# Patient Record
Sex: MALE | Race: ASIAN | NOT HISPANIC OR LATINO
[De-identification: names, ages, dates, MRNs, and addresses within clinical notes are randomized per-mention and may not be internally consistent; named-entity substitution may affect disease eponyms.]

---

## 2017-09-19 ENCOUNTER — APPOINTMENT (OUTPATIENT)
Dept: VASCULAR SURGERY | Facility: CLINIC | Age: 57
End: 2017-09-19
Payer: MEDICAID

## 2017-09-19 VITALS
DIASTOLIC BLOOD PRESSURE: 79 MMHG | SYSTOLIC BLOOD PRESSURE: 129 MMHG | TEMPERATURE: 98.6 F | BODY MASS INDEX: 29.03 KG/M2 | HEART RATE: 70 BPM | HEIGHT: 67 IN | WEIGHT: 185 LBS | RESPIRATION RATE: 16 BRPM

## 2017-09-19 DIAGNOSIS — I65.29 OCCLUSION AND STENOSIS OF UNSPECIFIED CAROTID ARTERY: ICD-10-CM

## 2017-09-19 PROCEDURE — 93880 EXTRACRANIAL BILAT STUDY: CPT

## 2017-09-19 PROCEDURE — 99204 OFFICE O/P NEW MOD 45 MIN: CPT | Mod: 25

## 2018-03-13 ENCOUNTER — APPOINTMENT (OUTPATIENT)
Dept: VASCULAR SURGERY | Facility: CLINIC | Age: 58
End: 2018-03-13

## 2018-09-02 ENCOUNTER — EMERGENCY (EMERGENCY)
Facility: HOSPITAL | Age: 58
LOS: 1 days | Discharge: HOME | End: 2018-09-02
Attending: EMERGENCY MEDICINE | Admitting: EMERGENCY MEDICINE
Payer: MEDICAID

## 2018-09-02 VITALS
OXYGEN SATURATION: 98 % | RESPIRATION RATE: 16 BRPM | SYSTOLIC BLOOD PRESSURE: 143 MMHG | HEART RATE: 77 BPM | TEMPERATURE: 98 F | DIASTOLIC BLOOD PRESSURE: 73 MMHG

## 2018-09-02 VITALS
OXYGEN SATURATION: 98 % | TEMPERATURE: 98 F | DIASTOLIC BLOOD PRESSURE: 62 MMHG | SYSTOLIC BLOOD PRESSURE: 135 MMHG | HEART RATE: 80 BPM | RESPIRATION RATE: 16 BRPM

## 2018-09-02 PROCEDURE — 99282 EMERGENCY DEPT VISIT SF MDM: CPT | Mod: 25

## 2018-09-02 RX ORDER — ACETAMINOPHEN 500 MG
975 TABLET ORAL ONCE
Qty: 0 | Refills: 0 | Status: COMPLETED | OUTPATIENT
Start: 2018-09-02 | End: 2018-09-02

## 2018-09-02 RX ADMIN — Medication 975 MILLIGRAM(S): at 07:21

## 2018-09-02 NOTE — ED PROVIDER NOTE - GASTROINTESTINAL, MLM
Abdomen soft, non-tender, no guarding.
TAVR, transfemoral approach  03/29/2017  Transfemoral TAVR via Right Femoral Cutdown (23mm Martha S3) (NCT# [id number]) (STS/ACC TVT Registry ID# [id number])  Active  [id number]

## 2018-09-02 NOTE — ED PROVIDER NOTE - ATTENDING CONTRIBUTION TO CARE
MD Thomas:  I performed a face to face bedside interview with patient regarding history of present illness, review of symptoms and past medical history. I completed an independent physical exam(documented below).  I have discussed patient's plan of care with resident.   I agree with note as stated above, having amended the EMR as needed to reflect my findings. I have discussed the assessment and plan of care.  This includes during the time I functioned as the attending physician for this patient.  PE:  Gen: Alert, NAD  Head: NC, AT,  EOMI, normal lids/conjunctiva  ENT:  normal hearing, patent oropharynx without erythema/exudate  Neck: +supple, no tenderness/meningismus/JVD, +Trachea midline  Chest: no chest wall tenderness, equal chest rise  Pulm: Bilateral BS, normal resp effort, no wheeze/stridor/retractions  CV: RRR, no M/R/G, +dist pulses  Abd: +BS, soft, NT/ND  Rectal: deferred  Mskel: no edema/erythema/cyanosis; skin tear/avulsion on sole of R foot approx 3cm in diameter; +ttp, not actively bleeding  Skin: no rash  Neuro: AAOx3, no sensory/motor deficits  MDM:  59yo M w/ pmh inclusive of htn, hld, IDDM, etoh abuse c/o R foot injury. States he struck bottom of his R foot against piece of furniture while falling out of bed; denies head trauma or pain, not on AC. Wound to be cleaned and bandaged by resident, outpt wound clinic f/u as necessary.

## 2018-09-02 NOTE — ED ADULT TRIAGE NOTE - CHIEF COMPLAINT QUOTE
Pt walk in c/o pain, skin tears on Right sole s/p Fall from bed. denies Hitting his head on the floor/ LOC

## 2018-09-02 NOTE — ED ADULT NURSE NOTE - OBJECTIVE STATEMENT
Patient is awake, A&O x 3, appears uncomfortable but in no apparent distress.  Patient rolled off bed and hit right foot on something, wound with mild bleeding noted to plantar surface.  Denies dizziness, LOC or trauma to head.  History of CAD, DM and HTN.  Vitals taken, MD at bedside and will continue to monitor patient.

## 2018-09-02 NOTE — ED PROVIDER NOTE - OBJECTIVE STATEMENT
58M h/o alcohol use, IDDM, HLD, HTN presents with skin tear on bottom of right foot after falling out of his bed while sleeping and hitting the edge of a table. He had no LOC, did not hit head. Had minor bleeding and some pain in the area. No other concerns. Last drink before bed.

## 2018-09-02 NOTE — ED PROVIDER NOTE - PLAN OF CARE
1. TAKE ALL MEDICATIONS AS DIRECTED.    2. FOR PAIN OR FEVER YOU CAN TAKE IBUPROFEN (MOTRIN, ADVIL) OR ACETAMINOPHEN (TYLENOL) AS NEEDED, AS DIRECTED ON PACKAGING.  3. FOLLOW UP WITH YOUR PRIMARY DOCTOR WITHIN 5 DAYS AS DIRECTED.  4. IF YOU HAD LABS OR IMAGING DONE, YOU WERE GIVEN COPIES OF ALL LABS AND/OR IMAGING RESULTS FROM YOUR ER VISIT--PLEASE TAKE THEM WITH YOU TO YOUR FOLLOW UP APPOINTMENTS.  5. IF NEEDED, CALL PATIENT ACCESS SERVICES AT [phone] TO FIND A PRIMARY CARE PHYSICIAN.  OR CALL [phone] TO MAKE AN APPOINTMENT WITH THE CLINIC.  6. RETURN TO THE ER FOR ANY WORSENING SYMPTOMS OR CONCERNS.  7. Follow wound care instructions given at discharge.  8. Follow up with your Podiatrist and PMD.

## 2018-09-02 NOTE — ED ADULT NURSE NOTE - PMH
CAD (coronary artery disease)    DM (diabetes mellitus)    HLD (hyperlipidemia)    HTN - Hypertension

## 2018-09-02 NOTE — ED ADULT NURSE NOTE - NSIMPLEMENTINTERV_GEN_ALL_ED
Implemented All Universal Safety Interventions:  [place] to call system. Call bell, personal items and telephone within reach. Instruct patient to call for assistance. Room bathroom lighting operational. Non-slip footwear when patient is off stretcher. Physically safe environment: no spills, clutter or unnecessary equipment. Stretcher in lowest position, wheels locked, appropriate side rails in place.

## 2018-09-02 NOTE — ED PROVIDER NOTE - SKIN WOUND TYPE
skin tear approx. 2x3 cm with thin skin and clotted blood beneath it. No active bleeding, No futher lacerations under the skin. Skin removed and cleaned./avulsion(s)/ABRASION(S)

## 2018-09-29 ENCOUNTER — INPATIENT (INPATIENT)
Facility: HOSPITAL | Age: 58
LOS: 2 days | Discharge: HOME | End: 2018-10-02
Attending: INTERNAL MEDICINE | Admitting: INTERNAL MEDICINE
Payer: MEDICAID

## 2018-09-29 VITALS
OXYGEN SATURATION: 98 % | SYSTOLIC BLOOD PRESSURE: 238 MMHG | DIASTOLIC BLOOD PRESSURE: 116 MMHG | TEMPERATURE: 99 F | HEART RATE: 108 BPM | RESPIRATION RATE: 35 BRPM

## 2018-09-29 DIAGNOSIS — J81.0 ACUTE PULMONARY EDEMA: ICD-10-CM

## 2018-09-29 DIAGNOSIS — B34.1 ENTEROVIRUS INFECTION, UNSPECIFIED: ICD-10-CM

## 2018-09-29 DIAGNOSIS — E11.65 TYPE 2 DIABETES MELLITUS WITH HYPERGLYCEMIA: ICD-10-CM

## 2018-09-29 DIAGNOSIS — I50.23 ACUTE ON CHRONIC SYSTOLIC (CONGESTIVE) HEART FAILURE: ICD-10-CM

## 2018-09-29 DIAGNOSIS — I16.1 HYPERTENSIVE EMERGENCY: ICD-10-CM

## 2018-09-29 DIAGNOSIS — I25.10 ATHEROSCLEROTIC HEART DISEASE OF NATIVE CORONARY ARTERY WITHOUT ANGINA PECTORIS: ICD-10-CM

## 2018-09-29 DIAGNOSIS — E78.5 HYPERLIPIDEMIA, UNSPECIFIED: ICD-10-CM

## 2018-09-29 DIAGNOSIS — Z29.9 ENCOUNTER FOR PROPHYLACTIC MEASURES, UNSPECIFIED: ICD-10-CM

## 2018-09-29 LAB
ALBUMIN SERPL ELPH-MCNC: 4.2 G/DL — SIGNIFICANT CHANGE UP (ref 3.3–5)
ALP SERPL-CCNC: 90 U/L — SIGNIFICANT CHANGE UP (ref 40–120)
ALT FLD-CCNC: 48 U/L — HIGH (ref 4–41)
APTT BLD: 30.4 SEC — SIGNIFICANT CHANGE UP (ref 27.5–37.4)
AST SERPL-CCNC: 32 U/L — SIGNIFICANT CHANGE UP (ref 4–40)
B PERT DNA SPEC QL NAA+PROBE: SIGNIFICANT CHANGE UP
BASE EXCESS BLDV CALC-SCNC: -1 MMOL/L — SIGNIFICANT CHANGE UP
BASOPHILS # BLD AUTO: 0.04 K/UL — SIGNIFICANT CHANGE UP (ref 0–0.2)
BASOPHILS NFR BLD AUTO: 0.4 % — SIGNIFICANT CHANGE UP (ref 0–2)
BILIRUB SERPL-MCNC: 0.4 MG/DL — SIGNIFICANT CHANGE UP (ref 0.2–1.2)
BLOOD GAS VENOUS - CREATININE: 0.89 MG/DL — SIGNIFICANT CHANGE UP (ref 0.5–1.3)
BUN SERPL-MCNC: 19 MG/DL — SIGNIFICANT CHANGE UP (ref 7–23)
C PNEUM DNA SPEC QL NAA+PROBE: NOT DETECTED — SIGNIFICANT CHANGE UP
CALCIUM SERPL-MCNC: 9.3 MG/DL — SIGNIFICANT CHANGE UP (ref 8.4–10.5)
CHLORIDE BLDV-SCNC: 104 MMOL/L — SIGNIFICANT CHANGE UP (ref 96–108)
CHLORIDE SERPL-SCNC: 100 MMOL/L — SIGNIFICANT CHANGE UP (ref 98–107)
CO2 SERPL-SCNC: 22 MMOL/L — SIGNIFICANT CHANGE UP (ref 22–31)
CREAT SERPL-MCNC: 0.94 MG/DL — SIGNIFICANT CHANGE UP (ref 0.5–1.3)
EOSINOPHIL # BLD AUTO: 0.14 K/UL — SIGNIFICANT CHANGE UP (ref 0–0.5)
EOSINOPHIL NFR BLD AUTO: 1.4 % — SIGNIFICANT CHANGE UP (ref 0–6)
FLUAV H1 2009 PAND RNA SPEC QL NAA+PROBE: NOT DETECTED — SIGNIFICANT CHANGE UP
FLUAV H1 RNA SPEC QL NAA+PROBE: NOT DETECTED — SIGNIFICANT CHANGE UP
FLUAV H3 RNA SPEC QL NAA+PROBE: NOT DETECTED — SIGNIFICANT CHANGE UP
FLUAV SUBTYP SPEC NAA+PROBE: SIGNIFICANT CHANGE UP
FLUBV RNA SPEC QL NAA+PROBE: NOT DETECTED — SIGNIFICANT CHANGE UP
GAS PNL BLDV: 137 MMOL/L — SIGNIFICANT CHANGE UP (ref 136–146)
GLUCOSE BLDV-MCNC: 289 — HIGH (ref 70–99)
GLUCOSE SERPL-MCNC: 281 MG/DL — HIGH (ref 70–99)
HADV DNA SPEC QL NAA+PROBE: NOT DETECTED — SIGNIFICANT CHANGE UP
HCO3 BLDV-SCNC: 22 MMOL/L — SIGNIFICANT CHANGE UP (ref 20–27)
HCOV 229E RNA SPEC QL NAA+PROBE: NOT DETECTED — SIGNIFICANT CHANGE UP
HCOV HKU1 RNA SPEC QL NAA+PROBE: NOT DETECTED — SIGNIFICANT CHANGE UP
HCOV NL63 RNA SPEC QL NAA+PROBE: NOT DETECTED — SIGNIFICANT CHANGE UP
HCOV OC43 RNA SPEC QL NAA+PROBE: NOT DETECTED — SIGNIFICANT CHANGE UP
HCT VFR BLD CALC: 42.2 % — SIGNIFICANT CHANGE UP (ref 39–50)
HCT VFR BLDV CALC: 42 % — SIGNIFICANT CHANGE UP (ref 39–51)
HGB BLD-MCNC: 13.3 G/DL — SIGNIFICANT CHANGE UP (ref 13–17)
HGB BLDV-MCNC: 13.7 G/DL — SIGNIFICANT CHANGE UP (ref 13–17)
HMPV RNA SPEC QL NAA+PROBE: NOT DETECTED — SIGNIFICANT CHANGE UP
HPIV1 RNA SPEC QL NAA+PROBE: NOT DETECTED — SIGNIFICANT CHANGE UP
HPIV2 RNA SPEC QL NAA+PROBE: NOT DETECTED — SIGNIFICANT CHANGE UP
HPIV3 RNA SPEC QL NAA+PROBE: NOT DETECTED — SIGNIFICANT CHANGE UP
HPIV4 RNA SPEC QL NAA+PROBE: NOT DETECTED — SIGNIFICANT CHANGE UP
IMM GRANULOCYTES # BLD AUTO: 0.04 # — SIGNIFICANT CHANGE UP
IMM GRANULOCYTES NFR BLD AUTO: 0.4 % — SIGNIFICANT CHANGE UP (ref 0–1.5)
INR BLD: 0.97 — SIGNIFICANT CHANGE UP (ref 0.88–1.17)
LACTATE BLDV-MCNC: 1.9 MMOL/L — SIGNIFICANT CHANGE UP (ref 0.5–2)
LYMPHOCYTES # BLD AUTO: 2.86 K/UL — SIGNIFICANT CHANGE UP (ref 1–3.3)
LYMPHOCYTES # BLD AUTO: 28.8 % — SIGNIFICANT CHANGE UP (ref 13–44)
M PNEUMO DNA SPEC QL NAA+PROBE: NOT DETECTED — SIGNIFICANT CHANGE UP
MCHC RBC-ENTMCNC: 25.7 PG — LOW (ref 27–34)
MCHC RBC-ENTMCNC: 31.5 % — LOW (ref 32–36)
MCV RBC AUTO: 81.6 FL — SIGNIFICANT CHANGE UP (ref 80–100)
MONOCYTES # BLD AUTO: 1.17 K/UL — HIGH (ref 0–0.9)
MONOCYTES NFR BLD AUTO: 11.8 % — SIGNIFICANT CHANGE UP (ref 2–14)
NEUTROPHILS # BLD AUTO: 5.69 K/UL — SIGNIFICANT CHANGE UP (ref 1.8–7.4)
NEUTROPHILS NFR BLD AUTO: 57.2 % — SIGNIFICANT CHANGE UP (ref 43–77)
NRBC # FLD: 0 — SIGNIFICANT CHANGE UP
NT-PROBNP SERPL-SCNC: 897.3 PG/ML — SIGNIFICANT CHANGE UP
PCO2 BLDV: 50 MMHG — SIGNIFICANT CHANGE UP (ref 41–51)
PH BLDV: 7.31 PH — LOW (ref 7.32–7.43)
PLATELET # BLD AUTO: 229 K/UL — SIGNIFICANT CHANGE UP (ref 150–400)
PMV BLD: 12.5 FL — SIGNIFICANT CHANGE UP (ref 7–13)
PO2 BLDV: 39 MMHG — SIGNIFICANT CHANGE UP (ref 35–40)
POTASSIUM BLDV-SCNC: 4.2 MMOL/L — SIGNIFICANT CHANGE UP (ref 3.4–4.5)
POTASSIUM SERPL-MCNC: 4.2 MMOL/L — SIGNIFICANT CHANGE UP (ref 3.5–5.3)
POTASSIUM SERPL-SCNC: 4.2 MMOL/L — SIGNIFICANT CHANGE UP (ref 3.5–5.3)
PROT SERPL-MCNC: 7.3 G/DL — SIGNIFICANT CHANGE UP (ref 6–8.3)
PROTHROM AB SERPL-ACNC: 11.1 SEC — SIGNIFICANT CHANGE UP (ref 9.8–13.1)
RBC # BLD: 5.17 M/UL — SIGNIFICANT CHANGE UP (ref 4.2–5.8)
RBC # FLD: 14.7 % — HIGH (ref 10.3–14.5)
RSV RNA SPEC QL NAA+PROBE: NOT DETECTED — SIGNIFICANT CHANGE UP
RV+EV RNA SPEC QL NAA+PROBE: NOT DETECTED — SIGNIFICANT CHANGE UP
SAO2 % BLDV: 62.3 % — SIGNIFICANT CHANGE UP (ref 60–85)
SODIUM SERPL-SCNC: 136 MMOL/L — SIGNIFICANT CHANGE UP (ref 135–145)
TROPONIN T, HIGH SENSITIVITY: 24 NG/L — SIGNIFICANT CHANGE UP (ref ?–14)
TROPONIN T, HIGH SENSITIVITY: 26 NG/L — SIGNIFICANT CHANGE UP (ref ?–14)
WBC # BLD: 9.94 K/UL — SIGNIFICANT CHANGE UP (ref 3.8–10.5)
WBC # FLD AUTO: 9.94 K/UL — SIGNIFICANT CHANGE UP (ref 3.8–10.5)

## 2018-09-29 PROCEDURE — 71045 X-RAY EXAM CHEST 1 VIEW: CPT | Mod: 26

## 2018-09-29 PROCEDURE — 99223 1ST HOSP IP/OBS HIGH 75: CPT

## 2018-09-29 RX ORDER — DEXTROSE 50 % IN WATER 50 %
25 SYRINGE (ML) INTRAVENOUS ONCE
Qty: 0 | Refills: 0 | Status: DISCONTINUED | OUTPATIENT
Start: 2018-09-29 | End: 2018-10-02

## 2018-09-29 RX ORDER — TIOTROPIUM BROMIDE 18 UG/1
1 CAPSULE ORAL; RESPIRATORY (INHALATION) DAILY
Qty: 0 | Refills: 0 | Status: DISCONTINUED | OUTPATIENT
Start: 2018-09-29 | End: 2018-10-01

## 2018-09-29 RX ORDER — SODIUM CHLORIDE 9 MG/ML
1000 INJECTION, SOLUTION INTRAVENOUS
Qty: 0 | Refills: 0 | Status: DISCONTINUED | OUTPATIENT
Start: 2018-09-29 | End: 2018-10-02

## 2018-09-29 RX ORDER — INSULIN LISPRO 100/ML
VIAL (ML) SUBCUTANEOUS
Qty: 0 | Refills: 0 | Status: DISCONTINUED | OUTPATIENT
Start: 2018-09-29 | End: 2018-09-30

## 2018-09-29 RX ORDER — GLUCAGON INJECTION, SOLUTION 0.5 MG/.1ML
1 INJECTION, SOLUTION SUBCUTANEOUS ONCE
Qty: 0 | Refills: 0 | Status: DISCONTINUED | OUTPATIENT
Start: 2018-09-29 | End: 2018-10-02

## 2018-09-29 RX ORDER — CLOPIDOGREL BISULFATE 75 MG/1
75 TABLET, FILM COATED ORAL DAILY
Qty: 0 | Refills: 0 | Status: DISCONTINUED | OUTPATIENT
Start: 2018-09-29 | End: 2018-10-02

## 2018-09-29 RX ORDER — INSULIN LISPRO 100/ML
10 VIAL (ML) SUBCUTANEOUS
Qty: 0 | Refills: 0 | Status: DISCONTINUED | OUTPATIENT
Start: 2018-09-29 | End: 2018-09-30

## 2018-09-29 RX ORDER — ENOXAPARIN SODIUM 100 MG/ML
40 INJECTION SUBCUTANEOUS EVERY 24 HOURS
Qty: 0 | Refills: 0 | Status: DISCONTINUED | OUTPATIENT
Start: 2018-09-29 | End: 2018-10-02

## 2018-09-29 RX ORDER — FUROSEMIDE 40 MG
40 TABLET ORAL DAILY
Qty: 0 | Refills: 0 | Status: DISCONTINUED | OUTPATIENT
Start: 2018-09-29 | End: 2018-09-30

## 2018-09-29 RX ORDER — DEXTROSE 50 % IN WATER 50 %
15 SYRINGE (ML) INTRAVENOUS ONCE
Qty: 0 | Refills: 0 | Status: DISCONTINUED | OUTPATIENT
Start: 2018-09-29 | End: 2018-10-02

## 2018-09-29 RX ORDER — ATORVASTATIN CALCIUM 80 MG/1
80 TABLET, FILM COATED ORAL AT BEDTIME
Qty: 0 | Refills: 0 | Status: DISCONTINUED | OUTPATIENT
Start: 2018-09-29 | End: 2018-10-02

## 2018-09-29 RX ORDER — INSULIN LISPRO 100/ML
VIAL (ML) SUBCUTANEOUS AT BEDTIME
Qty: 0 | Refills: 0 | Status: DISCONTINUED | OUTPATIENT
Start: 2018-09-29 | End: 2018-09-30

## 2018-09-29 RX ORDER — METOPROLOL TARTRATE 50 MG
25 TABLET ORAL EVERY 12 HOURS
Qty: 0 | Refills: 0 | Status: DISCONTINUED | OUTPATIENT
Start: 2018-09-29 | End: 2018-10-02

## 2018-09-29 RX ORDER — AZITHROMYCIN 500 MG/1
500 TABLET, FILM COATED ORAL ONCE
Qty: 0 | Refills: 0 | Status: COMPLETED | OUTPATIENT
Start: 2018-09-29 | End: 2018-09-29

## 2018-09-29 RX ORDER — INSULIN GLARGINE 100 [IU]/ML
20 INJECTION, SOLUTION SUBCUTANEOUS EVERY MORNING
Qty: 0 | Refills: 0 | Status: DISCONTINUED | OUTPATIENT
Start: 2018-09-29 | End: 2018-09-30

## 2018-09-29 RX ORDER — DEXTROSE 50 % IN WATER 50 %
12.5 SYRINGE (ML) INTRAVENOUS ONCE
Qty: 0 | Refills: 0 | Status: DISCONTINUED | OUTPATIENT
Start: 2018-09-29 | End: 2018-10-02

## 2018-09-29 RX ORDER — NITROGLYCERIN 6.5 MG
0.4 CAPSULE, EXTENDED RELEASE ORAL ONCE
Qty: 0 | Refills: 0 | Status: COMPLETED | OUTPATIENT
Start: 2018-09-29 | End: 2018-09-29

## 2018-09-29 RX ORDER — ALBUTEROL 90 UG/1
1 AEROSOL, METERED ORAL EVERY 4 HOURS
Qty: 0 | Refills: 0 | Status: DISCONTINUED | OUTPATIENT
Start: 2018-09-29 | End: 2018-10-01

## 2018-09-29 RX ORDER — PANTOPRAZOLE SODIUM 20 MG/1
40 TABLET, DELAYED RELEASE ORAL
Qty: 0 | Refills: 0 | Status: DISCONTINUED | OUTPATIENT
Start: 2018-09-29 | End: 2018-10-02

## 2018-09-29 RX ORDER — AZITHROMYCIN 500 MG/1
500 TABLET, FILM COATED ORAL ONCE
Qty: 0 | Refills: 0 | Status: DISCONTINUED | OUTPATIENT
Start: 2018-09-29 | End: 2018-09-29

## 2018-09-29 RX ORDER — ASPIRIN/CALCIUM CARB/MAGNESIUM 324 MG
81 TABLET ORAL DAILY
Qty: 0 | Refills: 0 | Status: DISCONTINUED | OUTPATIENT
Start: 2018-09-29 | End: 2018-10-02

## 2018-09-29 RX ORDER — IPRATROPIUM/ALBUTEROL SULFATE 18-103MCG
3 AEROSOL WITH ADAPTER (GRAM) INHALATION EVERY 6 HOURS
Qty: 0 | Refills: 0 | Status: DISCONTINUED | OUTPATIENT
Start: 2018-09-29 | End: 2018-10-01

## 2018-09-29 RX ORDER — CEFTRIAXONE 500 MG/1
1 INJECTION, POWDER, FOR SOLUTION INTRAMUSCULAR; INTRAVENOUS ONCE
Qty: 0 | Refills: 0 | Status: COMPLETED | OUTPATIENT
Start: 2018-09-29 | End: 2018-09-29

## 2018-09-29 RX ORDER — VANCOMYCIN HCL 1 G
1000 VIAL (EA) INTRAVENOUS ONCE
Qty: 0 | Refills: 0 | Status: COMPLETED | OUTPATIENT
Start: 2018-09-29 | End: 2018-09-29

## 2018-09-29 RX ORDER — ACETAMINOPHEN 500 MG
650 TABLET ORAL EVERY 6 HOURS
Qty: 0 | Refills: 0 | Status: DISCONTINUED | OUTPATIENT
Start: 2018-09-29 | End: 2018-10-02

## 2018-09-29 RX ORDER — FUROSEMIDE 40 MG
20 TABLET ORAL ONCE
Qty: 0 | Refills: 0 | Status: COMPLETED | OUTPATIENT
Start: 2018-09-29 | End: 2018-09-29

## 2018-09-29 RX ADMIN — Medication 0.4 MILLIGRAM(S): at 00:46

## 2018-09-29 RX ADMIN — Medication 2: at 17:50

## 2018-09-29 RX ADMIN — ATORVASTATIN CALCIUM 80 MILLIGRAM(S): 80 TABLET, FILM COATED ORAL at 22:02

## 2018-09-29 RX ADMIN — PANTOPRAZOLE SODIUM 40 MILLIGRAM(S): 20 TABLET, DELAYED RELEASE ORAL at 12:02

## 2018-09-29 RX ADMIN — ENOXAPARIN SODIUM 40 MILLIGRAM(S): 100 INJECTION SUBCUTANEOUS at 12:40

## 2018-09-29 RX ADMIN — AZITHROMYCIN 500 MILLIGRAM(S): 500 TABLET, FILM COATED ORAL at 03:09

## 2018-09-29 RX ADMIN — CLOPIDOGREL BISULFATE 75 MILLIGRAM(S): 75 TABLET, FILM COATED ORAL at 11:49

## 2018-09-29 RX ADMIN — CEFTRIAXONE 1 GRAM(S): 500 INJECTION, POWDER, FOR SOLUTION INTRAMUSCULAR; INTRAVENOUS at 01:32

## 2018-09-29 RX ADMIN — Medication 6: at 22:06

## 2018-09-29 RX ADMIN — CEFTRIAXONE 100 GRAM(S): 500 INJECTION, POWDER, FOR SOLUTION INTRAMUSCULAR; INTRAVENOUS at 01:04

## 2018-09-29 RX ADMIN — Medication 3 MILLILITER(S): at 22:14

## 2018-09-29 RX ADMIN — Medication 8: at 13:10

## 2018-09-29 RX ADMIN — INSULIN GLARGINE 20 UNIT(S): 100 INJECTION, SOLUTION SUBCUTANEOUS at 22:02

## 2018-09-29 RX ADMIN — Medication 40 MILLIGRAM(S): at 11:50

## 2018-09-29 RX ADMIN — Medication 20 MILLIGRAM(S): at 05:35

## 2018-09-29 RX ADMIN — Medication 25 MILLIGRAM(S): at 08:29

## 2018-09-29 RX ADMIN — Medication 81 MILLIGRAM(S): at 11:49

## 2018-09-29 RX ADMIN — Medication 1000 MILLIGRAM(S): at 04:10

## 2018-09-29 RX ADMIN — Medication 250 MILLIGRAM(S): at 03:10

## 2018-09-29 RX ADMIN — Medication 25 MILLIGRAM(S): at 17:50

## 2018-09-29 RX ADMIN — AZITHROMYCIN 250 MILLIGRAM(S): 500 TABLET, FILM COATED ORAL at 01:33

## 2018-09-29 RX ADMIN — Medication 3 MILLILITER(S): at 11:18

## 2018-09-29 RX ADMIN — Medication 10 UNIT(S): at 13:11

## 2018-09-29 NOTE — ED PROVIDER NOTE - PHYSICAL EXAMINATION
Gen: AAOx3, non-toxic  Head: NCAT  HEENT: EOMI, oral mucosa moist, normal conjunctiva  Lung: diffuse rales and rhonchi   CV: tachycardia, regular rhythm, no murmurs, rubs or gallops  Abd: soft, NTND, no guarding, no CVA tenderness  MSK: no visible deformities  Neuro: No focal sensory or motor deficits  Skin: Warm, well perfused, no rash  Psych: normal affect.     ~Lisandro Grewal PGY1

## 2018-09-29 NOTE — H&P ADULT - LYMPHATIC
posterior cervical L/posterior cervical R/supraclavicular R/anterior cervical R/anterior cervical L/supraclavicular L

## 2018-09-29 NOTE — CONSULT NOTE ADULT - ATTENDING COMMENTS
Patient seen and examined.  Agree with above.   -continue with iv lasix    -check tte    Renata Harvey MD

## 2018-09-29 NOTE — ED PROVIDER NOTE - NS ED ROS FT
GENERAL: No fever or chills  EYES: no change in vision  HEENT: no trouble swallowing or speaking  CARDIAC: no chest pain  PULMONARY: +SOB and cough  GI: no abdominal pain, no nausea, no vomiting, no diarrhea or constipation  : No changes in urination  SKIN: no rashes  NEURO: no headache or neurologic sxs  MSK: No joint pain     ~Lisandro Grewal PGY1

## 2018-09-29 NOTE — ED ADULT NURSE NOTE - NSIMPLEMENTINTERV_GEN_ALL_ED
Implemented All Universal Safety Interventions:  [place] to call system. Call bell, personal items and telephone within reach. Instruct patient to call for assistance. Room bathroom lighting operational. Non-slip footwear when patient is off stretcher. Physically safe environment: no spills, clutter or unnecessary equipment. Stretcher in lowest position, wheels locked, appropriate side rails in place.

## 2018-09-29 NOTE — PROGRESS NOTE ADULT - SUBJECTIVE AND OBJECTIVE BOX
CHIEF COMPLAINT: Patient is a 58y old  male who presents with a chief complaint of Respiratory distress (29 Sep 2018 14:11)      SUBJECTIVE / OVERNIGHT EVENTS:    Breathing improved. Coughing slightly. Denies LE edema.    MEDICATIONS  (STANDING):  ALBUTerol    90 MICROgram(s) HFA Inhaler 1 Puff(s) Inhalation every 4 hours  ALBUTerol/ipratropium for Nebulization 3 milliLiter(s) Nebulizer every 6 hours  aspirin enteric coated 81 milliGRAM(s) Oral daily  atorvastatin 80 milliGRAM(s) Oral at bedtime  clopidogrel Tablet 75 milliGRAM(s) Oral daily  dextrose 5%. 1000 milliLiter(s) (50 mL/Hr) IV Continuous <Continuous>  dextrose 50% Injectable 12.5 Gram(s) IV Push once  dextrose 50% Injectable 25 Gram(s) IV Push once  dextrose 50% Injectable 25 Gram(s) IV Push once  enoxaparin Injectable 40 milliGRAM(s) SubCutaneous every 24 hours  furosemide   Injectable 40 milliGRAM(s) IV Push daily  insulin glargine Injectable (LANTUS) 20 Unit(s) SubCutaneous every morning  insulin lispro (HumaLOG) corrective regimen sliding scale   SubCutaneous three times a day before meals  insulin lispro (HumaLOG) corrective regimen sliding scale   SubCutaneous at bedtime  insulin lispro Injectable (HumaLOG) 10 Unit(s) SubCutaneous two times a day before meals  metoprolol tartrate 25 milliGRAM(s) Oral every 12 hours  pantoprazole    Tablet 40 milliGRAM(s) Oral before breakfast  tiotropium 18 MICROgram(s) Capsule 1 Capsule(s) Inhalation daily    MEDICATIONS  (PRN):  acetaminophen   Tablet .. 650 milliGRAM(s) Oral every 6 hours PRN Temp greater or equal to 38C (100.4F), Mild Pain (1 - 3), Moderate Pain (4 - 6)  dextrose 40% Gel 15 Gram(s) Oral once PRN Blood Glucose LESS THAN 70 milliGRAM(s)/deciliter  glucagon  Injectable 1 milliGRAM(s) IntraMuscular once PRN Glucose LESS THAN 70 milligrams/deciliter      VITALS:  T(F): 99.6 (18 @ 14:14), Max: 99.6 (18 @ 14:14)  HR: 78 (18 @ 14:14) (78 - 108)  BP: 118/59 (18 @ 14:14) (118/59 - 238/116)  RR: 15 (18 @ 14:14) (15 - 35)  SpO2: 96% (18 @ 14:14)      CAPILLARY BLOOD GLUCOSE    Output     I&O's Summary  T(F): 99.6 (18 @ 14:14), Max: 99.6 (18 @ 14:14)  HR: 78 (18 @ 14:14) (78 - 108)  BP: 118/59 (18 @ 14:14) (118/59 - 238/116)  RR: 15 (18 @ 14:14) (15 - 35)  SpO2: 96% (18 @ 14:14)    PHYSICAL EXAM:  GENERAL: NAD, well-developed  HEAD:  Atraumatic, Normocephalic  EYES: EOMI  NECK: Supple, No JVD  CHEST/LUNG: bibasilar scattered crackles  HEART: nl S1/S2  ABDOMEN: nondistended, soft  EXTREMITIES:  no LE edema  PSYCH: alert, answering questions appropriately  NEUROLOGY: non-focal  SKIN: No rashes noted    LABS:              13.3                 136  | 22   | 19           9.94  >-----------< 229     ------------------------< 281                   42.2                 4.2  | 100  | 0.94                                         Ca 9.3   Mg x     Ph x           TPro  7.3  /  Alb  4.2      TBili  0.4  /  DBili  x         AST  32  /  ALT  48            AlkPhos  90      INR: 0.97 ;    PT: 11.1 SEC;    PTT: 30.4 SEC            VB-29 @ 00:15  7.31/50/39/22/62.3/-1.0        MICROBIOLOGY:        RADIOLOGY & ADDITIONAL TESTS:    Imaging Personally Reviewed:    < from: Xray Chest 1 View-PORTABLE IMMEDIATE (18 @ 00:39) >  IMPRESSION:   Mild pulmonary edema. Cardiomegaly.    < end of copied text >      [] Consultant(s) Notes Reviewed:    [] Care Discussed with Consultants/Other Providers:

## 2018-09-29 NOTE — ED PROVIDER NOTE - ATTENDING CONTRIBUTION TO CARE
I agree with the above H&P.  Briefly this is a 58 year old male with pmh htn valve replacement presents in resp distress.  per patient he had been having a cough and fever sofr 4 days when today he suddenly became sob. found to have rales throughout all lungs field and elevated bp to 230's.  concern for ape vs post viral pna, less likely copd.  patient given nitro and started on bipap with good effect. will senfd labs, rvp, start abx, and admit

## 2018-09-29 NOTE — PROGRESS NOTE ADULT - ASSESSMENT
59yo Male with hx of HTN, HLD, DM Type 2, and CAD, s/p ROBY in 2012, h/o CABG, sys CHF (4/2012: ventriculography EF 30%, TTE EF=40%) a/w hypertensive emergency c/b acute on chronic systolic HF; Found to have Enterovirus bronchitis with associated bronchoconstriction; improving clinically after BiPAP and aggressive diuresis;

## 2018-09-29 NOTE — ED PROVIDER NOTE - OBJECTIVE STATEMENT
58M hx of HTN, HLD, DM, and CAD BIBEMS with SOB. On arrival, was tachypnic in the high 30s satting low 90s on 2L NS with a BP in the 240s. Improved significantly after being placed on BIPAP and 0.4 of SL nitroglycerin. The patient states that he has experience URI symptoms for the past few days. Today he developed cough and acute SOB, which prompted him to call EMS. Denies any chest pain, headache, N/V/D, fever, chills. Denies any history of CHF or COPD. Non-smoker.

## 2018-09-29 NOTE — H&P ADULT - PROBLEM SELECTOR PLAN 5
Suspect poor compliance wit insulin and DM diet; Unclear stable insulin regimen at home;  Restart  conservative dose of Lantus 20units in am for now  IHSS moderate TID and QHS  A1c  DM diet

## 2018-09-29 NOTE — H&P ADULT - PROBLEM SELECTOR PLAN 2
c/b pulmonary edema; multifactorial etiology due to poor compliance with medications, uncontrolled BP, dietary indiscretions; mildly fluid overloaded; No evidence of acute ischemia; hsTrop elevated, delta=2; ProBNP= 897;   -daily weights  -Telemetry  -Strict I/O  -Cardiology c/s in am   -Unclear why not on ACEI or ARB  -TTE pending

## 2018-09-29 NOTE — H&P ADULT - NSHPSOCIALHISTORY_GEN_ALL_CORE
Lives with spouse and son  Former  (does not work now)  Reports no h/o tobacco, alcohol or illicit drug use

## 2018-09-29 NOTE — ED PROVIDER NOTE - MEDICAL DECISION MAKING DETAILS
58M hx HTN, HLD, DM, CAD presenting with acute hypoxic respiratory failure. Recent URI symptoms. Denies chest pain. Hypertensive in 240s on arrival. Diffuse rales/rhonchi. DDx includes flash pulmonary edema 2/2 HTN, post-viral pneumonia, CHF, COPD, ACS. Will obtain basics, trop, bnp, cultures, cxr, rvp. Tx with BIPAP, abx, nitro, and admit.

## 2018-09-29 NOTE — H&P ADULT - PROBLEM SELECTOR PLAN 3
Made 1.4L of urine after 20mg of Lasix IVP; Clinically rapidly improving; Off BiPAP;  Respiratory distress seems to be resolved;   -VS q4h  -Lasix PRN for now given large amount of urin

## 2018-09-29 NOTE — H&P ADULT - ASSESSMENT
57yo Male with hx of HTN, HLD, DM Type 2, and CAD, s/p ROBY in 2012, h/o CABG, sys CHF (4/2012: ventriculography EF 30%, TTE EF=40%) a/w hypertensive emergency c/b acute on chronic systolic HF; Found to have Enterovirus bronchitis; 57yo Male with hx of HTN, HLD, DM Type 2, and CAD, s/p ROBY in 2012, h/o CABG, sys CHF (4/2012: ventriculography EF 30%, TTE EF=40%) a/w hypertensive emergency c/b acute on chronic systolic HF; Found to have Enterovirus bronchitis with associated bronchoconstriction; improving clinically after BiPAP and aggressive diuresis;

## 2018-09-29 NOTE — ED PROVIDER NOTE - PROGRESS NOTE DETAILS
Spoke to hospitalist who requests admission to tele. Left voicemail for Dr. Krishna (tele doc of the day), awaiting call back. Paged and left a voicemail for Dr. Krishna twice, no call back. Admitting to Dr. Valdez.

## 2018-09-29 NOTE — PATIENT PROFILE ADULT. - VISION (WITH CORRECTIVE LENSES IF THE PATIENT USUALLY WEARS THEM):
Partially impaired: cannot see medication labels or newsprint, but can see obstacles in path, and the surrounding layout; can count fingers at arm's length/wears glasses to read fine prints

## 2018-09-29 NOTE — CONSULT NOTE ADULT - SUBJECTIVE AND OBJECTIVE BOX
HPI:     58 year old man with a history of hypertension, hypercholesterolemia, diabetes, transient palpitations, coronary artery disease, status post MI in November 2005 with four-vessel bypass in 2005 at [facility], s/p PCI to LCX in 2012 at [facility] (at that time LIMA-LAD patent, PREETI- RPDA patent, but SVG-D1 and SVG-OM occluded) with LVEF 40% in 2012 at time of cath, admitted with fever/chills/SOB.  Denies CHest pain.  PT reports intermittent non compliance with meds.  +Enterovirus on RVP, CXR with mild pulm edema and pt reports improvement in SOB following Lasix.    ED course: Lasix 20mg IVP - made 1400ml, Nitroglycerin 0.4mg sl x 1 dose, Azithromycin IV x 1 dose, Ceftriaxone IV x 1 dose, Vancomycin IV x 1 dose; BiPAP for respiratory distress and work pf breathing while in ED (taken off) (29 Sep 2018 06:24)      PAST MEDICAL & SURGICAL HISTORY:  CAD (coronary artery disease)  DM (diabetes mellitus)  HLD (hyperlipidemia)  HTN - Hypertension  S/P CABG (coronary artery bypass graft): 2005      MEDICATIONS  (STANDING):  ALBUTerol    90 MICROgram(s) HFA Inhaler 1 Puff(s) Inhalation every 4 hours  ALBUTerol/ipratropium for Nebulization 3 milliLiter(s) Nebulizer every 6 hours  aspirin enteric coated 81 milliGRAM(s) Oral daily  atorvastatin 80 milliGRAM(s) Oral at bedtime  clopidogrel Tablet 75 milliGRAM(s) Oral daily  dextrose 5%. 1000 milliLiter(s) (50 mL/Hr) IV Continuous <Continuous>  dextrose 50% Injectable 12.5 Gram(s) IV Push once  dextrose 50% Injectable 25 Gram(s) IV Push once  dextrose 50% Injectable 25 Gram(s) IV Push once  enoxaparin Injectable 40 milliGRAM(s) SubCutaneous every 24 hours  furosemide   Injectable 40 milliGRAM(s) IV Push daily  insulin glargine Injectable (LANTUS) 20 Unit(s) SubCutaneous every morning  insulin lispro (HumaLOG) corrective regimen sliding scale   SubCutaneous three times a day before meals  insulin lispro (HumaLOG) corrective regimen sliding scale   SubCutaneous at bedtime  insulin lispro Injectable (HumaLOG) 10 Unit(s) SubCutaneous two times a day before meals  metoprolol tartrate 25 milliGRAM(s) Oral every 12 hours  pantoprazole    Tablet 40 milliGRAM(s) Oral before breakfast  tiotropium 18 MICROgram(s) Capsule 1 Capsule(s) Inhalation daily      Allergies  penicillin (Rash)    FAMILY HISTORY:  No pertinent family history in first degree relatives  Noncontributory for premature coronary disease or sudden cardiac death    SOCIAL HISTORY:    [x ] Non-smoker  [ ] Smoker  [ ] Alcohol      REVIEW OF SYSTEMS:  [ ]chest pain  [x  ]shortness of breath  [  ]palpitations  [  ]syncope  [ ]near syncope [ ]upper extremity weakness   [ ] lower extremity weakness  [  ]diplopia  [  ]altered mental status   [x  ]fevers  [x ]chills [ ]nausea  [ ]vomitting  [  ]dysphagia    [ ]abdominal pain  [ ]melena  [ ]BRBPR    [  ]epistaxis  [  ]rash  [ ]lower extremity edema      [x ] All others negative	  [ ] Unable to obtain    PHYSICAL EXAM:  T(C): 36.9 (09-29-18 @ 08:10), Max: 37.1 (09-29-18 @ 00:36)  HR: 80 (09-29-18 @ 08:10) (78 - 108)  BP: 160/71 (09-29-18 @ 08:10) (153/75 - 238/116)  RR: 18 (09-29-18 @ 08:10) (18 - 35)  SpO2: 97% (09-29-18 @ 08:10) (96% - 99%)  	  HEENT:   Normal oral mucosa, PERRL, EOMI	  Lymphatic: No lymphadenopathy , no edema  Cardiovascular: Normal S1 S2, No JVD, No murmurs , Peripheral pulses palpable 2+ bilaterally  Respiratory: Lungs clear to auscultation, normal effort 	  Gastrointestinal:  Soft, Non-tender, + BS	  Skin: No rashes, No ecchymoses, No cyanosis, warm to touch  Musculoskeletal: Normal range of motion, normal strength  Psychiatry:  Mood & affect appropriate    DIAGNOSTIC DATA:    EKG: NSR, old septal infarct age indet    < from: Xray Chest 1 View-PORTABLE IMMEDIATE (09.29.18 @ 00:39) >    IMPRESSION:   Mild pulmonary edema. Cardiomegaly.    < end of copied text >    Rapid Respiratory Viral Panel (09.29.18 @ 01:09)    Adenovirus (RapRVP): NOT DETECTED    Influenza A (RapRVP): NOT DETECTED (any subtype)    Influenza AH1 2009 (RapRVP): NOT DETECTED    Influenza AH1 (RapRVP): NOT DETECTED    Influenza AH3 (RapRVP): NOT DETECTED    Influenza B (RapRVP): NOT DETECTED    Parainfluenza 1 (RapRVP): NOT DETECTED    Parainfluenza 2 (RapRVP): NOT DETECTED    Parainfluenza 3 (RapRVP): NOT DETECTED    Parainfluenza 4 (RapRVP): NOT DETECTED    Resp Syncytial Virus (RapRVP): NOT DETECTED    Bordetella pertussis (RapRVP): NOT DETECTED    Chlamydia pneumoniae (RapRVP): NOT DETECTED    Mycoplasma pneumoniae (RapRVP): NOT DETECTED    Entero/Rhinovirus (RapRVP): POSITIVE: 09/29/18 0325:    HKU1 Coronavirus (RapRVP): NOT DETECTED    NL63 Coronavirus (RapRVP): NOT DETECTED    229E Coronavirus (RapRVP): NOT DETECTED    OC43 Coronavirus (RapRVP): NOT DETECTED    hMPV (RapRVP): NOT DETECTED    < from: Transthoracic Echocardiogram w/Doppler (04.19.12 @ 07:42) >  CONCLUSIONS: EF 40%  Technically limited and difficult study.  1. Normal mitral valve. Minimal mitral regurgitation.  2. Mildly  calcified trileaflet aortic valve with normal  opening.  3. Endocardial visualization enhanced with intravenous  injection of echo contrast (Definity). Moderate segmental  left ventricular systolic dysfunction. The inferolateral  wall, apical lateral, distal anterior and apex are  hypokinetic. No obvious  left ventricular thrombus. EFabout   40%  4. The right ventricle is not well visualized; grossly  normal right ventricular systolic function.  5. Small pericardial effusion adjacent to right atrium.  *** Compared with echocardiogram  of 4/10/2012, the mitral  regurgitation is minimal on today's study.  ------------------------------------------------------------------------  Confirmed on  4/19/2012 - 17:01:57 by Deisy Earl MD    < end of copied text >      < from: Cardiac Cath Lab (04.11.12 @ 16:22) >    Ventricles: Global left ventricular function was severely depressed. EF  estimated by contrast ventriculography was 30 %.  Coronary vessels: The coronary circulation is right dominant.  LM:      LM: Angiography showed minor luminal irregularities with no flow  limiting lesions.  LAD:      Proximal LAD: There was a 100 % stenosis.  CX:      Proximal circumflex: There was a diffuse 80 % stenosis.  RCA:      Proximal RCA: There was a 100 % stenosis.  GRAFTS:      Graft to the distal LAD: The graft was a LIMA. Graft  angiography showed minor luminal irregularities.  Graft to the 1st diagonal: The graft was a saphenous vein graft from the  aorta. It was occluded.  Graft to the 1st obtuse marginal: The graft was a saphenous vein graft from  the aorta. It was occluded.  Graft to the RPDA: The graft was a PREETI. Graft angiography showed minor  luminal irregularities.  Complications: There were no complications.  Recommendations:  The patient should continue with the present medications.  Impressions: Succesfull ROBY implantation. (LCX)  Prepared and signed by  Saul Acharya M.D.  Signed 04/12/2012 14:16:18    < end of copied text >    ECHO IN MY OFFICE 9/2017:   Conclusions:   1. Normal left ventricular size with mild global systolic function. EF 47%  2. Mild diastolic dysfunction.   3. Normal right ventricular size and function.   4. Moderate left atrial enlargement.     NST IN MY OFFICE 11/2016:  CONCLUSIONS: Abnormal Study.   Abnormal maximal exercise treadmill stress test.   Good exercise performance.   Abnormal myocardial perfusion SPECT images. There is an anterior, lateral and inferoapical MI with   mild norma-infarct ischemia of the lateral wall.   Normal left ventricular size and function. Calculated EF is 44%     	  LABS:	                         13.3   9.94  )-----------( 229      ( 29 Sep 2018 00:35 )             42.2     136  |  100  |  19  ----------------------------<  281<H>  4.2   |  22  |  0.94    Ca    9.3      29 Sep 2018 00:35    TPro  7.3  /  Alb  4.2  /  TBili  0.4  /  DBili  x   /  AST  32  /  ALT  48<H>  /  AlkPhos  90  09-29    proBNP: Serum Pro-Brain Natriuretic Peptide: 897.3 pg/mL (09-29 @ 00:35)  Troponin T, High Sensitivity (09.29.18 @ 03:30)    Troponin T, High Sensitivity: 26  Troponin T, High Sensitivity (09.29.18 @ 00:35)    Troponin T, High Sensitivity: 24      ASSESSMENT/PLAN:  58 year old man with a history of hypertension, hypercholesterolemia, diabetes, transient palpitations, coronary artery disease, status post MI in November 2005 with four-vessel bypass in 2005 at [facility], s/p PCI to LCX in 2012 at [facility] (at that time LIMA-LAD patent, PREETI- RPDA patent, but SVG-D1 and SVG-OM occluded) admitted with fever/chills/SOB.  +Enterovirus on RVP, CXR with mild pulm edema.    --Last NST in my office in 11/2016 with EF 44% and anterior, lateral and inferoapical MI with mild norma-infarct ischemia of the lateral wall.   --Last TTE in my office 9/2017 with LVEF 47%  --Agree with IV Lasix for mild volume overload  --Can repeat TTE once euvolemic  --Trop T indeterminate, recommend check CPK and trend, though Patient without any chest pain or unstable anginal sx  --treatment of symptoms related to viral illness per primary team

## 2018-09-29 NOTE — H&P ADULT - NSHPLABSRESULTS_GEN_ALL_CORE
CXR -  mild pulmonary edema, cardiomegaly - my reading CXR -  mild pulmonary edema, cardiomegaly - my reading    EKG, 9/29, NSR, 97bpm, qtc 434, LAFB, Tw inv in aVL and flattening in I, no acute ST changes - my reading CXR -  mild pulmonary edema, cardiomegaly - my reading    EKG, 9/29, NSR, 97bpm, qtc 434, LAFB, Tw inv in aVL and flattening in I, no acute ST changes - my reading    Cardiac cath 4/11/2012    Ventricles: Global left ventricular function was severely depressed. EF  estimated by contrast ventriculography was 30 %.  Coronary vessels: The coronary circulation is right dominant.  LM:      LM: Angiography showed minor luminal irregularities with no flow  limiting lesions.  LAD:      Proximal LAD: There was a 100 % stenosis.  CX:      Proximal circumflex: There was a diffuse 80 % stenosis.  RCA:      Proximal RCA: There was a 100 % stenosis.  GRAFTS:      Graft to the distal LAD: The graft was a LIMA. Graft  angiography showed minor luminal irregularities.  Graft to the 1st diagonal: The graft was a saphenous vein graft from the  aorta. It was occluded.  Graft to the 1st obtuse marginal: The graft was a saphenous vein graft from  the aorta. It was occluded.  Graft to the RPDA: The graft was a PREETI. Graft angiography showed minor  luminal irregularities.

## 2018-09-29 NOTE — PROGRESS NOTE ADULT - PROBLEM SELECTOR PLAN 2
c/b pulmonary edema; multifactorial etiology due to poor compliance with medications, uncontrolled BP, dietary indiscretions; mildly fluid overloaded; No evidence of acute ischemia; hsTrop elevated, delta=2; ProBNP= 897;   -daily weights  -Telemetry  -Strict I/O  -Cardiology consult appreciated  -continue IV Lasix  -Unclear why not on ACEI or ARB  -TTE pending

## 2018-09-29 NOTE — H&P ADULT - PROBLEM SELECTOR PLAN 1
c/b acute on chronic sys HF and pulm edema;  -Restart Metoprolol succinate ER --> tartrate BID while hospitalized  -Hold off on starting Lisinopril to avoid too rapid BP normalization   -Telemetry, serial CE  -VS q4h

## 2018-09-29 NOTE — H&P ADULT - HISTORY OF PRESENT ILLNESS
59yo Male with hx of HTN, HLD, DM Type 2, and CAD; The pt was BIBEMS c/o SOB. On arrival, was tachypneic in the high 30s was satting in low 90s on 2L NS with a BP in the 240s. Improved significantly after being placed on BIPAP and 0.4 of SL nitroglycerin. The patient states that he has experience URI symptoms for the past few days. Today he developed cough and acute SOB, which prompted him to call EMS. Reports no chest pain, headache, N/V/D, fever, chills. Also reports no history of CHF or COPD. Non-smoker. 59yo Male with hx of HTN, HLD, DM Type 2, and CAD; The pt was BIBEMS c/o SOB. On arrival, was tachypneic in the high 30s was satting in low 90s on 2L NS with a BP in the 240s. Improved significantly after being placed on BIPAP and 0.4 of SL nitroglycerin. The patient states that he has experience URI symptoms for the past few days. Today he developed cough and acute SOB, which prompted him to call EMS. Reports no chest pain, headache, N/V/D, fever, chills. Also reports no history of CHF or COPD. Non-smoker.    ED course: Lasix 20mg IVP - made 1400ml, Nitroglycerin 0.4mg sl x 1 dose, Azithromycin IV x 1 dose, Ceftriaxone IV x 1 dose, Vancomycin IV x 1 dose; BiPAP while in ED (taken off) 57yo Male with hx of HTN, HLD, DM Type 2, and CAD, s/p ROBY in 2012, h/o CABG, sys CHF (4/2012: ventriculography EF 30%, TTE EF=40%); The pt was BIBEMS c/o SOB. The patient states that he has experience URI symptoms for the past few days including subjective fever and chills. Today he developed cough and acute SOB, which prompted him to call EMS. States could not take air in. Reports no chest pain, headache, N/V/D, fever, chills. Also reports no history of CHF or COPD. States that takes Lantus 10 units BID or 25 units BID, cannot recall as the dose was "recently changed", states that also takes Novolog before meals BID but cannot recall the dose. In addition the pt is taken of Januvia due to insurance issues.   Pt reports no medical FHx in siblings and parents, specifically reports no h/o HTN, DM or heart disease;     ED course: Lasix 20mg IVP - made 1400ml, Nitroglycerin 0.4mg sl x 1 dose, Azithromycin IV x 1 dose, Ceftriaxone IV x 1 dose, Vancomycin IV x 1 dose; BiPAP for respiratory distress and work pf breathing while in ED (taken off) 57yo Male with hx of HTN, HLD, DM Type 2, and CAD, s/p ROBY in 2012, h/o CABG, sys CHF (4/2012: ventriculography EF 30%, TTE EF=40%); The pt was BIBEMS c/o SOB. The patient states that he has experience URI symptoms for the past few days including subjective fever and chills. Today he developed cough and acute SOB, which prompted him to call EMS. States could not take air in. Reports no chest pain, headache, N/V/D, fever, chills. Also reports no history of CHF or COPD. States that takes Lantus 10 units BID or 25 units BID, cannot recall as the dose was "recently changed", states that also takes Novolog before meals 10units BID but is not sure if this is the correct dose. In addition the pt is taken of Januvia due to insurance issues. States that occasionally forgets to take his medications.   Pt reports no medical FHx in siblings and parents, specifically reports no h/o HTN, DM or heart disease;     ED course: Lasix 20mg IVP - made 1400ml, Nitroglycerin 0.4mg sl x 1 dose, Azithromycin IV x 1 dose, Ceftriaxone IV x 1 dose, Vancomycin IV x 1 dose; BiPAP for respiratory distress and work pf breathing while in ED (taken off)

## 2018-09-29 NOTE — ED ADULT NURSE NOTE - OBJECTIVE STATEMENT
pt recd as notification from via [facility], c/o sudden episode of Shortness of breath, on NRB saturation at 95% reports having Flu symptoms for 4 days. noted labored breathing, tachypneic, used of accessory muscle to breath, rales even w/o ascultation noted, PMHx of DM, Cardiac Stent. HLD HTN, on cm sinus tachy  rhythm MD at bedside and  aware BP is elevated. will continue to monitor

## 2018-09-29 NOTE — H&P ADULT - PROBLEM SELECTOR PLAN 6
s/p ROBY in 2012, h/o CABG; No CP or palpitations;   EKG: no acute ischemic changes  c/w Plavix and ASA 81

## 2018-09-29 NOTE — PATIENT PROFILE ADULT. - NS TRANSFER PATIENT BELONGINGS
Clothing/2 silver bracelets, 1 pair of black flip flops no brand; 1 pair of black [employer]idas sweat pants with white side stripes; 1 black/purple striped shirt

## 2018-09-30 DIAGNOSIS — E78.4 OTHER HYPERLIPIDEMIA: ICD-10-CM

## 2018-09-30 DIAGNOSIS — E11.65 TYPE 2 DIABETES MELLITUS WITH HYPERGLYCEMIA: ICD-10-CM

## 2018-09-30 DIAGNOSIS — I10 ESSENTIAL (PRIMARY) HYPERTENSION: ICD-10-CM

## 2018-09-30 LAB
BUN SERPL-MCNC: 16 MG/DL — SIGNIFICANT CHANGE UP (ref 7–23)
CALCIUM SERPL-MCNC: 8.9 MG/DL — SIGNIFICANT CHANGE UP (ref 8.4–10.5)
CHLORIDE SERPL-SCNC: 100 MMOL/L — SIGNIFICANT CHANGE UP (ref 98–107)
CHOLEST SERPL-MCNC: 156 MG/DL — SIGNIFICANT CHANGE UP (ref 120–199)
CO2 SERPL-SCNC: 23 MMOL/L — SIGNIFICANT CHANGE UP (ref 22–31)
CREAT SERPL-MCNC: 0.94 MG/DL — SIGNIFICANT CHANGE UP (ref 0.5–1.3)
GLUCOSE SERPL-MCNC: 242 MG/DL — HIGH (ref 70–99)
HBA1C BLD-MCNC: 12.9 % — HIGH (ref 4–5.6)
HCT VFR BLD CALC: 37.6 % — LOW (ref 39–50)
HDLC SERPL-MCNC: 34 MG/DL — LOW (ref 35–55)
HGB BLD-MCNC: 12 G/DL — LOW (ref 13–17)
LIPID PNL WITH DIRECT LDL SERPL: 107 MG/DL — SIGNIFICANT CHANGE UP
MAGNESIUM SERPL-MCNC: 1.7 MG/DL — SIGNIFICANT CHANGE UP (ref 1.6–2.6)
MCHC RBC-ENTMCNC: 25.3 PG — LOW (ref 27–34)
MCHC RBC-ENTMCNC: 31.9 % — LOW (ref 32–36)
MCV RBC AUTO: 79.2 FL — LOW (ref 80–100)
NRBC # FLD: 0 — SIGNIFICANT CHANGE UP
PLATELET # BLD AUTO: 214 K/UL — SIGNIFICANT CHANGE UP (ref 150–400)
PMV BLD: 12 FL — SIGNIFICANT CHANGE UP (ref 7–13)
POTASSIUM SERPL-MCNC: 3.5 MMOL/L — SIGNIFICANT CHANGE UP (ref 3.5–5.3)
POTASSIUM SERPL-SCNC: 3.5 MMOL/L — SIGNIFICANT CHANGE UP (ref 3.5–5.3)
RBC # BLD: 4.75 M/UL — SIGNIFICANT CHANGE UP (ref 4.2–5.8)
RBC # FLD: 14.9 % — HIGH (ref 10.3–14.5)
SODIUM SERPL-SCNC: 137 MMOL/L — SIGNIFICANT CHANGE UP (ref 135–145)
SPECIMEN SOURCE: SIGNIFICANT CHANGE UP
SPECIMEN SOURCE: SIGNIFICANT CHANGE UP
TRIGL SERPL-MCNC: 164 MG/DL — HIGH (ref 10–149)
WBC # BLD: 6.33 K/UL — SIGNIFICANT CHANGE UP (ref 3.8–10.5)
WBC # FLD AUTO: 6.33 K/UL — SIGNIFICANT CHANGE UP (ref 3.8–10.5)

## 2018-09-30 PROCEDURE — 99222 1ST HOSP IP/OBS MODERATE 55: CPT

## 2018-09-30 PROCEDURE — 99233 SBSQ HOSP IP/OBS HIGH 50: CPT

## 2018-09-30 RX ORDER — INSULIN LISPRO 100/ML
15 VIAL (ML) SUBCUTANEOUS
Qty: 0 | Refills: 0 | Status: DISCONTINUED | OUTPATIENT
Start: 2018-09-30 | End: 2018-10-01

## 2018-09-30 RX ORDER — INSULIN LISPRO 100/ML
VIAL (ML) SUBCUTANEOUS AT BEDTIME
Qty: 0 | Refills: 0 | Status: DISCONTINUED | OUTPATIENT
Start: 2018-09-30 | End: 2018-10-02

## 2018-09-30 RX ORDER — INSULIN GLARGINE 100 [IU]/ML
24 INJECTION, SOLUTION SUBCUTANEOUS
Qty: 0 | Refills: 0 | Status: DISCONTINUED | OUTPATIENT
Start: 2018-09-30 | End: 2018-10-01

## 2018-09-30 RX ORDER — INSULIN GLARGINE 100 [IU]/ML
24 INJECTION, SOLUTION SUBCUTANEOUS AT BEDTIME
Qty: 0 | Refills: 0 | Status: DISCONTINUED | OUTPATIENT
Start: 2018-09-30 | End: 2018-09-30

## 2018-09-30 RX ORDER — INSULIN LISPRO 100/ML
VIAL (ML) SUBCUTANEOUS
Qty: 0 | Refills: 0 | Status: DISCONTINUED | OUTPATIENT
Start: 2018-09-30 | End: 2018-10-02

## 2018-09-30 RX ORDER — INSULIN LISPRO 100/ML
13 VIAL (ML) SUBCUTANEOUS
Qty: 0 | Refills: 0 | Status: DISCONTINUED | OUTPATIENT
Start: 2018-09-30 | End: 2018-09-30

## 2018-09-30 RX ORDER — INSULIN LISPRO 100/ML
15 VIAL (ML) SUBCUTANEOUS
Qty: 0 | Refills: 0 | Status: DISCONTINUED | OUTPATIENT
Start: 2018-09-30 | End: 2018-09-30

## 2018-09-30 RX ORDER — INSULIN GLARGINE 100 [IU]/ML
25 INJECTION, SOLUTION SUBCUTANEOUS AT BEDTIME
Qty: 0 | Refills: 0 | Status: DISCONTINUED | OUTPATIENT
Start: 2018-09-30 | End: 2018-09-30

## 2018-09-30 RX ADMIN — ATORVASTATIN CALCIUM 80 MILLIGRAM(S): 80 TABLET, FILM COATED ORAL at 21:46

## 2018-09-30 RX ADMIN — Medication 8: at 09:17

## 2018-09-30 RX ADMIN — Medication 3 MILLILITER(S): at 03:48

## 2018-09-30 RX ADMIN — Medication 25 MILLIGRAM(S): at 06:10

## 2018-09-30 RX ADMIN — Medication 15 UNIT(S): at 17:42

## 2018-09-30 RX ADMIN — ENOXAPARIN SODIUM 40 MILLIGRAM(S): 100 INJECTION SUBCUTANEOUS at 11:13

## 2018-09-30 RX ADMIN — Medication 10 UNIT(S): at 09:16

## 2018-09-30 RX ADMIN — Medication 81 MILLIGRAM(S): at 11:12

## 2018-09-30 RX ADMIN — Medication 6: at 21:45

## 2018-09-30 RX ADMIN — Medication 40 MILLIGRAM(S): at 06:10

## 2018-09-30 RX ADMIN — INSULIN GLARGINE 20 UNIT(S): 100 INJECTION, SOLUTION SUBCUTANEOUS at 11:12

## 2018-09-30 RX ADMIN — INSULIN GLARGINE 24 UNIT(S): 100 INJECTION, SOLUTION SUBCUTANEOUS at 21:46

## 2018-09-30 RX ADMIN — Medication 25 MILLIGRAM(S): at 17:42

## 2018-09-30 RX ADMIN — PANTOPRAZOLE SODIUM 40 MILLIGRAM(S): 20 TABLET, DELAYED RELEASE ORAL at 06:10

## 2018-09-30 RX ADMIN — CLOPIDOGREL BISULFATE 75 MILLIGRAM(S): 75 TABLET, FILM COATED ORAL at 11:12

## 2018-09-30 RX ADMIN — Medication 8: at 17:42

## 2018-09-30 RX ADMIN — Medication 10: at 13:47

## 2018-09-30 NOTE — PROGRESS NOTE ADULT - SUBJECTIVE AND OBJECTIVE BOX
CHIEF COMPLAINT: Patient is a 58y old  male who presents with a chief complaint of Respiratory distress; (30 Sep 2018 14:20)      SUBJECTIVE / OVERNIGHT EVENTS:    Breathing improved. Denies cough. No other complaints.    MEDICATIONS  (STANDING):  ALBUTerol    90 MICROgram(s) HFA Inhaler 1 Puff(s) Inhalation every 4 hours  ALBUTerol/ipratropium for Nebulization 3 milliLiter(s) Nebulizer every 6 hours  aspirin enteric coated 81 milliGRAM(s) Oral daily  atorvastatin 80 milliGRAM(s) Oral at bedtime  clopidogrel Tablet 75 milliGRAM(s) Oral daily  dextrose 5%. 1000 milliLiter(s) (50 mL/Hr) IV Continuous <Continuous>  dextrose 50% Injectable 12.5 Gram(s) IV Push once  dextrose 50% Injectable 25 Gram(s) IV Push once  dextrose 50% Injectable 25 Gram(s) IV Push once  enoxaparin Injectable 40 milliGRAM(s) SubCutaneous every 24 hours  furosemide   Injectable 40 milliGRAM(s) IV Push daily  insulin glargine Injectable (LANTUS) 24 Unit(s) SubCutaneous two times a day  insulin lispro (HumaLOG) corrective regimen sliding scale   SubCutaneous three times a day before meals  insulin lispro (HumaLOG) corrective regimen sliding scale   SubCutaneous at bedtime  insulin lispro Injectable (HumaLOG) 15 Unit(s) SubCutaneous three times a day before meals  metoprolol tartrate 25 milliGRAM(s) Oral every 12 hours  pantoprazole    Tablet 40 milliGRAM(s) Oral before breakfast  tiotropium 18 MICROgram(s) Capsule 1 Capsule(s) Inhalation daily    MEDICATIONS  (PRN):  acetaminophen   Tablet .. 650 milliGRAM(s) Oral every 6 hours PRN Temp greater or equal to 38C (100.4F), Mild Pain (1 - 3), Moderate Pain (4 - 6)  dextrose 40% Gel 15 Gram(s) Oral once PRN Blood Glucose LESS THAN 70 milliGRAM(s)/deciliter  glucagon  Injectable 1 milliGRAM(s) IntraMuscular once PRN Glucose LESS THAN 70 milligrams/deciliter      VITALS:  T(F): 97.2 (18 @ 09:30), Max: 99 (18 @ 22:21)  HR: 73 (18 @ 09:30) (68 - 82)  BP: 106/61 (18 @ 09:30) (106/61 - 143/65)  RR: 17 (18 @ 09:30) (16 - 18)  SpO2: 97% (18 @ 09:30)  Height (cm): 167.64 (22:21)  Weight (kg): 90.6 (22:21)  BMI (kg/m2): 32.2 (22:21)    CAPILLARY BLOOD GLUCOSE    Output   Height (cm): 167.64 (22:21)  Weight (kg): 90.6 (22:21)  BMI (kg/m2): 32.2 (22:21)  I&O's Summary  T(F): 97.2 (18 @ 09:30), Max: 99 (18 @ 22:21)  HR: 73 (18 @ 09:30) (68 - 82)  BP: 106/61 (18 @ 09:30) (106/61 - 143/65)  RR: 17 (18 @ 09:30) (16 - 18)  SpO2: 97% (18 @ 09:30)    PHYSICAL EXAM:  GENERAL: NAD, well-developed  HEAD:  Atraumatic, Normocephalic  EYES: EOMI  NECK: Supple, No JVD  CHEST/LUNG: nonlabored breathing, CTAB  HEART: nl S1/S2  ABDOMEN: nondistended, soft  EXTREMITIES:  no LE edema  PSYCH: alert, answering questions appropriately  NEUROLOGY: non-focal  SKIN: No rashes noted    LABS:              12.0                 137  | 23   | 16           6.33  >-----------< 214     ------------------------< 242                   37.6                 3.5  | 100  | 0.94                                         Ca 8.9   Mg 1.7   Ph x           TPro  7.3  /  Alb  4.2      TBili  0.4  /  DBili  x         AST  32  /  ALT  48            AlkPhos  90      INR: 0.97 ;    PT: 11.1 SEC;    PTT: 30.4 SEC            VB-29 @ 00:15  7.31/50/39/22/62.3/-1.0        MICROBIOLOGY:    Culture - Blood (collected 29 Sep 2018 02:46)  Source: BLOOD VENOUS  Preliminary Report (30 Sep 2018 02:46):    NO ORGANISMS ISOLATED    NO ORGANISMS ISOLATED AT 24 HOURS    Culture - Blood (collected 29 Sep 2018 02:46)  Source: BLOOD PERIPHERAL  Preliminary Report (30 Sep 2018 02:46):    NO ORGANISMS ISOLATED    NO ORGANISMS ISOLATED AT 24 HOURS          RADIOLOGY & ADDITIONAL TESTS:    Imaging Personally Reviewed:    < from: Xray Chest 1 View-PORTABLE IMMEDIATE (18 @ 00:39) >  IMPRESSION:   Mild pulmonary edema. Cardiomegaly.      < end of copied text >      [x] Consultant(s) Notes Reviewed: endocrine    [] Care Discussed with Consultants/Other Providers:

## 2018-09-30 NOTE — CONSULT NOTE ADULT - PROBLEM SELECTOR RECOMMENDATION 3
- Recommend continue Atorvastatin 80 mg QHS    Our service will follow    Sabi Cooper DO  Pager 9AM-5PM: [phone]  Pager Nights and Weekends: [phone]

## 2018-09-30 NOTE — PROGRESS NOTE ADULT - SUBJECTIVE AND OBJECTIVE BOX
Subjective: pt seen and examined, no complaints, ROS - .     acetaminophen   Tablet .. 650 milliGRAM(s) Oral every 6 hours PRN  ALBUTerol    90 MICROgram(s) HFA Inhaler 1 Puff(s) Inhalation every 4 hours  ALBUTerol/ipratropium for Nebulization 3 milliLiter(s) Nebulizer every 6 hours  aspirin enteric coated 81 milliGRAM(s) Oral daily  atorvastatin 80 milliGRAM(s) Oral at bedtime  clopidogrel Tablet 75 milliGRAM(s) Oral daily  dextrose 40% Gel 15 Gram(s) Oral once PRN  dextrose 5%. 1000 milliLiter(s) IV Continuous <Continuous>  dextrose 50% Injectable 12.5 Gram(s) IV Push once  dextrose 50% Injectable 25 Gram(s) IV Push once  dextrose 50% Injectable 25 Gram(s) IV Push once  enoxaparin Injectable 40 milliGRAM(s) SubCutaneous every 24 hours  furosemide   Injectable 40 milliGRAM(s) IV Push daily  glucagon  Injectable 1 milliGRAM(s) IntraMuscular once PRN  insulin glargine Injectable (LANTUS) 20 Unit(s) SubCutaneous every morning  insulin lispro (HumaLOG) corrective regimen sliding scale   SubCutaneous three times a day before meals  insulin lispro (HumaLOG) corrective regimen sliding scale   SubCutaneous at bedtime  insulin lispro Injectable (HumaLOG) 10 Unit(s) SubCutaneous two times a day before meals  metoprolol tartrate 25 milliGRAM(s) Oral every 12 hours  pantoprazole    Tablet 40 milliGRAM(s) Oral before breakfast  tiotropium 18 MICROgram(s) Capsule 1 Capsule(s) Inhalation daily                            12.0   6.33  )-----------( 214      ( 30 Sep 2018 05:30 )             37.6       Hemoglobin: 12.0 g/dL (09-30 @ 05:30)  Hemoglobin: 13.3 g/dL (09-29 @ 00:35)      09-30    137  |  100  |  16  ----------------------------<  242<H>  3.5   |  23  |  0.94    Ca    8.9      30 Sep 2018 05:30  Mg     1.7     09-30    TPro  7.3  /  Alb  4.2  /  TBili  0.4  /  DBili  x   /  AST  32  /  ALT  48<H>  /  AlkPhos  90  09-29    Creatinine Trend: 0.94<--, 0.94<--    COAGS:           T(C): 36.9 (09-30-18 @ 06:08), Max: 37.6 (09-29-18 @ 14:14)  HR: 72 (09-30-18 @ 06:08) (68 - 82)  BP: 126/82 (09-30-18 @ 06:08) (118/59 - 160/71)  RR: 17 (09-30-18 @ 06:08) (15 - 18)  SpO2: 97% (09-30-18 @ 06:08) (96% - 99%)  Wt(kg): --    I&O's Summary    29 Sep 2018 07:01  -  30 Sep 2018 07:00  --------------------------------------------------------  IN: 140 mL / OUT: 850 mL / NET: -710 mL        Appearance: Normal	  HEENT:   Normal oral mucosa, PERRL, EOMI	  Lymphatic: No lymphadenopathy , no edema  Cardiovascular: Normal S1 S2, No JVD, No murmurs , Peripheral pulses palpable 2+ bilaterally  Respiratory: Lungs clear to auscultation, normal effort 	  Gastrointestinal:  Soft, Non-tender, + BS	  Skin: No rashes, No ecchymoses, No cyanosis, warm to touch  Musculoskeletal: Normal range of motion, normal strength  Psychiatry:  Mood & affect appropriate        EKG: NSR, old septal infarct age indet    < from: Xray Chest 1 View-PORTABLE IMMEDIATE (09.29.18 @ 00:39) >    IMPRESSION:   Mild pulmonary edema. Cardiomegaly.    < end of copied text >    < from: Transthoracic Echocardiogram w/Doppler (04.19.12 @ 07:42) >  CONCLUSIONS: EF 40%  Technically limited and difficult study.  1. Normal mitral valve. Minimal mitral regurgitation.  2. Mildly  calcified trileaflet aortic valve with normal  opening.  3. Endocardial visualization enhanced with intravenous  injection of echo contrast (Definity). Moderate segmental  left ventricular systolic dysfunction. The inferolateral  wall, apical lateral, distal anterior and apex are  hypokinetic. No obvious  left ventricular thrombus. EFabout   40%  4. The right ventricle is not well visualized; grossly  normal right ventricular systolic function.  5. Small pericardial effusion adjacent to right atrium.  *** Compared with echocardiogram  of 4/10/2012, the mitral  regurgitation is minimal on today's study.  ------------------------------------------------------------------------  Confirmed on  4/19/2012 - 17:01:57 by Deisy Earl MD    < end of copied text >      < from: Cardiac Cath Lab (04.11.12 @ 16:22) >    Ventricles: Global left ventricular function was severely depressed. EF  estimated by contrast ventriculography was 30 %.  Coronary vessels: The coronary circulation is right dominant.  LM:      LM: Angiography showed minor luminal irregularities with no flow  limiting lesions.  LAD:      Proximal LAD: There was a 100 % stenosis.  CX:      Proximal circumflex: There was a diffuse 80 % stenosis.  RCA:      Proximal RCA: There was a 100 % stenosis.  GRAFTS:      Graft to the distal LAD: The graft was a LIMA. Graft  angiography showed minor luminal irregularities.  Graft to the 1st diagonal: The graft was a saphenous vein graft from the  aorta. It was occluded.  Graft to the 1st obtuse marginal: The graft was a saphenous vein graft from  the aorta. It was occluded.  Graft to the RPDA: The graft was a PREETI. Graft angiography showed minor  luminal irregularities.  Complications: There were no complications.  Recommendations:  The patient should continue with the present medications.  Impressions: Succesfull ROBY implantation. (LCX)  Prepared and signed by  Saul Acharya M.D.  Signed 04/12/2012 14:16:18    < end of copied text >    ECHO IN MY OFFICE 9/2017:   Conclusions:   1. Normal left ventricular size with mild global systolic function. EF 47%  2. Mild diastolic dysfunction.   3. Normal right ventricular size and function.   4. Moderate left atrial enlargement.     NST IN MY OFFICE 11/2016:  CONCLUSIONS: Abnormal Study.   Abnormal maximal exercise treadmill stress test.   Good exercise performance.   Abnormal myocardial perfusion SPECT images. There is an anterior, lateral and inferoapical MI with   mild norma-infarct ischemia of the lateral wall.   Normal left ventricular size and function. Calculated EF is 44%     	  ASSESSMENT/PLAN: 	58y Male with a history of hypertension, hypercholesterolemia, diabetes, transient palpitations, coronary artery disease, status post MI in November 2005 with four-vessel bypass in 2005 at [facility], s/p PCI to LCX in 2012 at [facility] (at that time LIMA-LAD patent, PREETI- RPDA patent, but SVG-D1 and SVG-OM occluded) admitted with fever/chills/SOB.  +Enterovirus on RVP, CXR with mild pulm edema.    COnt IV lasix at present   cardiac markers neg  at present   DAPT , statin   cont BB. HR stable   tele stable   ECHO pending   D/W Dr Harvey

## 2018-09-30 NOTE — CONSULT NOTE ADULT - SUBJECTIVE AND OBJECTIVE BOX
Reason For Consult: T2DM Management    HPI: 57yo Male with hx of HTN, HLD, DM Type 2, and CAD, s/p ROBY in 2012, h/o CABG, sys CHF (4/2012: ventriculography EF 30%, TTE EF=40%); The pt was BIBEMS c/o SOB. The patient states that he has experience URI symptoms for the past few days including subjective fever and chills. Today he developed cough and acute SOB, which prompted him to call EMS. States could not take air in. Reports no chest pain, headache, N/V/D, fever, chills. Also reports no history of CHF or COPD. States that takes Lantus 10 units BID or 25 units BID, cannot recall as the dose was "recently changed", states that also takes Novolog before meals 10units BID but is not sure if this is the correct dose. In addition the pt is taken of Januvia due to insurance issues. States that occasionally forgets to take his medications.   Pt reports no medical FHx in siblings and parents, specifically reports no h/o HTN, DM or heart disease;     ED course: Lasix 20mg IVP - made 1400ml, Nitroglycerin 0.4mg sl x 1 dose, Azithromycin IV x 1 dose, Ceftriaxone IV x 1 dose, Vancomycin IV x 1 dose; BiPAP for respiratory distress and work pf breathing while in ED (taken off) (29 Sep 2018 06:24)    Endocrine History:        PAST MEDICAL & SURGICAL HISTORY:  CAD (coronary artery disease)  DM (diabetes mellitus)  HLD (hyperlipidemia)  HTN - Hypertension  S/P CABG (coronary artery bypass graft): 2005      FAMILY HISTORY:  No pertinent family history in first degree relatives      Social History:    Outpatient Medications:    MEDICATIONS  (STANDING):  ALBUTerol    90 MICROgram(s) HFA Inhaler 1 Puff(s) Inhalation every 4 hours  ALBUTerol/ipratropium for Nebulization 3 milliLiter(s) Nebulizer every 6 hours  aspirin enteric coated 81 milliGRAM(s) Oral daily  atorvastatin 80 milliGRAM(s) Oral at bedtime  clopidogrel Tablet 75 milliGRAM(s) Oral daily  dextrose 5%. 1000 milliLiter(s) (50 mL/Hr) IV Continuous <Continuous>  dextrose 50% Injectable 12.5 Gram(s) IV Push once  dextrose 50% Injectable 25 Gram(s) IV Push once  dextrose 50% Injectable 25 Gram(s) IV Push once  enoxaparin Injectable 40 milliGRAM(s) SubCutaneous every 24 hours  furosemide   Injectable 40 milliGRAM(s) IV Push daily  insulin glargine Injectable (LANTUS) 25 Unit(s) SubCutaneous at bedtime  insulin lispro (HumaLOG) corrective regimen sliding scale   SubCutaneous three times a day before meals  insulin lispro (HumaLOG) corrective regimen sliding scale   SubCutaneous at bedtime  insulin lispro Injectable (HumaLOG) 13 Unit(s) SubCutaneous three times a day before meals  metoprolol tartrate 25 milliGRAM(s) Oral every 12 hours  pantoprazole    Tablet 40 milliGRAM(s) Oral before breakfast  tiotropium 18 MICROgram(s) Capsule 1 Capsule(s) Inhalation daily    MEDICATIONS  (PRN):  acetaminophen   Tablet .. 650 milliGRAM(s) Oral every 6 hours PRN Temp greater or equal to 38C (100.4F), Mild Pain (1 - 3), Moderate Pain (4 - 6)  dextrose 40% Gel 15 Gram(s) Oral once PRN Blood Glucose LESS THAN 70 milliGRAM(s)/deciliter  glucagon  Injectable 1 milliGRAM(s) IntraMuscular once PRN Glucose LESS THAN 70 milligrams/deciliter      Allergies    penicillin (Rash)    Intolerances      Review of Systems:  Constitutional: No fever  Eyes: No blurry vision  Neuro: No tremors  HEENT: No pain  Cardiovascular: No chest pain, palpitations  Respiratory: No SOB, no cough  GI: No nausea, vomiting, abdominal pain  : No dysuria  Skin: no rash  Psych: no depression  Endocrine: no polyuria, polydipsia  Hem/lymph: no swelling  Osteoporosis: no fractures    ALL OTHER SYSTEMS REVIEWED AND NEGATIVE    PHYSICAL EXAM:  VITALS: T(C): 36.2 (09-30-18 @ 09:30)  T(F): 97.2 (09-30-18 @ 09:30), Max: 99 (09-29-18 @ 22:21)  HR: 73 (09-30-18 @ 09:30) (68 - 82)  BP: 106/61 (09-30-18 @ 09:30) (106/61 - 143/65)  RR:  (16 - 18)  SpO2:  (96% - 99%)    GENERAL: NAD, well-groomed, well-developed  EYES: No proptosis, no lid lag, anicteric  HEENT:  Atraumatic, Normocephalic, moist mucous membranes  THYROID: Normal size, no palpable nodules  RESPIRATORY: Clear to auscultation bilaterally; No rales, rhonchi, wheezing, or rubs  CARDIOVASCULAR: Regular rate and rhythm; No murmurs; no peripheral edema  GI: Soft, nontender, non distended, normal bowel sounds  SKIN: Dry, intact, No rashes or lesions  MUSCULOSKELETAL: Full range of motion, normal strength  NEURO: sensation intact, extraocular movements intact, no tremor, normal reflexes  PSYCH: Alert and oriented x 3, normal affect, normal mood  CUSHING'S SIGNS: no striae    POCT Blood Glucose.: 353 mg/dL (09-30-18 @ 13:07) Hum 13  POCT Blood Glucose.: 312 mg/dL (09-30-18 @ 09:08) Lantus 20, Hum10+8    POCT Blood Glucose.: 354 mg/dL (09-29-18 @ 21:58) Lantus 20, Hum 6  POCT Blood Glucose.: 161 mg/dL (09-29-18 @ 17:37) Hum 2  POCT Blood Glucose.: 324 mg/dL (09-29-18 @ 12:30) Hum10+8                            12.0   6.33  )-----------( 214      ( 30 Sep 2018 05:30 )             37.6       09-30    137  |  100  |  16  ----------------------------<  242<H>  3.5   |  23  |  0.94    EGFR if : 104  EGFR if non : 90    Ca    8.9      09-30  Mg     1.7     09-30    TPro  7.3  /  Alb  4.2  /  TBili  0.4  /  DBili  x   /  AST  32  /  ALT  48<H>  /  AlkPhos  90  09-29      Hemoglobin A1C, Whole Blood: 12.9 % <H> [4.0 - 5.6] (09-30-18 @ 05:30)      09-30 Chol 156  HDL 34<L> Trig 164<H> Reason For Consult: T2DM Management    HPI: 59yo Male with hx of HTN, HLD, DM Type 2, and CAD, s/p ROBY in 2012, h/o CABG, sys CHF (4/2012: ventriculography EF 30%, TTE EF=40%); The pt was BIBEMS c/o SOB. The patient states that he has experience URI symptoms for the past few days including subjective fever and chills. Today he developed cough and acute SOB, which prompted him to call EMS. States could not take air in. Reports no chest pain, headache, N/V/D, fever, chills. Also reports no history of CHF or COPD. States that takes Lantus 10 units BID or 25 units BID, cannot recall as the dose was "recently changed", states that also takes Novolog before meals 10units BID but is not sure if this is the correct dose. In addition the pt is taken of Januvia due to insurance issues. States that occasionally forgets to take his medications.   Pt reports no medical FHx in siblings and parents, specifically reports no h/o HTN, DM or heart disease;     ED course: Lasix 20mg IVP - made 1400ml, Nitroglycerin 0.4mg sl x 1 dose, Azithromycin IV x 1 dose, Ceftriaxone IV x 1 dose, Vancomycin IV x 1 dose; BiPAP for respiratory distress and work pf breathing while in ED (taken off) (29 Sep 2018 06:24)    Endocrine History:  Patient managed by PMD, does not have endocrinologist. T2DM diagnosed approx 15 years ago complicated with b/l retinopathy s/p laser surgery, CAD s/p CABG in 2005. No neuropathy, no nephropathy, no h/o CVA. For T2DM, in the last 1 year admits to taking Lantus 25 units BID and Novolog 10 units prebreakfast and predinner only. Does not take anything before lunch. Does not use 70/30 Novolog. Admits to using pens and rotating sites. Also admits to taking Januvia and Metformin 850 mg BID. Checks FS only once in AM and most days it is 100-120 but some days 3x/week FS in 70s. States that he will normally take Lantus BID but few days/week he may take only 1 injection in the day. He admits to eating 3 meals and eats roti, vegetables, bread, tea. No rice and no meat. Occ will drink 1/2 cup juice in the morning with normal FS but otherwise no sweets. No alcohol or tobacco. Admits to HTN and HLD and states he takes Metoprolol and a statin, cannot recall name.    EF was 47% in 2017 and currently awaiting another echocardiogram. He admits to being on IV lasix here but states normally does not take Lasix at home.    PAST MEDICAL & SURGICAL HISTORY:  CAD (coronary artery disease)  DM (diabetes mellitus)  HLD (hyperlipidemia)  HTN - Hypertension  S/P CABG (coronary artery bypass graft): 2005      FAMILY HISTORY:  No pertinent family history in first degree relatives      Social History: No tobacco, alcohol    Outpatient Medications: Januvia, metformin 850 mg BID, Lantus 25 units BID and Novolog 10 units BID only.    MEDICATIONS  (STANDING):  ALBUTerol    90 MICROgram(s) HFA Inhaler 1 Puff(s) Inhalation every 4 hours  ALBUTerol/ipratropium for Nebulization 3 milliLiter(s) Nebulizer every 6 hours  aspirin enteric coated 81 milliGRAM(s) Oral daily  atorvastatin 80 milliGRAM(s) Oral at bedtime  clopidogrel Tablet 75 milliGRAM(s) Oral daily  dextrose 5%. 1000 milliLiter(s) (50 mL/Hr) IV Continuous <Continuous>  dextrose 50% Injectable 12.5 Gram(s) IV Push once  dextrose 50% Injectable 25 Gram(s) IV Push once  dextrose 50% Injectable 25 Gram(s) IV Push once  enoxaparin Injectable 40 milliGRAM(s) SubCutaneous every 24 hours  furosemide   Injectable 40 milliGRAM(s) IV Push daily  insulin glargine Injectable (LANTUS) 25 Unit(s) SubCutaneous at bedtime  insulin lispro (HumaLOG) corrective regimen sliding scale   SubCutaneous three times a day before meals  insulin lispro (HumaLOG) corrective regimen sliding scale   SubCutaneous at bedtime  insulin lispro Injectable (HumaLOG) 13 Unit(s) SubCutaneous three times a day before meals  metoprolol tartrate 25 milliGRAM(s) Oral every 12 hours  pantoprazole    Tablet 40 milliGRAM(s) Oral before breakfast  tiotropium 18 MICROgram(s) Capsule 1 Capsule(s) Inhalation daily    MEDICATIONS  (PRN):  acetaminophen   Tablet .. 650 milliGRAM(s) Oral every 6 hours PRN Temp greater or equal to 38C (100.4F), Mild Pain (1 - 3), Moderate Pain (4 - 6)  dextrose 40% Gel 15 Gram(s) Oral once PRN Blood Glucose LESS THAN 70 milliGRAM(s)/deciliter  glucagon  Injectable 1 milliGRAM(s) IntraMuscular once PRN Glucose LESS THAN 70 milligrams/deciliter      Allergies  penicillin (Rash)      Review of Systems:  Constitutional: No fever  Eyes: No blurry vision  Neuro: No tremors  HEENT: No pain  Cardiovascular: + chest pain, palpitations  Respiratory: + SOB, no cough. Symptoms have improved  GI: No nausea, vomiting, abdominal pain  : No dysuria  Skin: no rash  Psych: no depression  Endocrine: no polyuria, polydipsia  Hem/lymph: no swelling  Osteoporosis: no fractures    ALL OTHER SYSTEMS REVIEWED AND NEGATIVE    PHYSICAL EXAM:  VITALS: T(C): 36.2 (09-30-18 @ 09:30)  T(F): 97.2 (09-30-18 @ 09:30), Max: 99 (09-29-18 @ 22:21)  HR: 73 (09-30-18 @ 09:30) (68 - 82)  BP: 106/61 (09-30-18 @ 09:30) (106/61 - 143/65)  RR:  (16 - 18)  SpO2:  (96% - 99%)    GENERAL: NAD, well-groomed, well-developed, obese male  EYES: No proptosis, no lid lag, anicteric  HEENT:  Atraumatic, Normocephalic, moist mucous membranes  THYROID: Normal size, no palpable nodules  RESPIRATORY: Clear to auscultation bilaterally; No rales, rhonchi, wheezing, or rubs  CARDIOVASCULAR: Regular rate and rhythm; No murmurs; no peripheral edema  GI: Soft, nontender, non distended, normal bowel sounds  SKIN: Dry, intact, No rashes or lesions, + mild AN  MUSCULOSKELETAL: Full range of motion, normal strength  NEURO: sensation intact, extraocular movements intact, no tremor  PSYCH: Alert and oriented x 3, normal affect, normal mood  CUSHING'S SIGNS: no striae    POCT Blood Glucose.: 353 mg/dL (09-30-18 @ 13:07) Hum 13  POCT Blood Glucose.: 312 mg/dL (09-30-18 @ 09:08) Lantus 20, Hum10+8    POCT Blood Glucose.: 354 mg/dL (09-29-18 @ 21:58) Lantus 20, Hum 6  POCT Blood Glucose.: 161 mg/dL (09-29-18 @ 17:37) Hum 2  POCT Blood Glucose.: 324 mg/dL (09-29-18 @ 12:30) Hum10+8                            12.0   6.33  )-----------( 214      ( 30 Sep 2018 05:30 )             37.6       09-30    137  |  100  |  16  ----------------------------<  242<H>  3.5   |  23  |  0.94    EGFR if : 104  EGFR if non : 90    Ca    8.9      09-30  Mg     1.7     09-30    TPro  7.3  /  Alb  4.2  /  TBili  0.4  /  DBili  x   /  AST  32  /  ALT  48<H>  /  AlkPhos  90  09-29      Hemoglobin A1C, Whole Blood: 12.9 % <H> [4.0 - 5.6] (09-30-18 @ 05:30)      09-30 Chol 156  HDL 34<L> Trig 164<H>

## 2018-09-30 NOTE — CONSULT NOTE ADULT - PROBLEM SELECTOR RECOMMENDATION 2
- BP is well controlled <130/80  - Recommend continue Metoprolol 25 mg Q12 and IV Lasix as indicated

## 2018-09-30 NOTE — PROGRESS NOTE ADULT - PROBLEM SELECTOR PLAN 2
c/b pulmonary edema; multifactorial etiology due to poor compliance with medications, uncontrolled BP, dietary indiscretions; mildly fluid overloaded; No evidence of acute ischemia; hsTrop elevated, delta=2; ProBNP= 897;   -daily weights  -Telemetry  -Strict I/O  -Cardiology consult appreciated  -volume status improved, lungs CTAB today  -change Lasix to 40 mg PO daily  -Unclear why not on ACEI or ARB

## 2018-09-30 NOTE — PROGRESS NOTE ADULT - ASSESSMENT
59yo Male with hx of HTN, HLD, DM Type 2, and CAD, s/p ROBY in 2012, h/o CABG, sys CHF (4/2012: ventriculography EF 30%, TTE EF=40%) a/w hypertensive emergency c/b acute on chronic systolic HF; Found to have Enterovirus bronchitis with associated bronchoconstriction; improving clinically after BiPAP and aggressive diuresis

## 2018-09-30 NOTE — PROGRESS NOTE ADULT - PROBLEM SELECTOR PLAN 3
Made 1.4L of urine after 20mg of Lasix IVP; Clinically rapidly improving; Off BiPAP;  Respiratory distress seems to be resolved;   -VS q4h  -volume status improved, lungs CTAB today  -change Lasix to 40 mg PO daily

## 2018-09-30 NOTE — CONSULT NOTE ADULT - ASSESSMENT
59 y/o male with uncontrolled T2DM complicated by retinopathy s/p laser surgery in both eyes, CAD s/p CABG, CHF (EF 47%) here with CHF exacerbation. Secondary diagnosis also includes HTN and HLD.

## 2018-09-30 NOTE — CONSULT NOTE ADULT - PROBLEM SELECTOR RECOMMENDATION 9
- Goal FS in 100-180 while inpatient and HbA1c is 7%  - FS continues to be in 300s after receiving Lantus 20 units BID (checked with RN)  - Recommend increase Lantus to 24 units BID  - Recommend increase Humalog to 15 units TID AC with mod SSI AC and HS  - Recommend RD consultation  - Importance of consistent carb diet and routine exercise d/w patient    Outpatient Plan  - Basal/bolus, final doses to be determined  - Metformin continuation depends on EF after repeat Echo  - Recommend discontinue Januvia on discharge  - F/u [address] [facility], call [phone] for appointment with diabetes educator in 2-3 weeks and with endocrinologist in 2-3 months

## 2018-09-30 NOTE — PROGRESS NOTE ADULT - PROBLEM SELECTOR PLAN 5
Hyperglycemic, fingersticks > 300  A1c 12.9  Endocrine consulted, appreciate recs  Monitor fingersticks for improvement  Will need close outpatient endocrine f/u for poorly controlled diabetes

## 2018-10-01 ENCOUNTER — TRANSCRIPTION ENCOUNTER (OUTPATIENT)
Age: 58
End: 2018-10-01

## 2018-10-01 ENCOUNTER — OUTPATIENT (OUTPATIENT)
Dept: OUTPATIENT SERVICES | Facility: HOSPITAL | Age: 58
LOS: 1 days | End: 2018-10-01
Payer: MEDICAID

## 2018-10-01 LAB
BUN SERPL-MCNC: 16 MG/DL — SIGNIFICANT CHANGE UP (ref 7–23)
CALCIUM SERPL-MCNC: 9 MG/DL — SIGNIFICANT CHANGE UP (ref 8.4–10.5)
CHLORIDE SERPL-SCNC: 99 MMOL/L — SIGNIFICANT CHANGE UP (ref 98–107)
CO2 SERPL-SCNC: 23 MMOL/L — SIGNIFICANT CHANGE UP (ref 22–31)
CREAT SERPL-MCNC: 0.88 MG/DL — SIGNIFICANT CHANGE UP (ref 0.5–1.3)
GLUCOSE SERPL-MCNC: 177 MG/DL — HIGH (ref 70–99)
HCT VFR BLD CALC: 40.2 % — SIGNIFICANT CHANGE UP (ref 39–50)
HGB BLD-MCNC: 13 G/DL — SIGNIFICANT CHANGE UP (ref 13–17)
MAGNESIUM SERPL-MCNC: 1.8 MG/DL — SIGNIFICANT CHANGE UP (ref 1.6–2.6)
MCHC RBC-ENTMCNC: 25.9 PG — LOW (ref 27–34)
MCHC RBC-ENTMCNC: 32.3 % — SIGNIFICANT CHANGE UP (ref 32–36)
MCV RBC AUTO: 80.1 FL — SIGNIFICANT CHANGE UP (ref 80–100)
NRBC # FLD: 0 — SIGNIFICANT CHANGE UP
PHOSPHATE SERPL-MCNC: 3.6 MG/DL — SIGNIFICANT CHANGE UP (ref 2.5–4.5)
PLATELET # BLD AUTO: 230 K/UL — SIGNIFICANT CHANGE UP (ref 150–400)
PMV BLD: 11.9 FL — SIGNIFICANT CHANGE UP (ref 7–13)
POTASSIUM SERPL-MCNC: 3.9 MMOL/L — SIGNIFICANT CHANGE UP (ref 3.5–5.3)
POTASSIUM SERPL-SCNC: 3.9 MMOL/L — SIGNIFICANT CHANGE UP (ref 3.5–5.3)
RBC # BLD: 5.02 M/UL — SIGNIFICANT CHANGE UP (ref 4.2–5.8)
RBC # FLD: 14.9 % — HIGH (ref 10.3–14.5)
SODIUM SERPL-SCNC: 137 MMOL/L — SIGNIFICANT CHANGE UP (ref 135–145)
WBC # BLD: 6.57 K/UL — SIGNIFICANT CHANGE UP (ref 3.8–10.5)
WBC # FLD AUTO: 6.57 K/UL — SIGNIFICANT CHANGE UP (ref 3.8–10.5)

## 2018-10-01 PROCEDURE — 99232 SBSQ HOSP IP/OBS MODERATE 35: CPT | Mod: GC

## 2018-10-01 PROCEDURE — G9001: CPT

## 2018-10-01 PROCEDURE — 99233 SBSQ HOSP IP/OBS HIGH 50: CPT

## 2018-10-01 PROCEDURE — 93306 TTE W/DOPPLER COMPLETE: CPT | Mod: 26

## 2018-10-01 RX ORDER — METOPROLOL TARTRATE 50 MG
1 TABLET ORAL
Qty: 60 | Refills: 0 | OUTPATIENT
Start: 2018-10-01 | End: 2018-10-30

## 2018-10-01 RX ORDER — FUROSEMIDE 40 MG
40 TABLET ORAL DAILY
Qty: 0 | Refills: 0 | Status: DISCONTINUED | OUTPATIENT
Start: 2018-10-01 | End: 2018-10-02

## 2018-10-01 RX ORDER — METOPROLOL TARTRATE 50 MG
1 TABLET ORAL
Qty: 0 | Refills: 0 | COMMUNITY

## 2018-10-01 RX ORDER — FUROSEMIDE 40 MG
1 TABLET ORAL
Qty: 30 | Refills: 0 | OUTPATIENT
Start: 2018-10-01 | End: 2018-10-30

## 2018-10-01 RX ORDER — INSULIN GLARGINE 100 [IU]/ML
25 INJECTION, SOLUTION SUBCUTANEOUS
Qty: 0 | Refills: 0 | Status: DISCONTINUED | OUTPATIENT
Start: 2018-10-01 | End: 2018-10-02

## 2018-10-01 RX ORDER — INSULIN LISPRO 100/ML
17 VIAL (ML) SUBCUTANEOUS
Qty: 0 | Refills: 0 | Status: DISCONTINUED | OUTPATIENT
Start: 2018-10-01 | End: 2018-10-02

## 2018-10-01 RX ADMIN — ENOXAPARIN SODIUM 40 MILLIGRAM(S): 100 INJECTION SUBCUTANEOUS at 13:02

## 2018-10-01 RX ADMIN — Medication 2: at 09:18

## 2018-10-01 RX ADMIN — Medication 15 UNIT(S): at 09:18

## 2018-10-01 RX ADMIN — INSULIN GLARGINE 25 UNIT(S): 100 INJECTION, SOLUTION SUBCUTANEOUS at 21:44

## 2018-10-01 RX ADMIN — Medication 25 MILLIGRAM(S): at 05:06

## 2018-10-01 RX ADMIN — Medication 17 UNIT(S): at 17:29

## 2018-10-01 RX ADMIN — CLOPIDOGREL BISULFATE 75 MILLIGRAM(S): 75 TABLET, FILM COATED ORAL at 13:02

## 2018-10-01 RX ADMIN — ATORVASTATIN CALCIUM 80 MILLIGRAM(S): 80 TABLET, FILM COATED ORAL at 21:43

## 2018-10-01 RX ADMIN — PANTOPRAZOLE SODIUM 40 MILLIGRAM(S): 20 TABLET, DELAYED RELEASE ORAL at 05:06

## 2018-10-01 RX ADMIN — Medication 25 MILLIGRAM(S): at 17:27

## 2018-10-01 RX ADMIN — INSULIN GLARGINE 24 UNIT(S): 100 INJECTION, SOLUTION SUBCUTANEOUS at 09:18

## 2018-10-01 RX ADMIN — Medication 4: at 13:01

## 2018-10-01 RX ADMIN — Medication 2: at 17:29

## 2018-10-01 RX ADMIN — Medication 6: at 21:43

## 2018-10-01 RX ADMIN — Medication 40 MILLIGRAM(S): at 13:01

## 2018-10-01 RX ADMIN — Medication 15 UNIT(S): at 13:01

## 2018-10-01 RX ADMIN — Medication 81 MILLIGRAM(S): at 13:02

## 2018-10-01 NOTE — PROGRESS NOTE ADULT - SUBJECTIVE AND OBJECTIVE BOX
Patient is a 58y old  Male who presents with a chief complaint of Respiratory distress; (01 Oct 2018 13:32)      SUBJECTIVE / OVERNIGHT EVENTS: pt in NAD no fever/CP/SOB. NSR with PACs    MEDICATIONS  (STANDING):  aspirin enteric coated 81 milliGRAM(s) Oral daily  atorvastatin 80 milliGRAM(s) Oral at bedtime  clopidogrel Tablet 75 milliGRAM(s) Oral daily  dextrose 5%. 1000 milliLiter(s) (50 mL/Hr) IV Continuous <Continuous>  dextrose 50% Injectable 12.5 Gram(s) IV Push once  dextrose 50% Injectable 25 Gram(s) IV Push once  dextrose 50% Injectable 25 Gram(s) IV Push once  enoxaparin Injectable 40 milliGRAM(s) SubCutaneous every 24 hours  furosemide    Tablet 40 milliGRAM(s) Oral daily  insulin glargine Injectable (LANTUS) 24 Unit(s) SubCutaneous two times a day  insulin lispro (HumaLOG) corrective regimen sliding scale   SubCutaneous three times a day before meals  insulin lispro (HumaLOG) corrective regimen sliding scale   SubCutaneous at bedtime  insulin lispro Injectable (HumaLOG) 15 Unit(s) SubCutaneous three times a day before meals  metoprolol tartrate 25 milliGRAM(s) Oral every 12 hours  pantoprazole    Tablet 40 milliGRAM(s) Oral before breakfast    MEDICATIONS  (PRN):  acetaminophen   Tablet .. 650 milliGRAM(s) Oral every 6 hours PRN Temp greater or equal to 38C (100.4F), Mild Pain (1 - 3), Moderate Pain (4 - 6)  dextrose 40% Gel 15 Gram(s) Oral once PRN Blood Glucose LESS THAN 70 milliGRAM(s)/deciliter  glucagon  Injectable 1 milliGRAM(s) IntraMuscular once PRN Glucose LESS THAN 70 milligrams/deciliter        CAPILLARY BLOOD GLUCOSE      POCT Blood Glucose.: 246 mg/dL (01 Oct 2018 12:52)  POCT Blood Glucose.: 185 mg/dL (01 Oct 2018 08:39)  POCT Blood Glucose.: 353 mg/dL (30 Sep 2018 21:32)  POCT Blood Glucose.: 312 mg/dL (30 Sep 2018 17:04)    I&O's Summary    30 Sep 2018 07:01  -  01 Oct 2018 07:00  --------------------------------------------------------  IN: 400 mL / OUT: 2275 mL / NET: -1875 mL        T(C): 37.3 (10-01-18 @ 12:59), Max: 37.3 (10-01-18 @ 12:59)  HR: 80 (10-01-18 @ 12:59) (67 - 80)  BP: 140/82 (10-01-18 @ 12:59) (133/61 - 154/87)  RR: 18 (10-01-18 @ 12:59) (17 - 18)  SpO2: 99% (10-01-18 @ 12:59) (98% - 99%)    PHYSICAL EXAM:  GENERAL: NAD, well-developed  HEAD:  Atraumatic, Normocephalic  EYES: EOMI, PERRLA, conjunctiva and sclera clear  NECK: Supple, No JVD  CHEST/LUNG: Clear to auscultation bilaterally; No wheeze  HEART: Regular rate and rhythm; No murmurs, rubs, or gallops  ABDOMEN: Soft, Nontender, Nondistended; Bowel sounds present  EXTREMITIES:  2+ Peripheral Pulses, No clubbing, cyanosis, or edema  PSYCH: AAOx3  NEUROLOGY: non-focal  SKIN: No rashes or lesions    LABS:                        13.0   6.57  )-----------( 230      ( 01 Oct 2018 05:15 )             40.2     10-01    137  |  99  |  16  ----------------------------<  177<H>  3.9   |  23  |  0.88    Ca    9.0      01 Oct 2018 05:15  Phos  3.6     10-01  Mg     1.8     10-01                  RADIOLOGY & ADDITIONAL TESTS:    Imaging Personally Reviewed:    Consultant(s) Notes Reviewed:      Care Discussed with Consultants/Other Providers:

## 2018-10-01 NOTE — DISCHARGE NOTE ADULT - MEDICATION SUMMARY - MEDICATIONS TO TAKE
I will START or STAY ON the medications listed below when I get home from the hospital:    BD ultra fine needles for insulin pens  -- for 5 x a day dosing x 30 day supply  -- Indication: For DM (diabetes mellitus)    acetaminophen 325 mg oral tablet  -- 2 tab(s) by mouth every 6 hours, As needed, Temp greater or equal to 38C (100.4F), Mild Pain (1 - 3), Moderate Pain (4 - 6)  -- Indication: For pain    Aspirin Enteric Coated  -- 81 milligram(s) by mouth once a day  -- Indication: For CAD    lisinopril 2.5 mg oral tablet  -- 1 tab(s) by mouth once a day  -- Indication: For CHF    metFORMIN 1000 mg oral tablet  -- 1 tab(s) by mouth 2 times a day   -- Indication: For DM (diabetes mellitus)    Lantus Solostar Pen 100 units/mL subcutaneous solution  -- 30 unit(s) subcutaneous 2 times a day x 30 day supply  -- Do not drink alcoholic beverages when taking this medication.  It is very important that you take or use this exactly as directed.  Do not skip doses or discontinue unless directed by your doctor.  Keep in refrigerator.  Do not freeze.    -- Indication: For DM (diabetes mellitus)    HumaLOG KwikPen 100 units/mL injectable solution  -- 20 unit(s) injectable 3 times a day (before meals)  x 30 day supply  -- Indication: For DM (diabetes mellitus)    atorvastatin  -- 80 milligram(s) by mouth once a day (at bedtime)  -- Indication: For Hyperlipidemia    Plavix 75 mg oral tablet  -- 1 tab(s) by mouth once a day  -- Indication: For CAD    metoprolol tartrate 25 mg oral tablet  -- 1 tab(s) by mouth every 12 hours  -- Indication: For HTN    furosemide 40 mg oral tablet  -- 1 tab(s) by mouth once a day  -- Indication: For CHF    pantoprazole 40 mg oral delayed release tablet  -- 1 tab(s) by mouth once a day (before a meal)  -- Indication: For GERD    Vitamin B12 1000 mcg oral tablet  -- 1 tab(s) by mouth once a day  -- Indication: For supplement

## 2018-10-01 NOTE — DISCHARGE NOTE ADULT - HOSPITAL COURSE
59yo Male with hx of HTN, HLD, DM Type 2, and CAD, s/p ROBY in 2012, h/o CABG, sys CHF (4/2012: ventriculography EF 30%, TTE EF=40%) who was admitted with hypertensive emergency and acute on chronic systolic HF; Found to have Enterovirus bronchitis with associated bronchoconstriction; improving clinically after BiPAP and aggressive diuresis. Restarted patient on Metoprolol tartrate while inpatient with better BP control.  Lisinopril was held in order to avoid too rapid BP normalization. No active telemetry events noted. Volume status improved after IV diuretics, transitioned to oral. Weaned off of BIPAP with no respiratory distress. Fair urine output. No evidence of acute ischemia. Also noted to have uncontrolled fingersticks and A1C of 12.9. Endocrine consulted. Recommendations made, discharge medications to be determined after echocardiogram. Will need close outpatient endocrine f/u for poorly controlled diabetes. To remain on DAPT for history of CABG.     ECHO -     Patient seen and evaluated, no acute somatic complaints. Medically cleared/stable for discharge as per  -- with close outpatient follow up within 1-2 weeks of discharge. Patient understands and agrees with plan of care. 57yo Male with hx of HTN, HLD, DM Type 2, and CAD, s/p ROBY in 2012, h/o CABG, sys CHF (4/2012: ventriculography EF 30%, TTE EF=40%) who was admitted with hypertensive emergency and acute on chronic systolic HF; Found to have Enterovirus bronchitis with associated bronchoconstriction; improving clinically after BiPAP and aggressive diuresis. Restarted patient on Metoprolol tartrate while inpatient with better BP control.  Lisinopril was held in order to avoid too rapid BP normalization. No active telemetry events noted. Volume status improved after IV diuretics, transitioned to oral. Weaned off of BIPAP with no respiratory distress. Fair urine output. No evidence of acute ischemia. Also noted to have uncontrolled fingersticks and A1C of 12.9. Endocrine consulted. Recommendations made, discharge medications to be determined after echocardiogram. Will need close outpatient endocrine f/u for poorly controlled diabetes. To remain on DAPT for history of CABG.     ECHO - Technically difficult study.  1. Mitral annular calcification, otherwise normal mitral  valve. Mild mitral regurgitation.  2. Endocardium not well visualized; grossly moderate global  left ventricular systolic dysfunction.  Endocardial  visualization enhanced with intravenous injection of echo  contrast (Definity).  3. Unable to accurately evaluate right ventricular size or  systolic function.      Patient seen and evaluated, no acute somatic complaints. Medically cleared/stable for discharge as per  -- with close outpatient follow up within 1-2 weeks of discharge. Patient understands and agrees with plan of care.     Discussed with MD - pt stable for discharge home, pt with no complaints, discharge medications reviewed with MD.

## 2018-10-01 NOTE — DISCHARGE NOTE ADULT - PLAN OF CARE
To relieve and prevent worsening symptoms associated with congestive heart failure, to improve quality of life, and to treat underlying conditions such as coronary heart disease, high blood pressure, or diabetes, and to maintain euvolemia. Low salt diet, fluid restriction to 1500 ml daily, monitor your fluid intake and weight daily, exercise as tolerated 30 minutes daily, and follow up with your physician within 7 days. To be asymptomatic, to reduce risks factors such as hypertension, diabetes and hyperlipidemia to lower the risk of blood clots formation; and to prevent complications of coronary artery disease such as worsening chest pain, heart attack and death. Continue aspirin and Plavix, do not stop unless instructed by your physician.  Continue low salt, fat, cholesterol and carbohydrate diet. Follow up with cardiologist and primary care physician's routine appointment. To maintain a normal blood glucose level and HgA1C level < 5.7 and to prevent diabetic complications such as uncontrolled diabetes, diabetic coma, blindness, renal failure, and amputations. Monitor finger sticks pre-meal and bedtime, low salt, fat and carbohydrate diet, minimize glucose intake.  Exercise daily for at least 30 minutes and weight loss.  Follow up with primary care physician and endocrinologist for routine Hemoglobin A1C checks and management.  Follow up with your ophthalmologist for routine yearly vision exams. Your diabetic medication regimen was adjusted while you were an inpatient. Take as directed. To maintain a normal blood pressure to prevent heart attack, stroke and renal failure. Low sodium and fat diet, continue anti-hypertensive medications, and follow up with primary care physician. To maintain normal cholesterol levels to prevent stroke, coronary artery disease, peripheral vascular disease and heart attacks. Low fat diet, exercise daily and continue current medications. Follow up with primary care physician and cardiologist for management.

## 2018-10-01 NOTE — DISCHARGE NOTE ADULT - PATIENT PORTAL LINK FT
You can access the [facility] Patient Portal, offered by [facility], by registering with the following website: [email or web address]

## 2018-10-01 NOTE — DISCHARGE NOTE ADULT - CARE PROVIDER_API CALL
Tico Burks), Cardiac Electrophysiology; Cardiovascular Disease; Nuclear Cardiology  [address]  [address]  [place] [address]  Phone: [phone]  Fax: [phone]

## 2018-10-01 NOTE — DISCHARGE NOTE ADULT - VISION (WITH CORRECTIVE LENSES IF THE PATIENT USUALLY WEARS THEM):
wears glasses to read fine prints/Partially impaired: cannot see medication labels or newsprint, but can see obstacles in path, and the surrounding layout; can count fingers at arm's length

## 2018-10-01 NOTE — PROGRESS NOTE ADULT - PROBLEM SELECTOR PLAN 5
Hyperglycemic, fingersticks > 300  A1c 12.9  Endocrine consulted, appreciate recs  Monitor fingersticks for improvement  Will need close outpatient endocrine f/u for poorly controlled diabetes  -FS as per endo

## 2018-10-01 NOTE — DISCHARGE NOTE ADULT - SECONDARY DIAGNOSIS.
Coronary artery disease involving native coronary artery of native heart without angina pectoris DM (diabetes mellitus) Essential hypertension Hyperlipidemia, unspecified hyperlipidemia type

## 2018-10-01 NOTE — DISCHARGE NOTE ADULT - MEDICATION SUMMARY - MEDICATIONS TO CHANGE
I will SWITCH the dose or number of times a day I take the medications listed below when I get home from the hospital:    Lantus 100 units/mL subcutaneous solution  -- 25 unit(s) subcutaneous once a day    metFORMIN 850 mg oral tablet  -- 1 tab(s) by mouth 2 times a day    Metoprolol Succinate ER 50 mg oral tablet, extended release  -- 1 tab(s) by mouth once a day    pantoprazole 20 mg oral delayed release tablet  -- 1 tab(s) by mouth once a day    NovoLOG 100 units/mL injectable solution  -- 10 unit(s) injectable 2 times a day (with meals)

## 2018-10-01 NOTE — PROGRESS NOTE ADULT - SUBJECTIVE AND OBJECTIVE BOX
S: no chest pain or sob     acetaminophen   Tablet .. 650 milliGRAM(s) Oral every 6 hours PRN  aspirin enteric coated 81 milliGRAM(s) Oral daily  atorvastatin 80 milliGRAM(s) Oral at bedtime  clopidogrel Tablet 75 milliGRAM(s) Oral daily  dextrose 40% Gel 15 Gram(s) Oral once PRN  dextrose 5%. 1000 milliLiter(s) IV Continuous <Continuous>  dextrose 50% Injectable 12.5 Gram(s) IV Push once  dextrose 50% Injectable 25 Gram(s) IV Push once  dextrose 50% Injectable 25 Gram(s) IV Push once  enoxaparin Injectable 40 milliGRAM(s) SubCutaneous every 24 hours  furosemide    Tablet 40 milliGRAM(s) Oral daily  glucagon  Injectable 1 milliGRAM(s) IntraMuscular once PRN  insulin glargine Injectable (LANTUS) 24 Unit(s) SubCutaneous two times a day  insulin lispro (HumaLOG) corrective regimen sliding scale   SubCutaneous three times a day before meals  insulin lispro (HumaLOG) corrective regimen sliding scale   SubCutaneous at bedtime  insulin lispro Injectable (HumaLOG) 15 Unit(s) SubCutaneous three times a day before meals  metoprolol tartrate 25 milliGRAM(s) Oral every 12 hours  pantoprazole    Tablet 40 milliGRAM(s) Oral before breakfast                            13.0   6.57  )-----------( 230      ( 01 Oct 2018 05:15 )             40.2       10-01    137  |  99  |  16  ----------------------------<  177<H>  3.9   |  23  |  0.88    Ca    9.0      01 Oct 2018 05:15  Phos  3.6     10-01  Mg     1.8     10-01              T(C): 37.3 (10-01-18 @ 12:59), Max: 37.3 (10-01-18 @ 12:59)  HR: 80 (10-01-18 @ 12:59) (67 - 80)  BP: 140/82 (10-01-18 @ 12:59) (133/61 - 154/87)  RR: 18 (10-01-18 @ 12:59) (17 - 18)  SpO2: 99% (10-01-18 @ 12:59) (98% - 99%)  Wt(kg): --    I&O's Summary    30 Sep 2018 07:01  -  01 Oct 2018 07:00  --------------------------------------------------------  IN: 400 mL / OUT: 2275 mL / NET: -1875 mL    Heart: normal S1, S2, RRR, no m/r/g  Lungs: Cta b/l  Abd: soft nT  Ext: no edema         EKG: NSR, old septal infarct age indet    < from: Xray Chest 1 View-PORTABLE IMMEDIATE (09.29.18 @ 00:39) >    IMPRESSION:   Mild pulmonary edema. Cardiomegaly.    < end of copied text >    < from: Transthoracic Echocardiogram w/Doppler (04.19.12 @ 07:42) >  CONCLUSIONS: EF 40%  Technically limited and difficult study.  1. Normal mitral valve. Minimal mitral regurgitation.  2. Mildly  calcified trileaflet aortic valve with normal  opening.  3. Endocardial visualization enhanced with intravenous  injection of echo contrast (Definity). Moderate segmental  left ventricular systolic dysfunction. The inferolateral  wall, apical lateral, distal anterior and apex are  hypokinetic. No obvious  left ventricular thrombus. EFabout   40%  4. The right ventricle is not well visualized; grossly  normal right ventricular systolic function.  5. Small pericardial effusion adjacent to right atrium.  *** Compared with echocardiogram  of 4/10/2012, the mitral  regurgitation is minimal on today's study.  ------------------------------------------------------------------------  Confirmed on  4/19/2012 - 17:01:57 by Deisy Earl MD    < end of copied text >      < from: Cardiac Cath Lab (04.11.12 @ 16:22) >    Ventricles: Global left ventricular function was severely depressed. EF  estimated by contrast ventriculography was 30 %.  Coronary vessels: The coronary circulation is right dominant.  LM:      LM: Angiography showed minor luminal irregularities with no flow  limiting lesions.  LAD:      Proximal LAD: There was a 100 % stenosis.  CX:      Proximal circumflex: There was a diffuse 80 % stenosis.  RCA:      Proximal RCA: There was a 100 % stenosis.  GRAFTS:      Graft to the distal LAD: The graft was a LIMA. Graft  angiography showed minor luminal irregularities.  Graft to the 1st diagonal: The graft was a saphenous vein graft from the  aorta. It was occluded.  Graft to the 1st obtuse marginal: The graft was a saphenous vein graft from  the aorta. It was occluded.  Graft to the RPDA: The graft was a PREETI. Graft angiography showed minor  luminal irregularities.  Complications: There were no complications.  Recommendations:  The patient should continue with the present medications.  Impressions: Succesfull ROBY implantation. (LCX)  Prepared and signed by  Saul Acharya M.D.  Signed 04/12/2012 14:16:18    < end of copied text >    ECHO IN MY OFFICE 9/2017:   Conclusions:   1. Normal left ventricular size with mild global systolic function. EF 47%  2. Mild diastolic dysfunction.   3. Normal right ventricular size and function.   4. Moderate left atrial enlargement.     NST IN MY OFFICE 11/2016:  CONCLUSIONS: Abnormal Study.   Abnormal maximal exercise treadmill stress test.   Good exercise performance.   Abnormal myocardial perfusion SPECT images. There is an anterior, lateral and inferoapical MI with   mild norma-infarct ischemia of the lateral wall.   Normal left ventricular size and function. Calculated EF is 44%     	  ASSESSMENT/PLAN: 	58y Male with a history of hypertension, hypercholesterolemia, diabetes, transient palpitations, coronary artery disease, status post MI in November 2005 with four-vessel bypass in 2005 at [facility], s/p PCI to LCX in 2012 at [facility] (at that time LIMA-LAD patent, PREETI- RPDA patent, but SVG-D1 and SVG-OM occluded) admitted with fever/chills/SOB.  +Enterovirus on RVP, CXR with mild pulm edema.    -pt. appears more euvolemic today  -agree with PO lasix  -check TTE to assess LV function  -further workup including possible ischemic eval pending above    Renata Harvey MD

## 2018-10-01 NOTE — DISCHARGE NOTE ADULT - CARE PLAN
Principal Discharge DX:	Acute on chronic systolic heart failure  Goal:	To relieve and prevent worsening symptoms associated with congestive heart failure, to improve quality of life, and to treat underlying conditions such as coronary heart disease, high blood pressure, or diabetes, and to maintain euvolemia.  Assessment and plan of treatment:	Low salt diet, fluid restriction to 1500 ml daily, monitor your fluid intake and weight daily, exercise as tolerated 30 minutes daily, and follow up with your physician within 7 days.  Secondary Diagnosis:	Coronary artery disease involving native coronary artery of native heart without angina pectoris  Goal:	To be asymptomatic, to reduce risks factors such as hypertension, diabetes and hyperlipidemia to lower the risk of blood clots formation; and to prevent complications of coronary artery disease such as worsening chest pain, heart attack and death.  Assessment and plan of treatment:	Continue aspirin and Plavix, do not stop unless instructed by your physician.  Continue low salt, fat, cholesterol and carbohydrate diet. Follow up with cardiologist and primary care physician's routine appointment.  Secondary Diagnosis:	DM (diabetes mellitus)  Goal:	To maintain a normal blood glucose level and HgA1C level < 5.7 and to prevent diabetic complications such as uncontrolled diabetes, diabetic coma, blindness, renal failure, and amputations.  Assessment and plan of treatment:	Monitor finger sticks pre-meal and bedtime, low salt, fat and carbohydrate diet, minimize glucose intake.  Exercise daily for at least 30 minutes and weight loss.  Follow up with primary care physician and endocrinologist for routine Hemoglobin A1C checks and management.  Follow up with your ophthalmologist for routine yearly vision exams. Your diabetic medication regimen was adjusted while you were an inpatient. Take as directed.  Secondary Diagnosis:	Essential hypertension  Goal:	To maintain a normal blood pressure to prevent heart attack, stroke and renal failure.  Assessment and plan of treatment:	Low sodium and fat diet, continue anti-hypertensive medications, and follow up with primary care physician.  Secondary Diagnosis:	Hyperlipidemia, unspecified hyperlipidemia type  Goal:	To maintain normal cholesterol levels to prevent stroke, coronary artery disease, peripheral vascular disease and heart attacks.  Assessment and plan of treatment:	Low fat diet, exercise daily and continue current medications. Follow up with primary care physician and cardiologist for management.

## 2018-10-01 NOTE — PROGRESS NOTE ADULT - ASSESSMENT
57yo Male with hx of HTN, HLD, DM Type 2, and CAD, s/p ROBY in 2012, h/o CABG, sys CHF (4/2012: ventriculography EF 30%, TTE EF=40%) a/w hypertensive emergency c/b acute on chronic systolic HF; Found to have Enterovirus bronchitis with associated bronchoconstriction; improving clinically after BiPAP and aggressive diuresis

## 2018-10-01 NOTE — PROGRESS NOTE ADULT - PROBLEM SELECTOR PLAN 3
Made 1.4L of urine after 20mg of Lasix IVP; Clinically rapidly improving; Off BiPAP;  Respiratory distress seems to be resolved;   -VS q4h  -volume status improved, lungs CTAB today  -Lasix to 40 mg PO daily

## 2018-10-01 NOTE — PROGRESS NOTE ADULT - PROBLEM SELECTOR PLAN 2
c/b pulmonary edema; multifactorial etiology due to poor compliance with medications, uncontrolled BP, dietary indiscretions; mildly fluid overloaded; No evidence of acute ischemia; hsTrop elevated, delta=2; ProBNP= 897;   -daily weights  -Telemetry  -Strict I/O  -Cardiology consult appreciated-case d/w cardiology would like TTE prior to disharge  -volume status improved, lungs CTAB today  -change Lasix to 40 mg PO daily  -Unclear why not on ACEI or ARB

## 2018-10-02 VITALS — HEART RATE: 65 BPM | DIASTOLIC BLOOD PRESSURE: 79 MMHG | SYSTOLIC BLOOD PRESSURE: 141 MMHG

## 2018-10-02 DIAGNOSIS — Z71.89 OTHER SPECIFIED COUNSELING: ICD-10-CM

## 2018-10-02 LAB
BASOPHILS # BLD AUTO: 0.05 K/UL — SIGNIFICANT CHANGE UP (ref 0–0.2)
BASOPHILS NFR BLD AUTO: 0.7 % — SIGNIFICANT CHANGE UP (ref 0–2)
BUN SERPL-MCNC: 16 MG/DL — SIGNIFICANT CHANGE UP (ref 7–23)
CALCIUM SERPL-MCNC: 8.7 MG/DL — SIGNIFICANT CHANGE UP (ref 8.4–10.5)
CHLORIDE SERPL-SCNC: 98 MMOL/L — SIGNIFICANT CHANGE UP (ref 98–107)
CO2 SERPL-SCNC: 23 MMOL/L — SIGNIFICANT CHANGE UP (ref 22–31)
CREAT SERPL-MCNC: 0.94 MG/DL — SIGNIFICANT CHANGE UP (ref 0.5–1.3)
EOSINOPHIL # BLD AUTO: 0.22 K/UL — SIGNIFICANT CHANGE UP (ref 0–0.5)
EOSINOPHIL NFR BLD AUTO: 3 % — SIGNIFICANT CHANGE UP (ref 0–6)
GLUCOSE SERPL-MCNC: 279 MG/DL — HIGH (ref 70–99)
HCT VFR BLD CALC: 40.8 % — SIGNIFICANT CHANGE UP (ref 39–50)
HGB BLD-MCNC: 13.1 G/DL — SIGNIFICANT CHANGE UP (ref 13–17)
IMM GRANULOCYTES # BLD AUTO: 0.02 # — SIGNIFICANT CHANGE UP
IMM GRANULOCYTES NFR BLD AUTO: 0.3 % — SIGNIFICANT CHANGE UP (ref 0–1.5)
LYMPHOCYTES # BLD AUTO: 2.57 K/UL — SIGNIFICANT CHANGE UP (ref 1–3.3)
LYMPHOCYTES # BLD AUTO: 35 % — SIGNIFICANT CHANGE UP (ref 13–44)
MAGNESIUM SERPL-MCNC: 1.8 MG/DL — SIGNIFICANT CHANGE UP (ref 1.6–2.6)
MCHC RBC-ENTMCNC: 25.3 PG — LOW (ref 27–34)
MCHC RBC-ENTMCNC: 32.1 % — SIGNIFICANT CHANGE UP (ref 32–36)
MCV RBC AUTO: 78.8 FL — LOW (ref 80–100)
MONOCYTES # BLD AUTO: 0.85 K/UL — SIGNIFICANT CHANGE UP (ref 0–0.9)
MONOCYTES NFR BLD AUTO: 11.6 % — SIGNIFICANT CHANGE UP (ref 2–14)
NEUTROPHILS # BLD AUTO: 3.64 K/UL — SIGNIFICANT CHANGE UP (ref 1.8–7.4)
NEUTROPHILS NFR BLD AUTO: 49.4 % — SIGNIFICANT CHANGE UP (ref 43–77)
NRBC # FLD: 0 — SIGNIFICANT CHANGE UP
PLATELET # BLD AUTO: 232 K/UL — SIGNIFICANT CHANGE UP (ref 150–400)
PMV BLD: 11.7 FL — SIGNIFICANT CHANGE UP (ref 7–13)
POTASSIUM SERPL-MCNC: 3.9 MMOL/L — SIGNIFICANT CHANGE UP (ref 3.5–5.3)
POTASSIUM SERPL-SCNC: 3.9 MMOL/L — SIGNIFICANT CHANGE UP (ref 3.5–5.3)
RBC # BLD: 5.18 M/UL — SIGNIFICANT CHANGE UP (ref 4.2–5.8)
RBC # FLD: 14.7 % — HIGH (ref 10.3–14.5)
SODIUM SERPL-SCNC: 135 MMOL/L — SIGNIFICANT CHANGE UP (ref 135–145)
WBC # BLD: 7.35 K/UL — SIGNIFICANT CHANGE UP (ref 3.8–10.5)
WBC # FLD AUTO: 7.35 K/UL — SIGNIFICANT CHANGE UP (ref 3.8–10.5)

## 2018-10-02 PROCEDURE — 99239 HOSP IP/OBS DSCHRG MGMT >30: CPT

## 2018-10-02 PROCEDURE — 99232 SBSQ HOSP IP/OBS MODERATE 35: CPT | Mod: GC

## 2018-10-02 RX ORDER — FUROSEMIDE 40 MG
1 TABLET ORAL
Qty: 30 | Refills: 0
Start: 2018-10-02 | End: 2018-10-31

## 2018-10-02 RX ORDER — LISINOPRIL 2.5 MG/1
1 TABLET ORAL
Qty: 30 | Refills: 0
Start: 2018-10-02 | End: 2018-10-31

## 2018-10-02 RX ORDER — LISINOPRIL 2.5 MG/1
2.5 TABLET ORAL DAILY
Qty: 0 | Refills: 0 | Status: DISCONTINUED | OUTPATIENT
Start: 2018-10-02 | End: 2018-10-02

## 2018-10-02 RX ORDER — PANTOPRAZOLE SODIUM 20 MG/1
1 TABLET, DELAYED RELEASE ORAL
Qty: 0 | Refills: 0 | COMMUNITY

## 2018-10-02 RX ORDER — ENOXAPARIN SODIUM 100 MG/ML
30 INJECTION SUBCUTANEOUS
Qty: 5 | Refills: 0 | OUTPATIENT
Start: 2018-10-02 | End: 2018-10-31

## 2018-10-02 RX ORDER — ACETAMINOPHEN 500 MG
2 TABLET ORAL
Qty: 0 | Refills: 0 | COMMUNITY
Start: 2018-10-02

## 2018-10-02 RX ORDER — INSULIN LISPRO 100/ML
20 VIAL (ML) SUBCUTANEOUS
Qty: 5 | Refills: 0 | OUTPATIENT
Start: 2018-10-02 | End: 2018-10-31

## 2018-10-02 RX ORDER — METFORMIN HYDROCHLORIDE 850 MG/1
1 TABLET ORAL
Qty: 60 | Refills: 0 | OUTPATIENT
Start: 2018-10-02 | End: 2018-10-31

## 2018-10-02 RX ORDER — METOPROLOL TARTRATE 50 MG
1 TABLET ORAL
Qty: 60 | Refills: 0
Start: 2018-10-02 | End: 2018-10-31

## 2018-10-02 RX ORDER — METFORMIN HYDROCHLORIDE 850 MG/1
1 TABLET ORAL
Qty: 0 | Refills: 0 | COMMUNITY

## 2018-10-02 RX ORDER — INSULIN GLARGINE 100 [IU]/ML
25 INJECTION, SOLUTION SUBCUTANEOUS
Qty: 0 | Refills: 0 | COMMUNITY

## 2018-10-02 RX ORDER — INSULIN ASPART 100 [IU]/ML
10 INJECTION, SOLUTION SUBCUTANEOUS
Qty: 0 | Refills: 0 | COMMUNITY

## 2018-10-02 RX ORDER — PANTOPRAZOLE SODIUM 20 MG/1
1 TABLET, DELAYED RELEASE ORAL
Qty: 30 | Refills: 0
Start: 2018-10-02 | End: 2018-10-31

## 2018-10-02 RX ADMIN — INSULIN GLARGINE 25 UNIT(S): 100 INJECTION, SOLUTION SUBCUTANEOUS at 09:09

## 2018-10-02 RX ADMIN — CLOPIDOGREL BISULFATE 75 MILLIGRAM(S): 75 TABLET, FILM COATED ORAL at 11:53

## 2018-10-02 RX ADMIN — Medication 4: at 09:10

## 2018-10-02 RX ADMIN — Medication 17 UNIT(S): at 13:01

## 2018-10-02 RX ADMIN — Medication 25 MILLIGRAM(S): at 05:00

## 2018-10-02 RX ADMIN — Medication 17 UNIT(S): at 17:54

## 2018-10-02 RX ADMIN — Medication 40 MILLIGRAM(S): at 05:00

## 2018-10-02 RX ADMIN — Medication 25 MILLIGRAM(S): at 17:32

## 2018-10-02 RX ADMIN — Medication 17 UNIT(S): at 09:09

## 2018-10-02 RX ADMIN — ENOXAPARIN SODIUM 40 MILLIGRAM(S): 100 INJECTION SUBCUTANEOUS at 11:53

## 2018-10-02 RX ADMIN — Medication 81 MILLIGRAM(S): at 11:53

## 2018-10-02 RX ADMIN — Medication 6: at 17:54

## 2018-10-02 RX ADMIN — Medication 4: at 13:01

## 2018-10-02 RX ADMIN — PANTOPRAZOLE SODIUM 40 MILLIGRAM(S): 20 TABLET, DELAYED RELEASE ORAL at 05:00

## 2018-10-02 NOTE — PROGRESS NOTE ADULT - ATTENDING COMMENTS
CARDIOLOGY ATTENDING    Agree with above. Continue lasix. Awaiting echo.
Patient seen and examined.  Agree with above.   -TTE with moderate LV dysfunction - unchanged from prior TTE  -pt. now euvolemic  -no significant change in delta troponin - do not suspect acs  -symptoms likely in setting of viral URI   -no further inpatient cardiac workup needed at this time  -dc planning per primary team with outpatient follow up with Dr. Bria aHrvey MD
Please see follow-up appointments for patient listed above. Agree with insulin dosing as above.
discharge if no further workup from cards and fu discharge regeimen from endo 37 min

## 2018-10-02 NOTE — PROGRESS NOTE ADULT - SUBJECTIVE AND OBJECTIVE BOX
Chief Complaint/Follow-up on: T2DM mangament    HPI: 57yo Male with hx of HTN, HLD, DM Type 2, and CAD, s/p ROBY in 2012, h/o CABG, sys CHF (4/2012: ventriculography EF 30%, TTE EF=40%); The pt was BIBEMS c/o SOB. The patient states that he has experience URI symptoms for the past few days including subjective fever and chills. He developed cough and acute SOB, which prompted him to call EMS on 9/28/2018.    10/1/2018 he received Lantus 25 UI BID and lispro 17 UI with each meal but his blood sugar ranges between 170-370.      Subjective:   Today he states that he feels better, he has been eating TID. Reports no chest pain, headache, no  N/V/D, no  fever, no chills, no feet swelling  He reports shortness of breathing while taking a  few steps around his bed.  Subjective:    MEDICATIONS  (STANDING):  aspirin enteric coated 81 milliGRAM(s) Oral daily  atorvastatin 80 milliGRAM(s) Oral at bedtime  clopidogrel Tablet 75 milliGRAM(s) Oral daily  dextrose 5%. 1000 milliLiter(s) (50 mL/Hr) IV Continuous <Continuous>  dextrose 50% Injectable 12.5 Gram(s) IV Push once  dextrose 50% Injectable 25 Gram(s) IV Push once  dextrose 50% Injectable 25 Gram(s) IV Push once  enoxaparin Injectable 40 milliGRAM(s) SubCutaneous every 24 hours  furosemide    Tablet 40 milliGRAM(s) Oral daily  insulin glargine Injectable (LANTUS) 25 Unit(s) SubCutaneous two times a day  insulin lispro (HumaLOG) corrective regimen sliding scale   SubCutaneous three times a day before meals  insulin lispro (HumaLOG) corrective regimen sliding scale   SubCutaneous at bedtime  insulin lispro Injectable (HumaLOG) 17 Unit(s) SubCutaneous three times a day before meals  metoprolol tartrate 25 milliGRAM(s) Oral every 12 hours  pantoprazole    Tablet 40 milliGRAM(s) Oral before breakfast    MEDICATIONS  (PRN):  acetaminophen   Tablet .. 650 milliGRAM(s) Oral every 6 hours PRN Temp greater or equal to 38C (100.4F), Mild Pain (1 - 3), Moderate Pain (4 - 6)  dextrose 40% Gel 15 Gram(s) Oral once PRN Blood Glucose LESS THAN 70 milliGRAM(s)/deciliter  glucagon  Injectable 1 milliGRAM(s) IntraMuscular once PRN Glucose LESS THAN 70 milligrams/deciliter      PHYSICAL EXAM:  VITALS: T(C): 37 (10-02-18 @ 04:56)  T(F): 98.6 (10-02-18 @ 04:56), Max: 99.2 (10-01-18 @ 12:59)  HR: 73 (10-02-18 @ 04:56) (58 - 80)  BP: 153/83 (10-02-18 @ 04:56) (129/94 - 153/83)  RR:  (18 - 18)  SpO2:  (98% - 99%)  Wt(kg): --  GENERAL: NAD, well-groomed, well-developed  EYES: No proptosis, no injection  HEENT:  Atraumatic, Normocephalic, moist mucous membranes  THYROID: Normal size, no palpable nodules  RESPIRATORY: Clear to auscultation bilaterally; No rales, rhonchi, wheezing, or rubs  CARDIOVASCULAR: Regular rate and rhythm; No murmurs; no peripheral edema  GI: Soft, nontender, non distended, normal bowel sounds  CUSHING'S SIGNS: no striae  EXTREMITIES: no edema   SKIN: mild signs os acanthosis nigricans    POCT Blood Glucose.: 222 mg/dL (10-02-18 @ 08:56)  POCT Blood Glucose.: 367 mg/dL (10-01-18 @ 21:33)  POCT Blood Glucose.: 181 mg/dL (10-01-18 @ 17:20)  POCT Blood Glucose.: 246 mg/dL (10-01-18 @ 12:52)  POCT Blood Glucose.: 185 mg/dL (10-01-18 @ 08:39)  POCT Blood Glucose.: 353 mg/dL (09-30-18 @ 21:32)  POCT Blood Glucose.: 312 mg/dL (09-30-18 @ 17:04)  POCT Blood Glucose.: 353 mg/dL (09-30-18 @ 13:07)  POCT Blood Glucose.: 312 mg/dL (09-30-18 @ 09:08)  POCT Blood Glucose.: 354 mg/dL (09-29-18 @ 21:58)  POCT Blood Glucose.: 161 mg/dL (09-29-18 @ 17:37)  POCT Blood Glucose.: 324 mg/dL (09-29-18 @ 12:30)    10-02    135  |  98  |  16  ----------------------------<  279<H>  3.9   |  23  |  0.94    EGFR if : 104  EGFR if non : 90    Ca    8.7      10-02  Mg     1.8     10-02  Phos  3.6     10-01    Thyroid Function Tests:      Hemoglobin A1C, Whole Blood: 12.9 % <H> [4.0 - 5.6] (09-30-18 @ 05:30)

## 2018-10-02 NOTE — PROGRESS NOTE ADULT - PROBLEM SELECTOR PLAN 2
c/b pulmonary edema; multifactorial etiology due to poor compliance with medications, uncontrolled BP, dietary indiscretions; mildly fluid overloaded; No evidence of acute ischemia; hsTrop elevated, delta=2; ProBNP= 897;   -daily weights  -Telemetry  -Strict I/O  -TTE done needs to be reviewed by cardiology if further work up needed  -volume status improved, lungs CTAB today  -change Lasix to 40 mg PO daily  -start on lisinopril 2.5mg qdaily

## 2018-10-02 NOTE — PROGRESS NOTE ADULT - PROBLEM SELECTOR PLAN 1
c/b acute on chronic sys HF and pulm edema;  -Restart Metoprolol succinate ER --> tartrate BID while hospitalized  -appreciate cardiology recs  - BP normalized  -Hold off on starting Lisinopril to avoid too rapid BP normalization   -Telemetry, serial CE  -VS q4h
c/b acute on chronic sys HF and pulm edema;  -Restart Metoprolol succinate ER --> tartrate BID while hospitalized  -appreciate cardiology recs  - BP normalized  -Telemetry, serial CE  -VS q4h
- FS goal is 100-180  - Recommend Lantus 30 units BID  - Recommend Humalog 20 units TID AC and mod SSI AC and HS    Outpatient Plan  - Recommend Basal/bolus regimen as listed above  - Metformin 1000 mg BID  - Discontinue Januvia   - F/u with diabetes educator Shireen Lozoya at [address] [facility], [address] in [place] on 10/30/18 at Noon.   - F/u endocrinologist with Dr. Webb at [address] [place] [address], [address] on 01/02/19 at 1:45 pm. Call [phone] office number if needed.
- FS goal is 100-180  - Recommend Lantus 25 units BID  - Recommend Humalog 17 units TID AC and mod SSI AC and HS    Outpatient Plan  - Recommend Basal/bolus regimen (final doses to be determined)  - Consider Metformin based on EF after echocardiogram  - Discontinue Januvia   - F/u with diabetes educator Shireen Lozoya at [address] [facility], [address] in [place] on 10/30/18 at Noon.   - F/u endocrinologist with Dr. Webb at [address] [facility], [address] on 01/02/19 at 1:45 pm. Call [phone] office number if needed.

## 2018-10-02 NOTE — PROGRESS NOTE ADULT - SUBJECTIVE AND OBJECTIVE BOX
S: no chest pain or sob     MEDICATIONS  (STANDING):  aspirin enteric coated 81 milliGRAM(s) Oral daily  atorvastatin 80 milliGRAM(s) Oral at bedtime  clopidogrel Tablet 75 milliGRAM(s) Oral daily  enoxaparin Injectable 40 milliGRAM(s) SubCutaneous every 24 hours  furosemide    Tablet 40 milliGRAM(s) Oral daily  insulin glargine Injectable (LANTUS) 25 Unit(s) SubCutaneous two times a day  insulin lispro (HumaLOG) corrective regimen sliding scale   SubCutaneous three times a day before meals  insulin lispro (HumaLOG) corrective regimen sliding scale   SubCutaneous at bedtime  insulin lispro Injectable (HumaLOG) 17 Unit(s) SubCutaneous three times a day before meals  metoprolol tartrate 25 milliGRAM(s) Oral every 12 hours  pantoprazole    Tablet 40 milliGRAM(s) Oral before breakfast    MEDICATIONS  (PRN):  acetaminophen   Tablet .. 650 milliGRAM(s) Oral every 6 hours PRN Temp greater or equal to 38C (100.4F), Mild Pain (1 - 3), Moderate Pain (4 - 6)  dextrose 40% Gel 15 Gram(s) Oral once PRN Blood Glucose LESS THAN 70 milliGRAM(s)/deciliter  glucagon  Injectable 1 milliGRAM(s) IntraMuscular once PRN Glucose LESS THAN 70 milligrams/deciliter      LABS:                        13.1   7.35  )-----------( 232      ( 02 Oct 2018 06:32 )             40.8     135  |  98  |  16  ----------------------------<  279<H>  3.9   |  23  |  0.94    Ca    8.7      02 Oct 2018 06:32  Phos  3.6     10-01  Mg     1.8     10-02    Creatinine Trend: 0.94<--, 0.88<--, 0.94<--, 0.94<--     PHYSICAL EXAM  Vital Signs Last 24 Hrs  T(C): 37 (02 Oct 2018 04:56), Max: 37 (02 Oct 2018 04:56)  T(F): 98.6 (02 Oct 2018 04:56), Max: 98.6 (02 Oct 2018 04:56)  HR: 73 (02 Oct 2018 04:56) (58 - 73)  BP: 153/83 (02 Oct 2018 04:56) (129/94 - 153/83)  RR: 18 (02 Oct 2018 04:56) (18 - 18)  SpO2: 98% (02 Oct 2018 04:56) (98% - 99%)    Heart: normal S1, S2, RRR, no m/r/g  Lungs: Cta b/l  Abd: soft nT  Ext: no edema     EKG: NSR, old septal infarct age indet    < from: Xray Chest 1 View-PORTABLE IMMEDIATE (09.29.18 @ 00:39) >  IMPRESSION:   Mild pulmonary edema. Cardiomegaly.  < end of copied text >    < from: Transthoracic Echocardiogram w/Doppler (04.19.12 @ 07:42) >  CONCLUSIONS: EF 40%  Technically limited and difficult study.  1. Normal mitral valve. Minimal mitral regurgitation.  2. Mildly  calcified trileaflet aortic valve with normal  opening.  3. Endocardial visualization enhanced with intravenous  injection of echo contrast (Definity). Moderate segmental  left ventricular systolic dysfunction. The inferolateral  wall, apical lateral, distal anterior and apex are  hypokinetic. No obvious  left ventricular thrombus. EFabout   40%  4. The right ventricle is not well visualized; grossly  normal right ventricular systolic function.  5. Small pericardial effusion adjacent to right atrium.  *** Compared with echocardiogram  of 4/10/2012, the mitral  regurgitation is minimal on today's study.  ------------------------------------------------------------------------  Confirmed on  4/19/2012 - 17:01:57 by Deisy Earl MD    < end of copied text >      < from: Cardiac Cath Lab (04.11.12 @ 16:22) >    Ventricles: Global left ventricular function was severely depressed. EF  estimated by contrast ventriculography was 30 %.  Coronary vessels: The coronary circulation is right dominant.  LM:      LM: Angiography showed minor luminal irregularities with no flow  limiting lesions.  LAD:      Proximal LAD: There was a 100 % stenosis.  CX:      Proximal circumflex: There was a diffuse 80 % stenosis.  RCA:      Proximal RCA: There was a 100 % stenosis.  GRAFTS:      Graft to the distal LAD: The graft was a LIMA. Graft  angiography showed minor luminal irregularities.  Graft to the 1st diagonal: The graft was a saphenous vein graft from the  aorta. It was occluded.  Graft to the 1st obtuse marginal: The graft was a saphenous vein graft from  the aorta. It was occluded.  Graft to the RPDA: The graft was a PREETI. Graft angiography showed minor  luminal irregularities.  Complications: There were no complications.  Recommendations:  The patient should continue with the present medications.  Impressions: Succesfull ROBY implantation. (LCX)  Prepared and signed by  Saul Acharya M.D.  Signed 04/12/2012 14:16:18    < end of copied text >    ECHO IN MY OFFICE 9/2017:   Conclusions:   1. Normal left ventricular size with mild global systolic function. EF 47%  2. Mild diastolic dysfunction.   3. Normal right ventricular size and function.   4. Moderate left atrial enlargement.     NST IN MY OFFICE 11/2016:  CONCLUSIONS: Abnormal Study.   Abnormal maximal exercise treadmill stress test.   Good exercise performance.   Abnormal myocardial perfusion SPECT images. There is an anterior, lateral and inferoapical MI with   mild norma-infarct ischemia of the lateral wall.   Normal left ventricular size and function. Calculated EF is 44%     < from: TTE with Doppler (w/Cont) (10.01.18 @ 16:08) >  CONCLUSIONS:  Technically difficult study.  1. Mitral annular calcification, otherwise normal mitral  valve. Mild mitral regurgitation.  2. Endocardium not well visualized; grossly moderate global  left ventricular systolic dysfunction.  Endocardial  visualization enhanced with intravenous injection of echo  contrast (Definity).  3. Unable to accurately evaluate right ventricular size or  systolic function.  ------------------------------------------------------------------------  Confirmed on  10/1/2018 - 18:07:01 by Guanako Foley M.D.  ------------------------------------------------------------------------    < end of copied text >      	  ASSESSMENT/PLAN: 	58y Male with a history of hypertension, hypercholesterolemia, diabetes, transient palpitations, coronary artery disease, status post MI in November 2005 with four-vessel bypass in 2005 at [facility], s/p PCI to LCX in 2012 at [facility] (at that time LIMA-LAD patent, PREETI- RPDA patent, but SVG-D1 and SVG-OM occluded) admitted with fever/chills/SOB.  +Enterovirus on RVP, CXR with mild pulm edema.    --Cont Asa, plavix, statin, Lopressor.  Resume Lisinopril 5mg daily (was taking at home in my recent office notes)  --CHF compentsated, Cont PO lasix  --TTE noted above, limited study  --Will discuss with Dr Harvey

## 2018-10-02 NOTE — PROGRESS NOTE ADULT - REASON FOR ADMISSION
Respiratory distress;

## 2018-10-02 NOTE — PROGRESS NOTE ADULT - ASSESSMENT
57 y/o male with uncontrolled T2DM complicated by retinopathy s/p laser surgery in both eyes, CAD s/p CABG, CHF (EF 47%) here with CHF exacerbation. Secondary diagnosis also includes HTN and HLD.

## 2018-10-02 NOTE — PROGRESS NOTE ADULT - PROBLEM SELECTOR PLAN 5
Hyperglycemic, fingersticks > 300  A1c 12.9  currently on lantus/humalog  Endocrine consulted, appreciate recs-- F/u with diabetes educator Shireen Lozoya at [address] [facility], [address] in [place] on 10/30/18 at Noon.   - F/u endocrinologist with Dr. Webb at [address] [place] [address], [address] on 01/02/19 at 1:45 pm. Call [phone] office number if needed. await final discharge recs from endo  Monitor fingersticks for improvement  -FS as per endo

## 2018-10-02 NOTE — PROGRESS NOTE ADULT - SUBJECTIVE AND OBJECTIVE BOX
Patient is a 58y old  Male who presents with a chief complaint of Respiratory distress; (01 Oct 2018 15:45)      SUBJECTIVE / OVERNIGHT EVENTS: Pt in NAD no acute events ON. tele SR no acute events    MEDICATIONS  (STANDING):  aspirin enteric coated 81 milliGRAM(s) Oral daily  atorvastatin 80 milliGRAM(s) Oral at bedtime  clopidogrel Tablet 75 milliGRAM(s) Oral daily  dextrose 5%. 1000 milliLiter(s) (50 mL/Hr) IV Continuous <Continuous>  dextrose 50% Injectable 12.5 Gram(s) IV Push once  dextrose 50% Injectable 25 Gram(s) IV Push once  dextrose 50% Injectable 25 Gram(s) IV Push once  enoxaparin Injectable 40 milliGRAM(s) SubCutaneous every 24 hours  furosemide    Tablet 40 milliGRAM(s) Oral daily  insulin glargine Injectable (LANTUS) 25 Unit(s) SubCutaneous two times a day  insulin lispro (HumaLOG) corrective regimen sliding scale   SubCutaneous three times a day before meals  insulin lispro (HumaLOG) corrective regimen sliding scale   SubCutaneous at bedtime  insulin lispro Injectable (HumaLOG) 17 Unit(s) SubCutaneous three times a day before meals  metoprolol tartrate 25 milliGRAM(s) Oral every 12 hours  pantoprazole    Tablet 40 milliGRAM(s) Oral before breakfast    MEDICATIONS  (PRN):  acetaminophen   Tablet .. 650 milliGRAM(s) Oral every 6 hours PRN Temp greater or equal to 38C (100.4F), Mild Pain (1 - 3), Moderate Pain (4 - 6)  dextrose 40% Gel 15 Gram(s) Oral once PRN Blood Glucose LESS THAN 70 milliGRAM(s)/deciliter  glucagon  Injectable 1 milliGRAM(s) IntraMuscular once PRN Glucose LESS THAN 70 milligrams/deciliter        CAPILLARY BLOOD GLUCOSE      POCT Blood Glucose.: 367 mg/dL (01 Oct 2018 21:33)  POCT Blood Glucose.: 181 mg/dL (01 Oct 2018 17:20)  POCT Blood Glucose.: 246 mg/dL (01 Oct 2018 12:52)    I&O's Summary    01 Oct 2018 07:01  -  02 Oct 2018 07:00  --------------------------------------------------------  IN: 200 mL / OUT: 1150 mL / NET: -950 mL        T(C): 37 (10-02-18 @ 04:56), Max: 37.3 (10-01-18 @ 12:59)  HR: 73 (10-02-18 @ 04:56) (58 - 80)  BP: 153/83 (10-02-18 @ 04:56) (129/94 - 153/83)  RR: 18 (10-02-18 @ 04:56) (18 - 18)  SpO2: 98% (10-02-18 @ 04:56) (98% - 99%)    PHYSICAL EXAM:  GENERAL: NAD, well-developed  HEAD:  Atraumatic, Normocephalic  EYES: EOMI, PERRLA, conjunctiva and sclera clear  NECK: Supple, No JVD  CHEST/LUNG: Clear to auscultation bilaterally; No wheeze  HEART: Regular rate and rhythm; No murmurs, rubs, or gallops  ABDOMEN: Soft, Nontender, Nondistended; Bowel sounds present  EXTREMITIES:  2+ Peripheral Pulses, No clubbing, cyanosis, or edema  PSYCH: AAOx3  NEUROLOGY: non-focal  SKIN: No rashes or lesions    LABS:                        13.1   7.35  )-----------( 232      ( 02 Oct 2018 06:32 )             40.8     10-02    135  |  98  |  16  ----------------------------<  279<H>  3.9   |  23  |  0.94    Ca    8.7      02 Oct 2018 06:32  Phos  3.6     10-01  Mg     1.8     10-02                  RADIOLOGY & ADDITIONAL TESTS:    Imaging Personally Reviewed:< from: TTE with Doppler (w/Cont) (10.01.18 @ 16:08) >  Patient name: SARITHA DENTON  YOB: 1960   Age: 58 (M)   MR#: [id number]  Study Date: 10/1/2018  [facility]  Study quality: Technically Difficult  Referring Physician: Elvin Damon MD  Blood Pressure: 131/90mmHg  Height: 167 cm  Weight: 90 kg  BSA: 2 m2  ------------------------------------------------------------------------  PROCEDURE: Transthoracic echocardiogram with 2-D, M-Mode  and complete spectral and color flow Doppler.  Verbal consent was obtained for injection of echo contrast.  Following  intravenous injection of contrast, harmonic  imaging was performed.  INDICATION: Unspecified systolic (congestive) heart failure  (I50.20)  ------------------------------------------------------------------------  OBSERVATIONS:  Mitral Valve: Mitral annular calcification, otherwise  normal mitral valve. Mild mitral regurgitation.  Aortic Root: Normal aortic root.  Aortic Valve: Aortic valve leaflet morphology not well  visualized.  Left Atrium: Normal left atrium.  Left Ventricle: Endocardium not well visualized; grossly  moderate global left ventricular systolic dysfunction.  Endocardial visualization enhanced with intravenous  injection of echo contrast (Definity).  Right Heart: Normal right atrium. Unable to accurately  evaluate right ventricular size or systolic function.  Normal tricuspid valve. Minimal tricuspid regurgitation.  Normal pulmonic valve.  Pericardium/PleuraSmall pericardial effusion.  ------------------------------------------------------------------------  CONCLUSIONS:  Technically difficult study.  1. Mitral annular calcification, otherwise normal mitral  valve. Mild mitral regurgitation.  2. Endocardium not well visualized; grossly moderate global  left ventricular systolic dysfunction.  Endocardial  visualization enhanced with intravenous injection of echo  contrast (Definity).  3. Unable to accurately evaluate right ventricular size or  systolic function.    < end of copied text >      Consultant(s) Notes Reviewed:      Care Discussed with Consultants/Other Providers:

## 2018-10-02 NOTE — PROGRESS NOTE ADULT - PROBLEM SELECTOR PROBLEM 1
Hypertensive emergency
Uncontrolled type 2 diabetes mellitus with hyperglycemia
Uncontrolled type 2 diabetes mellitus with hyperglycemia

## 2018-10-04 LAB
BACTERIA BLD CULT: SIGNIFICANT CHANGE UP
BACTERIA BLD CULT: SIGNIFICANT CHANGE UP

## 2018-10-29 ENCOUNTER — OUTPATIENT (OUTPATIENT)
Dept: OUTPATIENT SERVICES | Facility: HOSPITAL | Age: 58
LOS: 1 days | End: 2018-10-29
Payer: MEDICAID

## 2018-10-29 VITALS
HEART RATE: 67 BPM | SYSTOLIC BLOOD PRESSURE: 143 MMHG | OXYGEN SATURATION: 98 % | WEIGHT: 190.04 LBS | HEIGHT: 66 IN | DIASTOLIC BLOOD PRESSURE: 73 MMHG | TEMPERATURE: 97 F | RESPIRATION RATE: 16 BRPM

## 2018-10-29 DIAGNOSIS — Z98.890 OTHER SPECIFIED POSTPROCEDURAL STATES: Chronic | ICD-10-CM

## 2018-10-29 DIAGNOSIS — I50.22 CHRONIC SYSTOLIC (CONGESTIVE) HEART FAILURE: ICD-10-CM

## 2018-10-29 LAB
ALBUMIN SERPL ELPH-MCNC: 3.9 G/DL — SIGNIFICANT CHANGE UP (ref 3.3–5)
ALP SERPL-CCNC: 83 U/L — SIGNIFICANT CHANGE UP (ref 40–120)
ALT FLD-CCNC: 25 U/L — SIGNIFICANT CHANGE UP (ref 10–45)
ANION GAP SERPL CALC-SCNC: 11 MMOL/L — SIGNIFICANT CHANGE UP (ref 5–17)
AST SERPL-CCNC: 16 U/L — SIGNIFICANT CHANGE UP (ref 10–40)
BILIRUB SERPL-MCNC: 0.5 MG/DL — SIGNIFICANT CHANGE UP (ref 0.2–1.2)
BUN SERPL-MCNC: 18 MG/DL — SIGNIFICANT CHANGE UP (ref 7–23)
CALCIUM SERPL-MCNC: 9.5 MG/DL — SIGNIFICANT CHANGE UP (ref 8.4–10.5)
CHLORIDE SERPL-SCNC: 101 MMOL/L — SIGNIFICANT CHANGE UP (ref 96–108)
CO2 SERPL-SCNC: 26 MMOL/L — SIGNIFICANT CHANGE UP (ref 22–31)
CREAT SERPL-MCNC: 0.96 MG/DL — SIGNIFICANT CHANGE UP (ref 0.5–1.3)
GLUCOSE SERPL-MCNC: 127 MG/DL — HIGH (ref 70–99)
HCT VFR BLD CALC: 38.6 % — LOW (ref 39–50)
HGB BLD-MCNC: 12.7 G/DL — LOW (ref 13–17)
MCHC RBC-ENTMCNC: 26 PG — LOW (ref 27–34)
MCHC RBC-ENTMCNC: 32.9 GM/DL — SIGNIFICANT CHANGE UP (ref 32–36)
MCV RBC AUTO: 78.9 FL — LOW (ref 80–100)
PLATELET # BLD AUTO: 166 K/UL — SIGNIFICANT CHANGE UP (ref 150–400)
POTASSIUM SERPL-MCNC: 4.1 MMOL/L — SIGNIFICANT CHANGE UP (ref 3.5–5.3)
POTASSIUM SERPL-SCNC: 4.1 MMOL/L — SIGNIFICANT CHANGE UP (ref 3.5–5.3)
PROT SERPL-MCNC: 7.1 G/DL — SIGNIFICANT CHANGE UP (ref 6–8.3)
RBC # BLD: 4.89 M/UL — SIGNIFICANT CHANGE UP (ref 4.2–5.8)
RBC # FLD: 15.2 % — HIGH (ref 10.3–14.5)
SODIUM SERPL-SCNC: 138 MMOL/L — SIGNIFICANT CHANGE UP (ref 135–145)
WBC # BLD: 6.9 K/UL — SIGNIFICANT CHANGE UP (ref 3.8–10.5)
WBC # FLD AUTO: 6.9 K/UL — SIGNIFICANT CHANGE UP (ref 3.8–10.5)

## 2018-10-29 PROCEDURE — 93010 ELECTROCARDIOGRAM REPORT: CPT | Mod: 76

## 2018-10-29 PROCEDURE — 93005 ELECTROCARDIOGRAM TRACING: CPT

## 2018-10-29 PROCEDURE — C1887: CPT

## 2018-10-29 PROCEDURE — 85027 COMPLETE CBC AUTOMATED: CPT

## 2018-10-29 PROCEDURE — C1894: CPT

## 2018-10-29 PROCEDURE — C1769: CPT

## 2018-10-29 PROCEDURE — 80053 COMPREHEN METABOLIC PANEL: CPT

## 2018-10-29 PROCEDURE — C1760: CPT

## 2018-10-29 NOTE — H&P CARDIOLOGY - HISTORY OF PRESENT ILLNESS
This is a 59yo  Male with hx of HTN, HLD, DM Type 2 ( poorly controlled, last A1C on record was 12.9 in September), and CAD, s/p ROBY in 2012 ( at [facility]), h/o CABGX4 in 11/2005 at [facility], s CHF (4/2012: ventriculography EF 30%, TTE EF=40%) recently admitted with CHF exacerbation COPD. Presents to Dr Fraser for cardiac follow up visit after recent admission for CHF exacerbation ( treated with IV Lasix); patient states he is feeling better, no longer has any worsening dyspnea. He denies chest pain, dizziness, palpitations, N&V, HA, syncope, near syncope.  Of note patient has not had ischemic evaluation while he was hospitalized, therefore was sent here today for cardiac cath.  Presently continues to be asymptomatic.     < from: Cardiac Cath Lab (04.11.12 @ 16:22) >  Ventricles: Global left ventricular function was severely depressed. EF  estimated by contrast ventriculography was 30 %.  Coronary vessels: The coronary circulation is right dominant.  LM:      LM: Angiography showed minor luminal irregularities with no flow  limiting lesions.  LAD:      Proximal LAD: There was a 100 % stenosis.  CX:      Proximal circumflex: There was a diffuse 80 % stenosis.  RCA:      Proximal RCA: There was a 100 % stenosis.  GRAFTS:      Graft to the distal LAD: The graft was a LIMA. Graft  angiography showed minor luminal irregularities.  Graft to the 1st diagonal: The graft was a saphenous vein graft from the  aorta. It was occluded.  Graft to the 1st obtuse marginal: The graft was a saphenous vein graft from  the aorta. It was occluded.  Graft to the RPDA: The graft was a PREETI. Graft angiography showed minor  luminal irregularities.  Complications: There were no complications.  Recommendations:  The patient should continue with the present medications.  Impressions: Succesfull ROBY implantation.  Prepared and signed by  Saul Acharya M.D.    < end of copied text >    < from: TTE with Doppler (w/Cont) (10.01.18 @ 16:08) >  CONCLUSIONS:  Technically difficult study.  1. Mitral annular calcification, otherwise normal mitral  valve. Mild mitral regurgitation.  2. Endocardium not well visualized; grossly moderate global  left ventricular systolic dysfunction.  Endocardial  visualization enhanced with intravenous injection of echo  contrast (Definity).  3. Unable to accurately evaluate right ventricular size or  systolic function.    < end of copied text >

## 2018-10-29 NOTE — H&P CARDIOLOGY - PMH
CAD (coronary artery disease)    CHF (congestive heart failure)    COPD (chronic obstructive pulmonary disease)    DM (diabetes mellitus)    HLD (hyperlipidemia)    HTN - Hypertension

## 2018-10-29 NOTE — H&P CARDIOLOGY - PSH
H/O cardiac catheterization    S/P CABG (coronary artery bypass graft)  2005 H/O arthroscopic knee surgery    H/O cardiac catheterization    S/P CABG (coronary artery bypass graft)  2005

## 2018-10-29 NOTE — H&P CARDIOLOGY - CARDIOVASCULAR
details… detailed exam negative Regular rate & rhythm, normal S1, S2; no murmurs, gallops or rubs; no S3, S4

## 2018-10-30 ENCOUNTER — APPOINTMENT (OUTPATIENT)
Dept: ENDOCRINOLOGY | Facility: CLINIC | Age: 58
End: 2018-10-30

## 2019-01-02 ENCOUNTER — APPOINTMENT (OUTPATIENT)
Dept: ENDOCRINOLOGY | Facility: CLINIC | Age: 59
End: 2019-01-02

## 2019-03-01 ENCOUNTER — INPATIENT (INPATIENT)
Facility: HOSPITAL | Age: 59
LOS: 2 days | Discharge: HOME | End: 2019-03-04
Attending: INTERNAL MEDICINE | Admitting: INTERNAL MEDICINE
Payer: MEDICAID

## 2019-03-01 VITALS
DIASTOLIC BLOOD PRESSURE: 80 MMHG | OXYGEN SATURATION: 97 % | HEART RATE: 92 BPM | SYSTOLIC BLOOD PRESSURE: 179 MMHG | RESPIRATION RATE: 16 BRPM | TEMPERATURE: 99 F

## 2019-03-01 DIAGNOSIS — K21.9 GASTRO-ESOPHAGEAL REFLUX DISEASE WITHOUT ESOPHAGITIS: ICD-10-CM

## 2019-03-01 DIAGNOSIS — I50.9 HEART FAILURE, UNSPECIFIED: ICD-10-CM

## 2019-03-01 DIAGNOSIS — I10 ESSENTIAL (PRIMARY) HYPERTENSION: ICD-10-CM

## 2019-03-01 DIAGNOSIS — E78.5 HYPERLIPIDEMIA, UNSPECIFIED: ICD-10-CM

## 2019-03-01 DIAGNOSIS — I25.10 ATHEROSCLEROTIC HEART DISEASE OF NATIVE CORONARY ARTERY WITHOUT ANGINA PECTORIS: ICD-10-CM

## 2019-03-01 DIAGNOSIS — Z98.890 OTHER SPECIFIED POSTPROCEDURAL STATES: Chronic | ICD-10-CM

## 2019-03-01 DIAGNOSIS — J44.9 CHRONIC OBSTRUCTIVE PULMONARY DISEASE, UNSPECIFIED: ICD-10-CM

## 2019-03-01 DIAGNOSIS — I50.23 ACUTE ON CHRONIC SYSTOLIC (CONGESTIVE) HEART FAILURE: ICD-10-CM

## 2019-03-01 DIAGNOSIS — Z29.9 ENCOUNTER FOR PROPHYLACTIC MEASURES, UNSPECIFIED: ICD-10-CM

## 2019-03-01 DIAGNOSIS — E11.9 TYPE 2 DIABETES MELLITUS WITHOUT COMPLICATIONS: ICD-10-CM

## 2019-03-01 LAB
ALBUMIN SERPL ELPH-MCNC: 4 G/DL — SIGNIFICANT CHANGE UP (ref 3.3–5)
ALP SERPL-CCNC: 94 U/L — SIGNIFICANT CHANGE UP (ref 40–120)
ALT FLD-CCNC: 36 U/L — SIGNIFICANT CHANGE UP (ref 4–41)
ANION GAP SERPL CALC-SCNC: 12 MMO/L — SIGNIFICANT CHANGE UP (ref 7–14)
APTT BLD: 31.7 SEC — SIGNIFICANT CHANGE UP (ref 27.5–36.3)
AST SERPL-CCNC: 26 U/L — SIGNIFICANT CHANGE UP (ref 4–40)
BASOPHILS # BLD AUTO: 0.05 K/UL — SIGNIFICANT CHANGE UP (ref 0–0.2)
BASOPHILS NFR BLD AUTO: 0.6 % — SIGNIFICANT CHANGE UP (ref 0–2)
BILIRUB SERPL-MCNC: 0.4 MG/DL — SIGNIFICANT CHANGE UP (ref 0.2–1.2)
BUN SERPL-MCNC: 15 MG/DL — SIGNIFICANT CHANGE UP (ref 7–23)
CALCIUM SERPL-MCNC: 9.5 MG/DL — SIGNIFICANT CHANGE UP (ref 8.4–10.5)
CHLORIDE SERPL-SCNC: 103 MMOL/L — SIGNIFICANT CHANGE UP (ref 98–107)
CO2 SERPL-SCNC: 22 MMOL/L — SIGNIFICANT CHANGE UP (ref 22–31)
CREAT SERPL-MCNC: 0.85 MG/DL — SIGNIFICANT CHANGE UP (ref 0.5–1.3)
EOSINOPHIL # BLD AUTO: 0.07 K/UL — SIGNIFICANT CHANGE UP (ref 0–0.5)
EOSINOPHIL NFR BLD AUTO: 0.8 % — SIGNIFICANT CHANGE UP (ref 0–6)
GLUCOSE BLDC GLUCOMTR-MCNC: 102 MG/DL — HIGH (ref 70–99)
GLUCOSE BLDC GLUCOMTR-MCNC: 144 MG/DL — HIGH (ref 70–99)
GLUCOSE BLDC GLUCOMTR-MCNC: 245 MG/DL — HIGH (ref 70–99)
GLUCOSE SERPL-MCNC: 261 MG/DL — HIGH (ref 70–99)
HCT VFR BLD CALC: 35.5 % — LOW (ref 39–50)
HGB BLD-MCNC: 11 G/DL — LOW (ref 13–17)
IMM GRANULOCYTES NFR BLD AUTO: 0.2 % — SIGNIFICANT CHANGE UP (ref 0–1.5)
INR BLD: 1.04 — SIGNIFICANT CHANGE UP (ref 0.88–1.17)
LYMPHOCYTES # BLD AUTO: 1.7 K/UL — SIGNIFICANT CHANGE UP (ref 1–3.3)
LYMPHOCYTES # BLD AUTO: 19.9 % — SIGNIFICANT CHANGE UP (ref 13–44)
MCHC RBC-ENTMCNC: 23.9 PG — LOW (ref 27–34)
MCHC RBC-ENTMCNC: 31 % — LOW (ref 32–36)
MCV RBC AUTO: 77.2 FL — LOW (ref 80–100)
MONOCYTES # BLD AUTO: 0.6 K/UL — SIGNIFICANT CHANGE UP (ref 0–0.9)
MONOCYTES NFR BLD AUTO: 7 % — SIGNIFICANT CHANGE UP (ref 2–14)
NEUTROPHILS # BLD AUTO: 6.1 K/UL — SIGNIFICANT CHANGE UP (ref 1.8–7.4)
NEUTROPHILS NFR BLD AUTO: 71.5 % — SIGNIFICANT CHANGE UP (ref 43–77)
NRBC # FLD: 0 K/UL — LOW (ref 25–125)
NT-PROBNP SERPL-SCNC: 809.7 PG/ML — SIGNIFICANT CHANGE UP
PLATELET # BLD AUTO: 352 K/UL — SIGNIFICANT CHANGE UP (ref 150–400)
PMV BLD: 10.5 FL — SIGNIFICANT CHANGE UP (ref 7–13)
POTASSIUM SERPL-MCNC: 4.5 MMOL/L — SIGNIFICANT CHANGE UP (ref 3.5–5.3)
POTASSIUM SERPL-SCNC: 4.5 MMOL/L — SIGNIFICANT CHANGE UP (ref 3.5–5.3)
PROT SERPL-MCNC: 6.9 G/DL — SIGNIFICANT CHANGE UP (ref 6–8.3)
PROTHROM AB SERPL-ACNC: 11.9 SEC — SIGNIFICANT CHANGE UP (ref 9.8–13.1)
RBC # BLD: 4.6 M/UL — SIGNIFICANT CHANGE UP (ref 4.2–5.8)
RBC # FLD: 15.8 % — HIGH (ref 10.3–14.5)
SODIUM SERPL-SCNC: 137 MMOL/L — SIGNIFICANT CHANGE UP (ref 135–145)
TROPONIN T, HIGH SENSITIVITY: 39 NG/L — SIGNIFICANT CHANGE UP (ref ?–14)
TROPONIN T, HIGH SENSITIVITY: 40 NG/L — SIGNIFICANT CHANGE UP (ref ?–14)
WBC # BLD: 8.54 K/UL — SIGNIFICANT CHANGE UP (ref 3.8–10.5)
WBC # FLD AUTO: 8.54 K/UL — SIGNIFICANT CHANGE UP (ref 3.8–10.5)

## 2019-03-01 PROCEDURE — 71046 X-RAY EXAM CHEST 2 VIEWS: CPT | Mod: 26

## 2019-03-01 RX ORDER — GLUCAGON INJECTION, SOLUTION 0.5 MG/.1ML
1 INJECTION, SOLUTION SUBCUTANEOUS ONCE
Qty: 0 | Refills: 0 | Status: DISCONTINUED | OUTPATIENT
Start: 2019-03-01 | End: 2019-03-04

## 2019-03-01 RX ORDER — DEXTROSE 50 % IN WATER 50 %
15 SYRINGE (ML) INTRAVENOUS ONCE
Qty: 0 | Refills: 0 | Status: DISCONTINUED | OUTPATIENT
Start: 2019-03-01 | End: 2019-03-04

## 2019-03-01 RX ORDER — INSULIN LISPRO 100/ML
VIAL (ML) SUBCUTANEOUS AT BEDTIME
Qty: 0 | Refills: 0 | Status: DISCONTINUED | OUTPATIENT
Start: 2019-03-01 | End: 2019-03-04

## 2019-03-01 RX ORDER — DEXTROSE 50 % IN WATER 50 %
12.5 SYRINGE (ML) INTRAVENOUS ONCE
Qty: 0 | Refills: 0 | Status: DISCONTINUED | OUTPATIENT
Start: 2019-03-01 | End: 2019-03-04

## 2019-03-01 RX ORDER — FUROSEMIDE 40 MG
40 TABLET ORAL DAILY
Qty: 0 | Refills: 0 | Status: DISCONTINUED | OUTPATIENT
Start: 2019-03-01 | End: 2019-03-03

## 2019-03-01 RX ORDER — ASPIRIN/CALCIUM CARB/MAGNESIUM 324 MG
81 TABLET ORAL
Qty: 0 | Refills: 0 | COMMUNITY

## 2019-03-01 RX ORDER — INSULIN LISPRO 100/ML
VIAL (ML) SUBCUTANEOUS
Qty: 0 | Refills: 0 | Status: DISCONTINUED | OUTPATIENT
Start: 2019-03-01 | End: 2019-03-04

## 2019-03-01 RX ORDER — ASPIRIN/CALCIUM CARB/MAGNESIUM 324 MG
81 TABLET ORAL DAILY
Qty: 0 | Refills: 0 | Status: DISCONTINUED | OUTPATIENT
Start: 2019-03-01 | End: 2019-03-04

## 2019-03-01 RX ORDER — FUROSEMIDE 40 MG
40 TABLET ORAL ONCE
Qty: 0 | Refills: 0 | Status: COMPLETED | OUTPATIENT
Start: 2019-03-01 | End: 2019-03-01

## 2019-03-01 RX ORDER — PANTOPRAZOLE SODIUM 20 MG/1
40 TABLET, DELAYED RELEASE ORAL
Qty: 0 | Refills: 0 | Status: DISCONTINUED | OUTPATIENT
Start: 2019-03-01 | End: 2019-03-04

## 2019-03-01 RX ORDER — ERGOCALCIFEROL 1.25 MG/1
50000 CAPSULE ORAL
Qty: 0 | Refills: 0 | Status: DISCONTINUED | OUTPATIENT
Start: 2019-03-01 | End: 2019-03-04

## 2019-03-01 RX ORDER — INSULIN LISPRO 100/ML
20 VIAL (ML) SUBCUTANEOUS
Qty: 0 | Refills: 0 | Status: DISCONTINUED | OUTPATIENT
Start: 2019-03-01 | End: 2019-03-04

## 2019-03-01 RX ORDER — ATORVASTATIN CALCIUM 80 MG/1
80 TABLET, FILM COATED ORAL AT BEDTIME
Qty: 0 | Refills: 0 | Status: DISCONTINUED | OUTPATIENT
Start: 2019-03-01 | End: 2019-03-04

## 2019-03-01 RX ORDER — DEXTROSE 50 % IN WATER 50 %
25 SYRINGE (ML) INTRAVENOUS ONCE
Qty: 0 | Refills: 0 | Status: DISCONTINUED | OUTPATIENT
Start: 2019-03-01 | End: 2019-03-04

## 2019-03-01 RX ORDER — CLOPIDOGREL BISULFATE 75 MG/1
75 TABLET, FILM COATED ORAL DAILY
Qty: 0 | Refills: 0 | Status: DISCONTINUED | OUTPATIENT
Start: 2019-03-01 | End: 2019-03-04

## 2019-03-01 RX ORDER — METOPROLOL TARTRATE 50 MG
25 TABLET ORAL
Qty: 0 | Refills: 0 | Status: DISCONTINUED | OUTPATIENT
Start: 2019-03-01 | End: 2019-03-04

## 2019-03-01 RX ORDER — SODIUM CHLORIDE 9 MG/ML
1000 INJECTION, SOLUTION INTRAVENOUS
Qty: 0 | Refills: 0 | Status: DISCONTINUED | OUTPATIENT
Start: 2019-03-01 | End: 2019-03-04

## 2019-03-01 RX ORDER — INSULIN GLARGINE 100 [IU]/ML
40 INJECTION, SOLUTION SUBCUTANEOUS EVERY MORNING
Qty: 0 | Refills: 0 | Status: DISCONTINUED | OUTPATIENT
Start: 2019-03-02 | End: 2019-03-04

## 2019-03-01 RX ORDER — ATORVASTATIN CALCIUM 80 MG/1
80 TABLET, FILM COATED ORAL
Qty: 0 | Refills: 0 | COMMUNITY

## 2019-03-01 RX ORDER — LISINOPRIL 2.5 MG/1
2.5 TABLET ORAL DAILY
Qty: 0 | Refills: 0 | Status: DISCONTINUED | OUTPATIENT
Start: 2019-03-01 | End: 2019-03-04

## 2019-03-01 RX ORDER — PREGABALIN 225 MG/1
1000 CAPSULE ORAL DAILY
Qty: 0 | Refills: 0 | Status: DISCONTINUED | OUTPATIENT
Start: 2019-03-01 | End: 2019-03-04

## 2019-03-01 RX ORDER — HEPARIN SODIUM 5000 [USP'U]/ML
5000 INJECTION INTRAVENOUS; SUBCUTANEOUS EVERY 8 HOURS
Qty: 0 | Refills: 0 | Status: DISCONTINUED | OUTPATIENT
Start: 2019-03-01 | End: 2019-03-04

## 2019-03-01 RX ORDER — INSULIN GLARGINE 100 [IU]/ML
40 INJECTION, SOLUTION SUBCUTANEOUS AT BEDTIME
Qty: 0 | Refills: 0 | Status: DISCONTINUED | OUTPATIENT
Start: 2019-03-01 | End: 2019-03-04

## 2019-03-01 RX ADMIN — Medication 2: at 16:59

## 2019-03-01 RX ADMIN — Medication 20 UNIT(S): at 17:00

## 2019-03-01 RX ADMIN — Medication 25 MILLIGRAM(S): at 18:22

## 2019-03-01 RX ADMIN — Medication 40 MILLIGRAM(S): at 14:18

## 2019-03-01 RX ADMIN — HEPARIN SODIUM 5000 UNIT(S): 5000 INJECTION INTRAVENOUS; SUBCUTANEOUS at 23:07

## 2019-03-01 RX ADMIN — INSULIN GLARGINE 40 UNIT(S): 100 INJECTION, SOLUTION SUBCUTANEOUS at 23:10

## 2019-03-01 RX ADMIN — ATORVASTATIN CALCIUM 80 MILLIGRAM(S): 80 TABLET, FILM COATED ORAL at 23:07

## 2019-03-01 NOTE — H&P ADULT - PMH
CAD (coronary artery disease)    CHF (congestive heart failure)    COPD (chronic obstructive pulmonary disease)    DM (diabetes mellitus)    GERD (gastroesophageal reflux disease)    HLD (hyperlipidemia)    HTN (hypertension)

## 2019-03-01 NOTE — H&P ADULT - NEGATIVE OPHTHALMOLOGIC SYMPTOMS
no blurred vision R/no pain R/no loss of vision L/no loss of vision R/no photophobia/no blurred vision L/no pain L/no diplopia

## 2019-03-01 NOTE — H&P ADULT - NEGATIVE GASTROINTESTINAL SYMPTOMS
no change in bowel habits/no vomiting/no diarrhea/no nausea/no flatulence/no abdominal pain/no melena/no hematochezia/no constipation

## 2019-03-01 NOTE — H&P ADULT - NEGATIVE NEUROLOGICAL SYMPTOMS
no headache/no vertigo/no loss of sensation/no difficulty walking/no confusion/no loss of consciousness/no hemiparesis/no tremors/no transient paralysis/no weakness/no paresthesias/no generalized seizures/no focal seizures/no syncope

## 2019-03-01 NOTE — H&P ADULT - PROBLEM SELECTOR PLAN 6
Continue basal/bolus insulin with insulin sliding scale for coverage  Hold Metformin  Check HgA1C  Monitor finger sticks  Diabetic diet

## 2019-03-01 NOTE — H&P ADULT - RS GEN PE MLT RESP DETAILS PC
respirations non-labored/no chest wall tenderness/no intercostal retractions/airway patent/no wheezes/no rhonchi/rales

## 2019-03-01 NOTE — H&P ADULT - NSHPSOCIALHISTORY_GEN_ALL_CORE
. Lives with wife. Unemployed. Denies smoking and drinking. Independent at baseline. Up to date with flu and pneumonia vaccines.

## 2019-03-01 NOTE — ED ADULT TRIAGE NOTE - CHIEF COMPLAINT QUOTE
arrives with EMS from PMD office, sent for CHF exacerbation, lower extremity edema. (+) RESENDIZ.  Patient admitted for CHF, 3 months ago.  Respirations even and unlabored. PMH: CHF, COPD, CAD, DM, HTN, HLD, cardiac cath, CABG

## 2019-03-01 NOTE — ED PROVIDER NOTE - OBJECTIVE STATEMENT
59m w hx CAD s/p CABG, CHF, COPD, DM, HTN sent in by pmd for chf exacerbation. Pt states has had "funny feeling" when walking for the last 3-4 days. Was unsure what it was but denies any chest pain. Went to PMD today who told him was SOB, which he confirms. No fever or cough. Has been taking all meds incl lasix as prescribed.

## 2019-03-01 NOTE — H&P ADULT - NSHPLABSRESULTS_GEN_ALL_CORE
October 2018: Grossly moderate global left ventricular systolic dysfunction.  ----------  EKG: NSR at 81 bpm with sinus arrhythmia, LAFB, LVH, ILBBB, QTc 483  CE x2: Trop 39-->40  H/H: 11.0/35.5  Glucose: 261  ProBNP: 809.7

## 2019-03-01 NOTE — H&P ADULT - HISTORY OF PRESENT ILLNESS
60 y/o male with a PMHx of CAD S/P CABG, ischemic cardiomyopathy with moderate LV dysfunction, COPD not on home oxygen, HTN, HLD, DM and GERD presents to ED with dyspnea on exertion. Pt reports that he has been feeling mildly short of breath on exertion for the past 3-4 days. Pt says that his symptoms are very mild and he insists that he is "okay." Pt also reports mild swelling in his legs bilaterally. Pt admits to being compliant with his medications and his dietary restrictions. Pt saw his primary care physician today for follow up and was told that he "had water in his lungs;" pt was sent to the ED for further workup. Pt denies fever, chills, recent travel, sick contacts, headache, dizziness, visual deficits, chest pain, shortness of breath at rest, palpitations, abdominal pain, N/V/D/C, hematochezia, melena, dysuria, hematuria, LOC, syncope. Upon arrival to ED, EKG: NSR at 81 bpm with sinus arrhythmia, LAFB, LVH, ILBBB, QTc 483. CE x2: Trop 39-->40. H/H: 11.0/35.5. Glucose: 261. ProBNP: 809.7. CXR: Clear lungs. No pleural effusions or pneumothorax. Stable sternal wires, and mediastinal surgical clips, and prominent appearing cardiac and mediastinal silhouettes. Trachea midline. Unremarkable osseous structures. Pt was given IV Lasix and admitted to telemetry.

## 2019-03-01 NOTE — H&P ADULT - NEUROLOGICAL DETAILS
alert and oriented x 3/responds to verbal commands/sensation intact/cranial nerves intact/responds to pain/normal strength

## 2019-03-01 NOTE — H&P ADULT - ATTENDING COMMENTS
Patient seen and examined.  Agree with above.   -admitted with dyspnea on exertion, found to have elevated bnp  -iv diuresis to keep O > I  -check tte  -further workup pending above    Renata Harvey MD

## 2019-03-01 NOTE — ED ADULT NURSE NOTE - NSIMPLEMENTINTERV_GEN_ALL_ED
Implemented All Universal Safety Interventions:  [place] to call system. Call bell, personal items and telephone within reach. Instruct patient to call for assistance. Room bathroom lighting operational. Non-slip footwear when patient is off stretcher. Physically safe environment: no spills, clutter or unnecessary equipment. Stretcher in lowest position, wheels locked, appropriate side rails in place.

## 2019-03-01 NOTE — H&P ADULT - PROBLEM SELECTOR PLAN 5
Chief Complaint   Patient presents with     Allied Health Visit     Bp Check       See DARRION Combs 4/10/2017  
Continue Lipitor  Check FLP  DASH diet

## 2019-03-01 NOTE — ED PROVIDER NOTE - CLINICAL SUMMARY MEDICAL DECISION MAKING FREE TEXT BOX
59m w hx CAD s/p CABG, CHF, COPD, DM, HTN sent in by pmd after 3 days RESENDIZ. Appears well w/o signs resp distress. Mild faint bibasilar crackles and 2+ pitting edema. Likely fluid overload w unclear trigger, no obv infectious etiology. Labs, cxr, r/a

## 2019-03-01 NOTE — ED ADULT NURSE NOTE - OBJECTIVE STATEMENT
pt is in bed A and O x3 in NAD, pt reports " my doctor told me that I need to come here because my shortness of breath gets worse". lung sounds clear, pt communicates with full and complete sentences breathing is even and unlabored.  denies fever/chills no cough noted, orders noted and completed.

## 2019-03-01 NOTE — H&P ADULT - PROBLEM SELECTOR PLAN 3
Respiratory status appears stable  No wheezing on exam  Continue to monitor pulse ox  Supplemental oxygen only if needed

## 2019-03-01 NOTE — H&P ADULT - PROBLEM SELECTOR PLAN 1
Pt appears mildly hypervolemic on exam with bibasalar rales and trace bilateral pitting edema  EKG without ischemic changes  Delta troponin negative  Start Lasix 40mg IVP daily  Continue Metoprolol, Lisinopril  Echocardiogram ordered  Strict I&Os, monitor daily weights, 1500 cc fluid restriction, sodium restriction  Defer ischemic workup to primary team  No PT eval given pt independent/ambulatory  F/U MD note

## 2019-03-01 NOTE — H&P ADULT - ASSESSMENT
60 y/o male with a PMHx of CAD S/P CABG, ischemic cardiomyopathy with moderate LV dysfunction, COPD not on home oxygen, HTN, HLD, DM and GERD presents to ED with dyspnea on exertion and lower extremity edema in the setting of acute on chronic systolic heart failure.

## 2019-03-01 NOTE — ED PROVIDER NOTE - ATTENDING CONTRIBUTION TO CARE
[place]: 58yo male with a h/o CAD s/p CABG, CHF, COPD, DM, HTN sent in by his PMD for a CHF exacerbation. Pt endorses 3-4 days of progressively worsening SOB. No associated chest pain, LE pain or edema. No cough, fevers or chills. NO abdominal pain, nausea or vomiting. Exam: GENERAL: well appearing, NAD, HEENT: MMM, PERRLA, CARDIO: +S1/S2, no murmurs, rubs or gallops, LUNGS: + rales in b/l lower lung fields, no tachypnea or accessory muscle use ABD: soft, nontender, BSx4 quadrants, no guarding or rigidity. EXT: No LE edema or calf TTP, 2+ distal pulses x 4 extremities. NEURO: AxOx3, SKIN: no rashes or lesions, well perfused A/P- 58yo male with CHF exacerbation. will obtain cbc, cmp, troponin, CXR, bnp, give IV lasix and reassess.

## 2019-03-01 NOTE — H&P ADULT - PROBLEM SELECTOR PLAN 2
Continue ASA, Plavix, Lipitor, Metoprolol and Lisinopril  EKG without ischemic changes  Delta troponin negative  Echocardiogram ordered  Defer ischemic workup to primary team  DASH diet

## 2019-03-02 LAB
ALBUMIN SERPL ELPH-MCNC: 3.7 G/DL — SIGNIFICANT CHANGE UP (ref 3.3–5)
ALP SERPL-CCNC: 81 U/L — SIGNIFICANT CHANGE UP (ref 40–120)
ALT FLD-CCNC: 32 U/L — SIGNIFICANT CHANGE UP (ref 4–41)
ANION GAP SERPL CALC-SCNC: 11 MMO/L — SIGNIFICANT CHANGE UP (ref 7–14)
AST SERPL-CCNC: 19 U/L — SIGNIFICANT CHANGE UP (ref 4–40)
BILIRUB SERPL-MCNC: 0.6 MG/DL — SIGNIFICANT CHANGE UP (ref 0.2–1.2)
BUN SERPL-MCNC: 15 MG/DL — SIGNIFICANT CHANGE UP (ref 7–23)
CALCIUM SERPL-MCNC: 9.5 MG/DL — SIGNIFICANT CHANGE UP (ref 8.4–10.5)
CHLORIDE SERPL-SCNC: 103 MMOL/L — SIGNIFICANT CHANGE UP (ref 98–107)
CHOLEST SERPL-MCNC: 255 MG/DL — HIGH (ref 120–199)
CO2 SERPL-SCNC: 25 MMOL/L — SIGNIFICANT CHANGE UP (ref 22–31)
CREAT SERPL-MCNC: 0.88 MG/DL — SIGNIFICANT CHANGE UP (ref 0.5–1.3)
GLUCOSE BLDC GLUCOMTR-MCNC: 204 MG/DL — HIGH (ref 70–99)
GLUCOSE BLDC GLUCOMTR-MCNC: 225 MG/DL — HIGH (ref 70–99)
GLUCOSE BLDC GLUCOMTR-MCNC: 234 MG/DL — HIGH (ref 70–99)
GLUCOSE BLDC GLUCOMTR-MCNC: 92 MG/DL — SIGNIFICANT CHANGE UP (ref 70–99)
GLUCOSE SERPL-MCNC: 97 MG/DL — SIGNIFICANT CHANGE UP (ref 70–99)
HBA1C BLD-MCNC: 9.8 % — HIGH (ref 4–5.6)
HCT VFR BLD CALC: 37.4 % — LOW (ref 39–50)
HDLC SERPL-MCNC: 31 MG/DL — LOW (ref 35–55)
HGB BLD-MCNC: 11.2 G/DL — LOW (ref 13–17)
LIPID PNL WITH DIRECT LDL SERPL: 214 MG/DL — SIGNIFICANT CHANGE UP
MAGNESIUM SERPL-MCNC: 1.9 MG/DL — SIGNIFICANT CHANGE UP (ref 1.6–2.6)
MCHC RBC-ENTMCNC: 23.4 PG — LOW (ref 27–34)
MCHC RBC-ENTMCNC: 29.9 % — LOW (ref 32–36)
MCV RBC AUTO: 78.2 FL — LOW (ref 80–100)
NRBC # FLD: 0 K/UL — LOW (ref 25–125)
PHOSPHATE SERPL-MCNC: 4.3 MG/DL — SIGNIFICANT CHANGE UP (ref 2.5–4.5)
PLATELET # BLD AUTO: 382 K/UL — SIGNIFICANT CHANGE UP (ref 150–400)
PMV BLD: 11.2 FL — SIGNIFICANT CHANGE UP (ref 7–13)
POTASSIUM SERPL-MCNC: 4 MMOL/L — SIGNIFICANT CHANGE UP (ref 3.5–5.3)
POTASSIUM SERPL-SCNC: 4 MMOL/L — SIGNIFICANT CHANGE UP (ref 3.5–5.3)
PROT SERPL-MCNC: 6.7 G/DL — SIGNIFICANT CHANGE UP (ref 6–8.3)
RBC # BLD: 4.78 M/UL — SIGNIFICANT CHANGE UP (ref 4.2–5.8)
RBC # FLD: 15.9 % — HIGH (ref 10.3–14.5)
SODIUM SERPL-SCNC: 139 MMOL/L — SIGNIFICANT CHANGE UP (ref 135–145)
TRIGL SERPL-MCNC: 249 MG/DL — HIGH (ref 10–149)
TSH SERPL-MCNC: 3.38 UIU/ML — SIGNIFICANT CHANGE UP (ref 0.27–4.2)
WBC # BLD: 6.68 K/UL — SIGNIFICANT CHANGE UP (ref 3.8–10.5)
WBC # FLD AUTO: 6.68 K/UL — SIGNIFICANT CHANGE UP (ref 3.8–10.5)

## 2019-03-02 RX ADMIN — PREGABALIN 1000 MICROGRAM(S): 225 CAPSULE ORAL at 12:32

## 2019-03-02 RX ADMIN — Medication 40 MILLIGRAM(S): at 12:33

## 2019-03-02 RX ADMIN — ATORVASTATIN CALCIUM 80 MILLIGRAM(S): 80 TABLET, FILM COATED ORAL at 22:30

## 2019-03-02 RX ADMIN — Medication 2: at 18:11

## 2019-03-02 RX ADMIN — INSULIN GLARGINE 40 UNIT(S): 100 INJECTION, SOLUTION SUBCUTANEOUS at 22:30

## 2019-03-02 RX ADMIN — HEPARIN SODIUM 5000 UNIT(S): 5000 INJECTION INTRAVENOUS; SUBCUTANEOUS at 22:30

## 2019-03-02 RX ADMIN — INSULIN GLARGINE 40 UNIT(S): 100 INJECTION, SOLUTION SUBCUTANEOUS at 09:35

## 2019-03-02 RX ADMIN — Medication 20 UNIT(S): at 18:11

## 2019-03-02 RX ADMIN — PANTOPRAZOLE SODIUM 40 MILLIGRAM(S): 20 TABLET, DELAYED RELEASE ORAL at 09:27

## 2019-03-02 RX ADMIN — CLOPIDOGREL BISULFATE 75 MILLIGRAM(S): 75 TABLET, FILM COATED ORAL at 12:32

## 2019-03-02 RX ADMIN — Medication 20 UNIT(S): at 12:46

## 2019-03-02 RX ADMIN — HEPARIN SODIUM 5000 UNIT(S): 5000 INJECTION INTRAVENOUS; SUBCUTANEOUS at 12:33

## 2019-03-02 RX ADMIN — HEPARIN SODIUM 5000 UNIT(S): 5000 INJECTION INTRAVENOUS; SUBCUTANEOUS at 06:31

## 2019-03-02 RX ADMIN — Medication 2: at 12:46

## 2019-03-02 RX ADMIN — Medication 25 MILLIGRAM(S): at 17:05

## 2019-03-02 RX ADMIN — Medication 81 MILLIGRAM(S): at 12:32

## 2019-03-02 RX ADMIN — Medication 20 UNIT(S): at 09:26

## 2019-03-02 NOTE — PROGRESS NOTE ADULT - SUBJECTIVE AND OBJECTIVE BOX
Subjective:  pt seen and examined, no complaints, ROS - .     aspirin enteric coated 81 milliGRAM(s) Oral daily  atorvastatin 80 milliGRAM(s) Oral at bedtime  clopidogrel Tablet 75 milliGRAM(s) Oral daily  cyanocobalamin 1000 MICROGram(s) Oral daily  dextrose 40% Gel 15 Gram(s) Oral once PRN  dextrose 5%. 1000 milliLiter(s) IV Continuous <Continuous>  dextrose 50% Injectable 12.5 Gram(s) IV Push once  dextrose 50% Injectable 25 Gram(s) IV Push once  dextrose 50% Injectable 25 Gram(s) IV Push once  ergocalciferol 64093 Unit(s) Oral <User Schedule>  furosemide   Injectable 40 milliGRAM(s) IV Push daily  glucagon  Injectable 1 milliGRAM(s) IntraMuscular once PRN  heparin  Injectable 5000 Unit(s) SubCutaneous every 8 hours  insulin glargine Injectable (LANTUS) 40 Unit(s) SubCutaneous every morning  insulin glargine Injectable (LANTUS) 40 Unit(s) SubCutaneous at bedtime  insulin lispro (HumaLOG) corrective regimen sliding scale   SubCutaneous three times a day before meals  insulin lispro (HumaLOG) corrective regimen sliding scale   SubCutaneous at bedtime  insulin lispro Injectable (HumaLOG) 20 Unit(s) SubCutaneous three times a day before meals  lisinopril 2.5 milliGRAM(s) Oral daily  metoprolol tartrate 25 milliGRAM(s) Oral two times a day  pantoprazole    Tablet 40 milliGRAM(s) Oral before breakfast                            11.0   8.54  )-----------( 352      ( 01 Mar 2019 13:56 )             35.5       Hemoglobin: 11.0 g/dL (03-01 @ 13:56)      03-01    137  |  103  |  15  ----------------------------<  261<H>  4.5   |  22  |  0.85    Ca    9.5      01 Mar 2019 13:56    TPro  6.9  /  Alb  4.0  /  TBili  0.4  /  DBili  x   /  AST  26  /  ALT  36  /  AlkPhos  94  03-01    Creatinine Trend: 0.85<--    COAGS: PT/INR - ( 01 Mar 2019 13:56 )   PT: 11.9 SEC;   INR: 1.04          PTT - ( 01 Mar 2019 13:56 )  PTT:31.7 SEC          T(C): 36.8 (03-02-19 @ 06:29), Max: 37 (03-01-19 @ 12:40)  HR: 77 (03-02-19 @ 06:29) (74 - 92)  BP: 99/62 (03-02-19 @ 06:29) (99/62 - 179/80)  RR: 18 (03-02-19 @ 06:29) (16 - 18)  SpO2: 100% (03-02-19 @ 06:29) (97% - 100%)  Wt(kg): --    I&O's Summary      Appearance: Normal	  HEENT:   Normal oral mucosa, PERRL, EOMI	  Lymphatic: No lymphadenopathy , ++ edema pitting   Cardiovascular: Normal S1 S2, No JVD, No murmurs , Peripheral pulses palpable 2+ bilaterally  Respiratory: Lungs clear to auscultation, normal effort 	  Gastrointestinal:  Soft, Non-tender, + BS	  Skin: No rashes, No ecchymoses, No cyanosis, warm to touch  Musculoskeletal: Normal range of motion, normal strength  Psychiatry:  Mood & affect appropriate    TELEMETRY: 	NSR   ECG:  	  RADIOLOGY:   DIAGNOSTIC TESTING:  [ ] Echocardiogram: < from: TTE with Doppler (w/Cont) (10.01.18 @ 16:08) >  CONCLUSIONS:  Technically difficult study.  1. Mitral annular calcification, otherwise normal mitral  valve. Mild mitral regurgitation.  2. Endocardium not well visualized; grossly moderate global  left ventricular systolic dysfunction.  Endocardial  visualization enhanced with intravenous injection of echo  contrast (Definity).  3. Unable to accurately evaluate right ventricular size or  systolic function.  -------------------------------------------    < end of copied text >    [ ]  Catheterization:  [ ] Stress Test:    OTHER: 	        ASSESSMENT/PLAN:  58 y/o male with a PMHx of CAD S/P CABG, ischemic cardiomyopathy with moderate LV dysfunction, COPD not on home oxygen, HTN, HLD, DM and GERD presents to ED with dyspnea on exertion and lower extremity edema in the setting of acute on chronic systolic heart failure.    Aggressive Diuresis   tele stable    GI / DVT prophylaxis.   keep K>4, mag >2.0   DAPT for stent / CABG  Tolerating BB / ACE at present   repeat echo pending   D/W Dr Donaldson

## 2019-03-02 NOTE — DIETITIAN INITIAL EVALUATION ADULT. - NS AS NUTRI INTERV ED CONTENT
RDN provided verbal ( pt. declined written material at this time) DM diet education; including, carb counting, label reading, meal planning, pre-prandial and post-prandial finger stick goals, and HbA1c goal. Patient was also made aware of the physiological implications of poor glycemic control. The Patient was provided with Heart Healthy diet education (low sodium, low cholesterol, "good fats" vs "bad fats," fiber, label reading, meal planning, and daily weight monitoring).

## 2019-03-02 NOTE — DIETITIAN INITIAL EVALUATION ADULT. - ENERGY NEEDS
Ht: 66 inches Wt: 199.9 pounds  BMI: 32.1 kg/m2 IBW: 142 pounds (+/-10%) %IBW: 140%     Edema: + 2 edema- left and right leg. Skin: intact, no pressure injuries noted

## 2019-03-02 NOTE — PROGRESS NOTE ADULT - ATTENDING COMMENTS
Patient seen and examined, agree with above assessment and plan as transcribed above.    - Echo  - plan for ischemic eval pending above    Cal Donaldson MD, [facility]  BEEPER [phone]

## 2019-03-02 NOTE — DIETITIAN INITIAL EVALUATION ADULT. - ADHERENCE
No diet restrictions followed. Monitors BGs once a day in the AM, usual BG ranges 120-130 mg/dL. 3/2 Hba1c: 9.8%. Patient unable to provide food sources that affect blood sugar.

## 2019-03-02 NOTE — DIETITIAN INITIAL EVALUATION ADULT. - OTHER INFO
Patient seen for nutrition consult- Registered Dietitian. Per chart: Patient with history of CAD s/p CABG, ischemic cardiomyopathy with moderate LV dysfunction, COPD (not on home oxygen), HTN, HLD, DM, GERD- presents with dyspnea on exertion and lower extremity edema in the setting of acute on chronic systolic heart failure. Patient reported he is eating well- 100% of meals. Patient denies any nausea/vomiting/diarrhea/constipation or difficulty chewing and swallowing. Patient reports no food allergies or intolerances. Denies any recent weight change. Reported usual weight is around 190 pounds. Current weight: 199.9 pounds. Patient noted with + 2 edema- left and right leg.

## 2019-03-02 NOTE — DIETITIAN INITIAL EVALUATION ADULT. - PERTINENT MEDS FT
MEDICATIONS  (STANDING):  aspirin enteric coated 81 milliGRAM(s) Oral daily  atorvastatin 80 milliGRAM(s) Oral at bedtime  clopidogrel Tablet 75 milliGRAM(s) Oral daily  cyanocobalamin 1000 MICROGram(s) Oral daily  dextrose 5%. 1000 milliLiter(s) (50 mL/Hr) IV Continuous <Continuous>  dextrose 50% Injectable 12.5 Gram(s) IV Push once  dextrose 50% Injectable 25 Gram(s) IV Push once  dextrose 50% Injectable 25 Gram(s) IV Push once  ergocalciferol 95647 Unit(s) Oral <User Schedule>  furosemide   Injectable 40 milliGRAM(s) IV Push daily  heparin  Injectable 5000 Unit(s) SubCutaneous every 8 hours  insulin glargine Injectable (LANTUS) 40 Unit(s) SubCutaneous every morning  insulin glargine Injectable (LANTUS) 40 Unit(s) SubCutaneous at bedtime  insulin lispro (HumaLOG) corrective regimen sliding scale   SubCutaneous three times a day before meals  insulin lispro (HumaLOG) corrective regimen sliding scale   SubCutaneous at bedtime  insulin lispro Injectable (HumaLOG) 20 Unit(s) SubCutaneous three times a day before meals  lisinopril 2.5 milliGRAM(s) Oral daily  metoprolol tartrate 25 milliGRAM(s) Oral two times a day  pantoprazole    Tablet 40 milliGRAM(s) Oral before breakfast    MEDICATIONS  (PRN):  dextrose 40% Gel 15 Gram(s) Oral once PRN Blood Glucose LESS THAN 70 milliGRAM(s)/deciliter  glucagon  Injectable 1 milliGRAM(s) IntraMuscular once PRN Glucose LESS THAN 70 milligrams/deciliter

## 2019-03-02 NOTE — DIETITIAN INITIAL EVALUATION ADULT. - PERTINENT LABORATORY DATA
03-02 Na139 mmol/L Glu 97 mg/dL K+ 4.0 mmol/L Cr  0.88 mg/dL BUN 15 mg/dL 03-02 Phos 4.3 mg/dL 03-02 Alb 3.7 g/dL 03-02 [id number] 9.8 %<H> 03-02 Chol 255 mg/dL<H>  mg/dL HDL 31 mg/dL<L> Trig 249 mg/dL<H>. POCT: 92, 102, 144, 245 mg/dL

## 2019-03-02 NOTE — DIETITIAN INITIAL EVALUATION ADULT. - ORAL INTAKE PTA
Patient reported having a good appetite, consuming 3 meals a day. Follows a vegetarian diet- avoids: meat and eggs, accepts: dairy./good

## 2019-03-03 LAB
ANION GAP SERPL CALC-SCNC: 13 MMO/L — SIGNIFICANT CHANGE UP (ref 7–14)
BUN SERPL-MCNC: 16 MG/DL — SIGNIFICANT CHANGE UP (ref 7–23)
CALCIUM SERPL-MCNC: 9.4 MG/DL — SIGNIFICANT CHANGE UP (ref 8.4–10.5)
CHLORIDE SERPL-SCNC: 103 MMOL/L — SIGNIFICANT CHANGE UP (ref 98–107)
CO2 SERPL-SCNC: 24 MMOL/L — SIGNIFICANT CHANGE UP (ref 22–31)
CREAT SERPL-MCNC: 0.87 MG/DL — SIGNIFICANT CHANGE UP (ref 0.5–1.3)
GLUCOSE BLDC GLUCOMTR-MCNC: 149 MG/DL — HIGH (ref 70–99)
GLUCOSE BLDC GLUCOMTR-MCNC: 164 MG/DL — HIGH (ref 70–99)
GLUCOSE BLDC GLUCOMTR-MCNC: 201 MG/DL — HIGH (ref 70–99)
GLUCOSE BLDC GLUCOMTR-MCNC: 220 MG/DL — HIGH (ref 70–99)
GLUCOSE SERPL-MCNC: 176 MG/DL — HIGH (ref 70–99)
HCT VFR BLD CALC: 37.2 % — LOW (ref 39–50)
HGB BLD-MCNC: 11.2 G/DL — LOW (ref 13–17)
MAGNESIUM SERPL-MCNC: 1.8 MG/DL — SIGNIFICANT CHANGE UP (ref 1.6–2.6)
MCHC RBC-ENTMCNC: 23.3 PG — LOW (ref 27–34)
MCHC RBC-ENTMCNC: 30.1 % — LOW (ref 32–36)
MCV RBC AUTO: 77.5 FL — LOW (ref 80–100)
NRBC # FLD: 0 K/UL — LOW (ref 25–125)
NT-PROBNP SERPL-SCNC: 366.9 PG/ML — SIGNIFICANT CHANGE UP
PHOSPHATE SERPL-MCNC: 4.3 MG/DL — SIGNIFICANT CHANGE UP (ref 2.5–4.5)
PLATELET # BLD AUTO: 356 K/UL — SIGNIFICANT CHANGE UP (ref 150–400)
PMV BLD: 10.9 FL — SIGNIFICANT CHANGE UP (ref 7–13)
POTASSIUM SERPL-MCNC: 4.3 MMOL/L — SIGNIFICANT CHANGE UP (ref 3.5–5.3)
POTASSIUM SERPL-SCNC: 4.3 MMOL/L — SIGNIFICANT CHANGE UP (ref 3.5–5.3)
RBC # BLD: 4.8 M/UL — SIGNIFICANT CHANGE UP (ref 4.2–5.8)
RBC # FLD: 15.7 % — HIGH (ref 10.3–14.5)
SODIUM SERPL-SCNC: 140 MMOL/L — SIGNIFICANT CHANGE UP (ref 135–145)
WBC # BLD: 6.57 K/UL — SIGNIFICANT CHANGE UP (ref 3.8–10.5)
WBC # FLD AUTO: 6.57 K/UL — SIGNIFICANT CHANGE UP (ref 3.8–10.5)

## 2019-03-03 RX ORDER — FUROSEMIDE 40 MG
40 TABLET ORAL DAILY
Qty: 0 | Refills: 0 | Status: DISCONTINUED | OUTPATIENT
Start: 2019-03-04 | End: 2019-03-04

## 2019-03-03 RX ADMIN — INSULIN GLARGINE 40 UNIT(S): 100 INJECTION, SOLUTION SUBCUTANEOUS at 22:31

## 2019-03-03 RX ADMIN — Medication 40 MILLIGRAM(S): at 12:13

## 2019-03-03 RX ADMIN — Medication 20 UNIT(S): at 09:23

## 2019-03-03 RX ADMIN — HEPARIN SODIUM 5000 UNIT(S): 5000 INJECTION INTRAVENOUS; SUBCUTANEOUS at 12:13

## 2019-03-03 RX ADMIN — Medication 2: at 18:21

## 2019-03-03 RX ADMIN — LISINOPRIL 2.5 MILLIGRAM(S): 2.5 TABLET ORAL at 05:19

## 2019-03-03 RX ADMIN — PREGABALIN 1000 MICROGRAM(S): 225 CAPSULE ORAL at 12:12

## 2019-03-03 RX ADMIN — HEPARIN SODIUM 5000 UNIT(S): 5000 INJECTION INTRAVENOUS; SUBCUTANEOUS at 05:19

## 2019-03-03 RX ADMIN — Medication 25 MILLIGRAM(S): at 05:19

## 2019-03-03 RX ADMIN — ATORVASTATIN CALCIUM 80 MILLIGRAM(S): 80 TABLET, FILM COATED ORAL at 22:31

## 2019-03-03 RX ADMIN — Medication 20 UNIT(S): at 18:21

## 2019-03-03 RX ADMIN — Medication 2: at 13:28

## 2019-03-03 RX ADMIN — Medication 20 UNIT(S): at 13:27

## 2019-03-03 RX ADMIN — PANTOPRAZOLE SODIUM 40 MILLIGRAM(S): 20 TABLET, DELAYED RELEASE ORAL at 05:19

## 2019-03-03 RX ADMIN — CLOPIDOGREL BISULFATE 75 MILLIGRAM(S): 75 TABLET, FILM COATED ORAL at 12:12

## 2019-03-03 RX ADMIN — INSULIN GLARGINE 40 UNIT(S): 100 INJECTION, SOLUTION SUBCUTANEOUS at 09:23

## 2019-03-03 RX ADMIN — Medication 81 MILLIGRAM(S): at 12:12

## 2019-03-03 NOTE — PROGRESS NOTE ADULT - SUBJECTIVE AND OBJECTIVE BOX
SUBJECTIVE: No CP or SOB    MEDICATIONS  (STANDING):  aspirin enteric coated 81 milliGRAM(s) Oral daily  atorvastatin 80 milliGRAM(s) Oral at bedtime  clopidogrel Tablet 75 milliGRAM(s) Oral daily  cyanocobalamin 1000 MICROGram(s) Oral daily  dextrose 5%. 1000 milliLiter(s) (50 mL/Hr) IV Continuous <Continuous>  dextrose 50% Injectable 12.5 Gram(s) IV Push once  dextrose 50% Injectable 25 Gram(s) IV Push once  dextrose 50% Injectable 25 Gram(s) IV Push once  ergocalciferol 89851 Unit(s) Oral <User Schedule>  furosemide   Injectable 40 milliGRAM(s) IV Push daily  heparin  Injectable 5000 Unit(s) SubCutaneous every 8 hours  insulin glargine Injectable (LANTUS) 40 Unit(s) SubCutaneous every morning  insulin glargine Injectable (LANTUS) 40 Unit(s) SubCutaneous at bedtime  insulin lispro (HumaLOG) corrective regimen sliding scale   SubCutaneous three times a day before meals  insulin lispro (HumaLOG) corrective regimen sliding scale   SubCutaneous at bedtime  insulin lispro Injectable (HumaLOG) 20 Unit(s) SubCutaneous three times a day before meals  lisinopril 2.5 milliGRAM(s) Oral daily  metoprolol tartrate 25 milliGRAM(s) Oral two times a day  pantoprazole    Tablet 40 milliGRAM(s) Oral before breakfast    MEDICATIONS  (PRN):  dextrose 40% Gel 15 Gram(s) Oral once PRN Blood Glucose LESS THAN 70 milliGRAM(s)/deciliter  glucagon  Injectable 1 milliGRAM(s) IntraMuscular once PRN Glucose LESS THAN 70 milligrams/deciliter      LABS:                        11.2   6.57  )-----------( 356      ( 03 Mar 2019 05:20 )             37.2     140  |  103  |  16  ----------------------------<  176<H>  4.3   |  24  |  0.87    Ca    9.4      03 Mar 2019 06:00  Phos  4.3     03-03  Mg     1.8     03-03    TPro  6.7  /  Alb  3.7  /  TBili  0.6  /  DBili  x   /  AST  19  /  ALT  32  /  AlkPhos  81  03-02    Serum Pro-Brain Natriuretic Peptide: 366.9 pg/mL (03-03 @ 06:00)  Serum Pro-Brain Natriuretic Peptide: 809.7 pg/mL (03-01 @ 13:56)    PHYSICAL EXAM:  Vital Signs Last 24 Hrs  T(C): 36.7 (03 Mar 2019 05:09), Max: 36.8 (02 Mar 2019 14:34)  T(F): 98.1 (03 Mar 2019 05:09), Max: 98.2 (02 Mar 2019 14:34)  HR: 70 (03 Mar 2019 05:09) (70 - 75)  BP: 153/84 (03 Mar 2019 05:09) (121/64 - 153/84)  RR: 17 (03 Mar 2019 05:09) (16 - 18)  SpO2: 98% (03 Mar 2019 05:09) (98% - 100%)    Cardiovascular:  S1S2 RRR, No JVD  Respiratory: Lungs clear to auscultation, normal effort  Gastrointestinal: Abdomen soft, ND, NT, +BS  Skin: Warm, dry, intact. No rash.  Musculoskeletal: Normal ROM, normal strength  Ext: No C/C/E B/L LE    DIAGNOSTIC DATA  TELEMETRY: SR    < from: TTE with Doppler (w/Cont) (10.01.18 @ 16:08) >  CONCLUSIONS:  Technically difficult study.  1. Mitral annular calcification, otherwise normal mitral  valve. Mild mitral regurgitation.  2. Endocardium not well visualized; grossly moderate global  left ventricular systolic dysfunction.  Endocardial  visualization enhanced with intravenous injection of echo  contrast (Definity).  3. Unable to accurately evaluate right ventricular size or  systolic function.  ------------------------------------------------------------------------  Confirmed on  10/1/2018 - 18:07:01 by Guanako Foley M.D.    < end of copied text >      < from: Cardiac Cath Lab - Adult (10.29.18 @ 10:37) >  VENTRICLES: No LV gram was performed; however, a recent echocardiogram  demonstrated an EF of 40 %.  CORONARY VESSELS: The coronary circulation is right dominant.  LM:   --  LM: Normal.  LAD:   --  Proximal LAD: There was a 100 % stenosis.  --  Mid LAD: The artery was supplied by a patent bypass graft.  --  Distal LAD: Angiography showed mild to moderate atherosclerosis with no  flow limiting lesions. There was a discrete 60 % stenosis.  CX:   --  Proximal circumflex: Angiography showed minor luminal  irregularities with no flow limiting lesions.  The stent in the proximal circumflex is patent.  --  Distal circumflex: Angiography showed minor luminal irregularities with  no flow limiting lesions.  --  OM1: Angiography showed mild atherosclerosis with no flow limiting  lesions. There was a tubular 30 % stenosis.  --  OM2: The artery was supplied by collaterals. There was a 100 %  stenosis.  RCA:   --  Proximal RCA: There was a 100 % stenosis.  --  RPDA: The artery was supplied by a diseased bypass graft.  GRAFTS:   --  Graft to the mid LAD: The graft was a LIMA. Graft angiography  showed minor luminal irregularities.  --  Graft to the 1st diagonal: The graft was a saphenous vein graft from  the aorta. It was occluded.  --  Graft to the 2nd obtuse marginal: The graft was a saphenous vein graft  from the aorta. It was occluded.  --  Graft to the distal RCA: The graft was a PREETI. It was poorly  visualized. Graft angiography showed minor luminal irregularities.  COMPLICATIONS: There were no complications.  DIAGNOSTIC RECOMMENDATIONS: will need to optimize anti-anginal therapy.  Prepared and signed by  Xochitl Bailey M.D.  Signed 10/29/2018 15:36:22    < end of copied text >    ASSESSMENT AND PLAN:    59y Male with a history of hypertension, hypercholesterolemia, diabetes, transient palpitations, coronary artery disease, status post MI in November 2005 with four-vessel bypass in 2005 at [facility], s/p PCI to LCX in 2012 at [facility] (at that time LIMA-LAD patent, PREETI- RPDA patent, but SVG-D1 and SVG-OM occluded), with ischemic CM, Ef 40%, recent cath noted above managed medically, admitted with acute on chronic decomp systolic CHF    --diuresing well, CHF compensated  --change to PO Lasix tomorrow  --Repeat TTE pending  --Repeat ischemic eval pending above

## 2019-03-03 NOTE — PROGRESS NOTE ADULT - ATTENDING COMMENTS
Patient seen and examined, agree with above assessment and plan as transcribed above.      - f/u echo  - Will review prior cath films with Dr. Lynn Donaldson MD, [facility]  BEEPER [phone]

## 2019-03-04 ENCOUNTER — TRANSCRIPTION ENCOUNTER (OUTPATIENT)
Age: 59
End: 2019-03-04

## 2019-03-04 VITALS
TEMPERATURE: 99 F | WEIGHT: 201.5 LBS | SYSTOLIC BLOOD PRESSURE: 143 MMHG | OXYGEN SATURATION: 100 % | RESPIRATION RATE: 17 BRPM | DIASTOLIC BLOOD PRESSURE: 66 MMHG | HEART RATE: 73 BPM

## 2019-03-04 LAB
ANION GAP SERPL CALC-SCNC: 13 MMO/L — SIGNIFICANT CHANGE UP (ref 7–14)
ANION GAP SERPL CALC-SCNC: 13 MMO/L — SIGNIFICANT CHANGE UP (ref 7–14)
BUN SERPL-MCNC: 25 MG/DL — HIGH (ref 7–23)
BUN SERPL-MCNC: 25 MG/DL — HIGH (ref 7–23)
CALCIUM SERPL-MCNC: 9.3 MG/DL — SIGNIFICANT CHANGE UP (ref 8.4–10.5)
CALCIUM SERPL-MCNC: 9.3 MG/DL — SIGNIFICANT CHANGE UP (ref 8.4–10.5)
CHLORIDE SERPL-SCNC: 102 MMOL/L — SIGNIFICANT CHANGE UP (ref 98–107)
CHLORIDE SERPL-SCNC: 102 MMOL/L — SIGNIFICANT CHANGE UP (ref 98–107)
CO2 SERPL-SCNC: 22 MMOL/L — SIGNIFICANT CHANGE UP (ref 22–31)
CO2 SERPL-SCNC: 22 MMOL/L — SIGNIFICANT CHANGE UP (ref 22–31)
CREAT SERPL-MCNC: 1.17 MG/DL — SIGNIFICANT CHANGE UP (ref 0.5–1.3)
CREAT SERPL-MCNC: 1.17 MG/DL — SIGNIFICANT CHANGE UP (ref 0.5–1.3)
GLUCOSE BLDC GLUCOMTR-MCNC: 169 MG/DL — HIGH (ref 70–99)
GLUCOSE BLDC GLUCOMTR-MCNC: 183 MG/DL — HIGH (ref 70–99)
GLUCOSE SERPL-MCNC: 190 MG/DL — HIGH (ref 70–99)
GLUCOSE SERPL-MCNC: 190 MG/DL — HIGH (ref 70–99)
HCT VFR BLD CALC: 37.5 % — LOW (ref 39–50)
HGB BLD-MCNC: 11 G/DL — LOW (ref 13–17)
MAGNESIUM SERPL-MCNC: 1.9 MG/DL — SIGNIFICANT CHANGE UP (ref 1.6–2.6)
MCHC RBC-ENTMCNC: 23.4 PG — LOW (ref 27–34)
MCHC RBC-ENTMCNC: 29.3 % — LOW (ref 32–36)
MCV RBC AUTO: 79.8 FL — LOW (ref 80–100)
NRBC # FLD: 0 K/UL — LOW (ref 25–125)
PHOSPHATE SERPL-MCNC: 4.1 MG/DL — SIGNIFICANT CHANGE UP (ref 2.5–4.5)
PLATELET # BLD AUTO: 348 K/UL — SIGNIFICANT CHANGE UP (ref 150–400)
PMV BLD: 10.8 FL — SIGNIFICANT CHANGE UP (ref 7–13)
POTASSIUM SERPL-MCNC: 4.6 MMOL/L — SIGNIFICANT CHANGE UP (ref 3.5–5.3)
POTASSIUM SERPL-MCNC: 4.6 MMOL/L — SIGNIFICANT CHANGE UP (ref 3.5–5.3)
POTASSIUM SERPL-SCNC: 4.6 MMOL/L — SIGNIFICANT CHANGE UP (ref 3.5–5.3)
POTASSIUM SERPL-SCNC: 4.6 MMOL/L — SIGNIFICANT CHANGE UP (ref 3.5–5.3)
RBC # BLD: 4.7 M/UL — SIGNIFICANT CHANGE UP (ref 4.2–5.8)
RBC # FLD: 15.6 % — HIGH (ref 10.3–14.5)
SODIUM SERPL-SCNC: 137 MMOL/L — SIGNIFICANT CHANGE UP (ref 135–145)
SODIUM SERPL-SCNC: 137 MMOL/L — SIGNIFICANT CHANGE UP (ref 135–145)
WBC # BLD: 6.21 K/UL — SIGNIFICANT CHANGE UP (ref 3.8–10.5)
WBC # FLD AUTO: 6.21 K/UL — SIGNIFICANT CHANGE UP (ref 3.8–10.5)

## 2019-03-04 RX ADMIN — Medication 1: at 09:02

## 2019-03-04 RX ADMIN — Medication 40 MILLIGRAM(S): at 05:05

## 2019-03-04 RX ADMIN — PREGABALIN 1000 MICROGRAM(S): 225 CAPSULE ORAL at 11:40

## 2019-03-04 RX ADMIN — INSULIN GLARGINE 40 UNIT(S): 100 INJECTION, SOLUTION SUBCUTANEOUS at 09:02

## 2019-03-04 RX ADMIN — PANTOPRAZOLE SODIUM 40 MILLIGRAM(S): 20 TABLET, DELAYED RELEASE ORAL at 05:05

## 2019-03-04 RX ADMIN — LISINOPRIL 2.5 MILLIGRAM(S): 2.5 TABLET ORAL at 05:05

## 2019-03-04 RX ADMIN — Medication 20 UNIT(S): at 13:24

## 2019-03-04 RX ADMIN — Medication 81 MILLIGRAM(S): at 11:41

## 2019-03-04 RX ADMIN — ERGOCALCIFEROL 50000 UNIT(S): 1.25 CAPSULE ORAL at 05:05

## 2019-03-04 RX ADMIN — Medication 20 UNIT(S): at 09:02

## 2019-03-04 RX ADMIN — HEPARIN SODIUM 5000 UNIT(S): 5000 INJECTION INTRAVENOUS; SUBCUTANEOUS at 05:05

## 2019-03-04 RX ADMIN — Medication 1: at 13:23

## 2019-03-04 RX ADMIN — CLOPIDOGREL BISULFATE 75 MILLIGRAM(S): 75 TABLET, FILM COATED ORAL at 11:41

## 2019-03-04 RX ADMIN — Medication 25 MILLIGRAM(S): at 05:05

## 2019-03-04 NOTE — DISCHARGE NOTE ADULT - HOSPITAL COURSE
60 y/o male with a PMHx of CAD S/P CABG, ischemic cardiomyopathy with moderate LV dysfunction, COPD not on home oxygen, HTN, HLD, DM and GERD presents to ED with dyspnea on exertion. Pt reports that he has been feeling mildly short of breath on exertion for the past 3-4 days. Pt says that his symptoms are very mild and he insists that he is "okay." Pt also reports mild swelling in his legs bilaterally. Pt admits to being compliant with his medications and his dietary restrictions. Pt saw his primary care physician today for follow up and was told that he "had water in his lungs;" pt was sent to the ED for further workup. Pt denies fever, chills, recent travel, sick contacts, headache, dizziness, visual deficits, chest pain, shortness of breath at rest, palpitations, abdominal pain, N/V/D/C, hematochezia, melena, dysuria, hematuria, LOC, syncope.     Hospital course:  Upon arrival to ED, EKG: NSR at 81 bpm with sinus arrhythmia, LAFB, LVH, ILBBB, QTc 483. CE x2: Trop 39-->40. H/H: 11.0/35.5. Glucose: 261. ProBNP: 809.7. CXR: Clear lungs. No pleural effusions or pneumothorax. Stable sternal wires, and mediastinal surgical clips, and prominent appearing cardiac and mediastinal silhouettes. Trachea midline. Unremarkable osseous structures. Pt was given IV Lasix and admitted to 60 y/o male with a PMHx of CAD S/P CABG, ischemic cardiomyopathy with moderate LV dysfunction, COPD not on home oxygen, HTN, HLD, DM and GERD presents to ED with dyspnea on exertion. Pt reports that he has been feeling mildly short of breath on exertion for the past 3-4 days. Pt says that his symptoms are very mild and he insists that he is "okay." Pt also reports mild swelling in his legs bilaterally. Pt admits to being compliant with his medications and his dietary restrictions. Pt saw his primary care physician today for follow up and was told that he "had water in his lungs;" pt was sent to the ED for further workup. Pt denies fever, chills, recent travel, sick contacts, headache, dizziness, visual deficits, chest pain, shortness of breath at rest, palpitations, abdominal pain, N/V/D/C, hematochezia, melena, dysuria, hematuria, LOC, syncope.     Hospital course:  Upon arrival to ED, EKG: NSR at 81 bpm with sinus arrhythmia, LAFB, LVH, ILBBB, QTc 483. CE x2: Trop 39-->40. H/H: 11.0/35.5. Glucose: 261. ProBNP: 809.7. CXR: Clear lungs. No pleural effusions or pneumothorax. Stable sternal wires, and mediastinal surgical clips, and prominent appearing cardiac and mediastinal silhouettes. Trachea midline. Unremarkable osseous structures. Pt was given IV Lasix with improvement. Pt with acute on chronic systolic HF in setting of increased salt intake. Pt instructed to decrease salt intake. TTE to be done outpatient. Pt with improvement and cleared for discharge on 3/4 by Dr Bailey.

## 2019-03-04 NOTE — DISCHARGE NOTE ADULT - PLAN OF CARE
To relieve and prevent worsening symptoms associated with congestive heart failure, to improve quality of life, and to treat underlying conditions such as coronary heart disease, high blood pressure, or diabetes, and to maintain euvolemia. You have heart failure in setting of increase salt intake. continue lasix and decrease salt intake. fluid restriction 1500 ml daily, monitor your fluid intake and weight daily, exercise as tolerated 30 minutes daily, and follow up with Followup with Dr Bailey in 1 week To be asymptomatic, to reduce risks factors such as hypertension, diabetes and hyperlipidemia to lower the risk of blood clots formation; and to prevent complications of coronary artery disease such as worsening chest pain, heart attack and death. Continue aspirin and Plavix, do not stop unless instructed by your physician.  Continue low salt, fat, cholesterol and carbohydrate diet. Follow up with cardiologist and primary care physician's routine appointment. To maintain an acceptable blood glucose level and to prevent diabetic complications such as uncontrolled diabetes, diabetic coma, blindness, renal failure, and amputations. Hypoglycemia management: please check your fingerstick every morning or if you are not feeling well. If your FS is >300mg/dl X3 or more readings please contact your PMD/Endocrinologist. If your FS is low <70mg/dl and/or you have symptoms of very low blood sugar FIRST drink1/2 cup of apple juice (or take 4 glucose tab/tube of glucose gel) and recheck FS in 15mins. Repeat these steps until blood sugar is above 100mg/dl, if NECESSARY. Then call your provider to discuss low blood sugar.   What to expend at followup appointment: please bring a log of your fingerstick and/or your glucometer to you appointment. Your blood sugar tracking will help your diabetes team determine the best plan To maintain a normal blood pressure to prevent heart attack, stroke and renal failure. Low sodium and fat diet, continue anti-hypertensive medications, and follow up with primary care physician. To maintain normal cholesterol levels to prevent stroke, coronary artery disease, peripheral vascular disease and heart attacks. Low fat diet, exercise daily and continue current medications. Follow up with primary care physician and cardiologist for management. You have heart failure in setting of increase salt intake. continue lasix and decrease salt intake. fluid restriction 1500 ml daily, monitor your fluid intake and weight daily, exercise as tolerated 30 minutes daily, and follow up with Followup with Dr Bailey on 3/8 11:30am

## 2019-03-04 NOTE — PROGRESS NOTE ADULT - SUBJECTIVE AND OBJECTIVE BOX
SUBJECTIVE: No CP or SOB      MEDICATIONS  (STANDING):  aspirin enteric coated 81 milliGRAM(s) Oral daily  atorvastatin 80 milliGRAM(s) Oral at bedtime  clopidogrel Tablet 75 milliGRAM(s) Oral daily  cyanocobalamin 1000 MICROGram(s) Oral daily  dextrose 5%. 1000 milliLiter(s) (50 mL/Hr) IV Continuous <Continuous>  dextrose 50% Injectable 12.5 Gram(s) IV Push once  dextrose 50% Injectable 25 Gram(s) IV Push once  dextrose 50% Injectable 25 Gram(s) IV Push once  ergocalciferol 01133 Unit(s) Oral <User Schedule>  furosemide    Tablet 40 milliGRAM(s) Oral daily  heparin  Injectable 5000 Unit(s) SubCutaneous every 8 hours  insulin glargine Injectable (LANTUS) 40 Unit(s) SubCutaneous every morning  insulin glargine Injectable (LANTUS) 40 Unit(s) SubCutaneous at bedtime  insulin lispro (HumaLOG) corrective regimen sliding scale   SubCutaneous three times a day before meals  insulin lispro (HumaLOG) corrective regimen sliding scale   SubCutaneous at bedtime  insulin lispro Injectable (HumaLOG) 20 Unit(s) SubCutaneous three times a day before meals  lisinopril 2.5 milliGRAM(s) Oral daily  metoprolol tartrate 25 milliGRAM(s) Oral two times a day  pantoprazole    Tablet 40 milliGRAM(s) Oral before breakfast    MEDICATIONS  (PRN):  dextrose 40% Gel 15 Gram(s) Oral once PRN Blood Glucose LESS THAN 70 milliGRAM(s)/deciliter  glucagon  Injectable 1 milliGRAM(s) IntraMuscular once PRN Glucose LESS THAN 70 milligrams/deciliter      LABS:                        11.0   6.21  )-----------( 348      ( 04 Mar 2019 06:40 )             37.5     137  |  102  |  25<H>  ----------------------------<  190<H>  4.6   |  22  |  1.17    Ca    9.3      04 Mar 2019 06:40  Phos  4.1     03-04  Mg     1.9     03-04    Creatinine Trend: 1.17<--, 0.87<--, 0.88<--, 0.85<--     PHYSICAL EXAM  Vital Signs Last 24 Hrs  T(C): 36.6 (04 Mar 2019 05:01), Max: 36.8 (03 Mar 2019 20:38)  T(F): 97.9 (04 Mar 2019 05:01), Max: 98.3 (03 Mar 2019 20:38)  HR: 73 (04 Mar 2019 05:01) (63 - 73)  BP: 114/69 (04 Mar 2019 05:01) (99/56 - 126/72)  RR: 17 (04 Mar 2019 05:01) (16 - 17)  SpO2: 98% (04 Mar 2019 05:01) (97% - 100%)    Cardiovascular:  S1S2 RRR, No JVD  Respiratory: Lungs clear to auscultation, normal effort  Gastrointestinal: Abdomen soft, ND, NT, +BS  Skin: Warm, dry, intact. No rash.  Musculoskeletal: Normal ROM, normal strength  Ext: No C/C/E B/L LE    DIAGNOSTIC DATA  TELEMETRY: SR    < from: TTE with Doppler (w/Cont) (10.01.18 @ 16:08) >  CONCLUSIONS:  Technically difficult study.  1. Mitral annular calcification, otherwise normal mitral  valve. Mild mitral regurgitation.  2. Endocardium not well visualized; grossly moderate global  left ventricular systolic dysfunction.  Endocardial  visualization enhanced with intravenous injection of echo  contrast (Definity).  3. Unable to accurately evaluate right ventricular size or  systolic function.  ------------------------------------------------------------------------  Confirmed on  10/1/2018 - 18:07:01 by Guanako Foley M.D.    < end of copied text >      < from: Cardiac Cath Lab - Adult (10.29.18 @ 10:37) >  VENTRICLES: No LV gram was performed; however, a recent echocardiogram  demonstrated an EF of 40 %.  CORONARY VESSELS: The coronary circulation is right dominant.  LM:   --  LM: Normal.  LAD:   --  Proximal LAD: There was a 100 % stenosis.  --  Mid LAD: The artery was supplied by a patent bypass graft.  --  Distal LAD: Angiography showed mild to moderate atherosclerosis with no  flow limiting lesions. There was a discrete 60 % stenosis.  CX:   --  Proximal circumflex: Angiography showed minor luminal  irregularities with no flow limiting lesions.  The stent in the proximal circumflex is patent.  --  Distal circumflex: Angiography showed minor luminal irregularities with  no flow limiting lesions.  --  OM1: Angiography showed mild atherosclerosis with no flow limiting  lesions. There was a tubular 30 % stenosis.  --  OM2: The artery was supplied by collaterals. There was a 100 %  stenosis.  RCA:   --  Proximal RCA: There was a 100 % stenosis.  --  RPDA: The artery was supplied by a diseased bypass graft.  GRAFTS:   --  Graft to the mid LAD: The graft was a LIMA. Graft angiography  showed minor luminal irregularities.  --  Graft to the 1st diagonal: The graft was a saphenous vein graft from  the aorta. It was occluded.  --  Graft to the 2nd obtuse marginal: The graft was a saphenous vein graft  from the aorta. It was occluded.  --  Graft to the distal RCA: The graft was a PREETI. It was poorly  visualized. Graft angiography showed minor luminal irregularities.  COMPLICATIONS: There were no complications.  DIAGNOSTIC RECOMMENDATIONS: will need to optimize anti-anginal therapy.  Prepared and signed by  Xochitl Bailey M.D.  Signed 10/29/2018 15:36:22    < end of copied text >    ASSESSMENT AND PLAN:    59y Male with a history of hypertension, hypercholesterolemia, diabetes, transient palpitations, coronary artery disease, status post MI in November 2005 with four-vessel bypass in 2005 at [facility], s/p PCI to LCX in 2012 at [facility] (at that time LIMA-LAD patent, PREETI- RPDA patent, but SVG-D1 and SVG-OM occluded), with ischemic CM, Ef 40%, recent cath noted above managed medically, admitted with acute on chronic decomp systolic CHF    --diuresing well, CHF compensated  --changed to PO Lasix today   --Repeat TTE pending  --Repeat ischemic eval pending above

## 2019-03-04 NOTE — PROGRESS NOTE ADULT - ATTENDING COMMENTS
Patient seen and examined.  Agree with above.   -symptoms improved after diuresis  -check tte    Renata Harvey MD

## 2019-03-04 NOTE — DISCHARGE NOTE ADULT - CARE PLAN
Principal Discharge DX:	Acute on chronic systolic (congestive) heart failure  Goal:	To relieve and prevent worsening symptoms associated with congestive heart failure, to improve quality of life, and to treat underlying conditions such as coronary heart disease, high blood pressure, or diabetes, and to maintain euvolemia.  Assessment and plan of treatment:	You have heart failure in setting of increase salt intake. continue lasix and decrease salt intake. fluid restriction 1500 ml daily, monitor your fluid intake and weight daily, exercise as tolerated 30 minutes daily, and follow up with Followup with Dr Bailey in 1 week  Secondary Diagnosis:	CAD (coronary artery disease)  Goal:	To be asymptomatic, to reduce risks factors such as hypertension, diabetes and hyperlipidemia to lower the risk of blood clots formation; and to prevent complications of coronary artery disease such as worsening chest pain, heart attack and death.  Assessment and plan of treatment:	Continue aspirin and Plavix, do not stop unless instructed by your physician.  Continue low salt, fat, cholesterol and carbohydrate diet. Follow up with cardiologist and primary care physician's routine appointment.  Secondary Diagnosis:	DM (diabetes mellitus)  Goal:	To maintain an acceptable blood glucose level and to prevent diabetic complications such as uncontrolled diabetes, diabetic coma, blindness, renal failure, and amputations.  Assessment and plan of treatment:	Hypoglycemia management: please check your fingerstick every morning or if you are not feeling well. If your FS is >300mg/dl X3 or more readings please contact your PMD/Endocrinologist. If your FS is low <70mg/dl and/or you have symptoms of very low blood sugar FIRST drink1/2 cup of apple juice (or take 4 glucose tab/tube of glucose gel) and recheck FS in 15mins. Repeat these steps until blood sugar is above 100mg/dl, if NECESSARY. Then call your provider to discuss low blood sugar.   What to expend at followup appointment: please bring a log of your fingerstick and/or your glucometer to you appointment. Your blood sugar tracking will help your diabetes team determine the best plan  Secondary Diagnosis:	HTN (hypertension)  Goal:	To maintain a normal blood pressure to prevent heart attack, stroke and renal failure.  Assessment and plan of treatment:	Low sodium and fat diet, continue anti-hypertensive medications, and follow up with primary care physician.  Secondary Diagnosis:	HLD (hyperlipidemia)  Goal:	To maintain normal cholesterol levels to prevent stroke, coronary artery disease, peripheral vascular disease and heart attacks.  Assessment and plan of treatment:	Low fat diet, exercise daily and continue current medications. Follow up with primary care physician and cardiologist for management. Principal Discharge DX:	Acute on chronic systolic (congestive) heart failure  Goal:	To relieve and prevent worsening symptoms associated with congestive heart failure, to improve quality of life, and to treat underlying conditions such as coronary heart disease, high blood pressure, or diabetes, and to maintain euvolemia.  Assessment and plan of treatment:	You have heart failure in setting of increase salt intake. continue lasix and decrease salt intake. fluid restriction 1500 ml daily, monitor your fluid intake and weight daily, exercise as tolerated 30 minutes daily, and follow up with Followup with Dr Bailey on 3/8 11:30am  Secondary Diagnosis:	CAD (coronary artery disease)  Goal:	To be asymptomatic, to reduce risks factors such as hypertension, diabetes and hyperlipidemia to lower the risk of blood clots formation; and to prevent complications of coronary artery disease such as worsening chest pain, heart attack and death.  Assessment and plan of treatment:	Continue aspirin and Plavix, do not stop unless instructed by your physician.  Continue low salt, fat, cholesterol and carbohydrate diet. Follow up with cardiologist and primary care physician's routine appointment.  Secondary Diagnosis:	DM (diabetes mellitus)  Goal:	To maintain an acceptable blood glucose level and to prevent diabetic complications such as uncontrolled diabetes, diabetic coma, blindness, renal failure, and amputations.  Assessment and plan of treatment:	Hypoglycemia management: please check your fingerstick every morning or if you are not feeling well. If your FS is >300mg/dl X3 or more readings please contact your PMD/Endocrinologist. If your FS is low <70mg/dl and/or you have symptoms of very low blood sugar FIRST drink1/2 cup of apple juice (or take 4 glucose tab/tube of glucose gel) and recheck FS in 15mins. Repeat these steps until blood sugar is above 100mg/dl, if NECESSARY. Then call your provider to discuss low blood sugar.   What to expend at followup appointment: please bring a log of your fingerstick and/or your glucometer to you appointment. Your blood sugar tracking will help your diabetes team determine the best plan  Secondary Diagnosis:	HTN (hypertension)  Goal:	To maintain a normal blood pressure to prevent heart attack, stroke and renal failure.  Assessment and plan of treatment:	Low sodium and fat diet, continue anti-hypertensive medications, and follow up with primary care physician.  Secondary Diagnosis:	HLD (hyperlipidemia)  Goal:	To maintain normal cholesterol levels to prevent stroke, coronary artery disease, peripheral vascular disease and heart attacks.  Assessment and plan of treatment:	Low fat diet, exercise daily and continue current medications. Follow up with primary care physician and cardiologist for management.

## 2019-03-04 NOTE — DISCHARGE NOTE ADULT - CARE PROVIDER_API CALL
Xochitl Bailey)  Interventional Cardiology  [address]  [place] [address]  Phone: [phone]  Fax: [phone]  Follow Up Time:

## 2019-03-04 NOTE — DISCHARGE NOTE ADULT - PATIENT PORTAL LINK FT
You can access the [facility] Patient Portal, offered by [facility], by registering with the following website: [email or web address]

## 2019-03-04 NOTE — DISCHARGE NOTE ADULT - MEDICATION SUMMARY - MEDICATIONS TO TAKE
I will START or STAY ON the medications listed below when I get home from the hospital:    aspirin 81 mg oral tablet  -- 1 tab(s) by mouth once a day  -- Indication: For CAD (coronary artery disease)    lisinopril 2.5 mg oral tablet  -- 1 tab(s) by mouth once a day  -- Indication: For CAD (coronary artery disease)    Basaglar KwikPen 100 units/mL subcutaneous solution  -- 40 unit(s) subcutaneous 2 times a day  -- Indication: For DM (diabetes mellitus)    Admelog SoloStar 100 units/mL injectable solution  -- 20 unit(s) injectable 3 times a day (with meals)  -- Indication: For DM (diabetes mellitus)    metFORMIN 850 mg oral tablet  -- 1 tab(s) by mouth 3 times a day  -- Indication: For DM (diabetes mellitus)    atorvastatin 80 mg oral tablet  -- 1 tab(s) by mouth once a day  -- Indication: For HLD (hyperlipidemia)    Plavix 75 mg oral tablet  -- 1 tab(s) by mouth once a day  -- Indication: For CAD (coronary artery disease)    metoprolol tartrate 25 mg oral tablet  -- 1 tab(s) by mouth every 12 hours  -- Indication: For HTN (hypertension)    furosemide 40 mg oral tablet  -- 1 tab(s) by mouth once a day  -- Indication: For Heart failure    pantoprazole 40 mg oral delayed release tablet  -- 1 tab(s) by mouth once a day (before a meal)  -- Indication: For GERD (gastroesophageal reflux disease)    Vitamin D2 50,000 intl units (1.25 mg) oral capsule  -- 1 cap(s) by mouth once a week  -- Indication: For Supplement    Vitamin B12 1000 mcg oral tablet  -- 1 tab(s) by mouth once a day  -- Indication: For Supplement

## 2019-09-05 NOTE — H&P ADULT - ALLERGIC/IMMUNOLOGIC
Problem: VTE, Risk for  Intervention: # Implement/maintain early mobilization  Early mobilization is recommended including HOB up or dangle for 20 minutes during the first 24 hours; Progress activity 2-4 times/day each subsequent day, unless contraindicated.     09/05/19 0468   Activity and Safety   Activity Resting in bed;Ambulated to bathroom   Positioning Independent   Mobility   Head of Bed Elevation 45 degrees   Range of Motion Active;All extremities         Problem: Activity Intolerance  Goal: # Functional status is maintained or returned to baseline  Outcome: Outcome Met, Continue evaluating goal progress toward completion  Patient states that she is feeling much better today. Patient denies any pain, SOB, or any other s/s of distress. Patient ambulates and transfers independently in room with no complications.        not applicable

## 2020-07-03 ENCOUNTER — APPOINTMENT (OUTPATIENT)
Dept: DISASTER EMERGENCY | Facility: CLINIC | Age: 60
End: 2020-07-03

## 2020-07-06 ENCOUNTER — INPATIENT (INPATIENT)
Facility: HOSPITAL | Age: 60
LOS: 0 days | Discharge: HOME | End: 2020-07-07
Attending: INTERNAL MEDICINE | Admitting: INTERNAL MEDICINE
Payer: MEDICAID

## 2020-07-06 VITALS
RESPIRATION RATE: 18 BRPM | HEART RATE: 73 BPM | SYSTOLIC BLOOD PRESSURE: 155 MMHG | DIASTOLIC BLOOD PRESSURE: 71 MMHG | OXYGEN SATURATION: 100 % | TEMPERATURE: 98 F

## 2020-07-06 DIAGNOSIS — Z98.890 OTHER SPECIFIED POSTPROCEDURAL STATES: Chronic | ICD-10-CM

## 2020-07-06 PROBLEM — I10 ESSENTIAL (PRIMARY) HYPERTENSION: Chronic | Status: ACTIVE | Noted: 2019-03-01

## 2020-07-06 PROBLEM — K21.9 GASTRO-ESOPHAGEAL REFLUX DISEASE WITHOUT ESOPHAGITIS: Chronic | Status: ACTIVE | Noted: 2019-03-01

## 2020-07-06 LAB
ANION GAP SERPL CALC-SCNC: 9 MMO/L — SIGNIFICANT CHANGE UP (ref 7–14)
BUN SERPL-MCNC: 28 MG/DL — HIGH (ref 7–23)
CALCIUM SERPL-MCNC: 9.5 MG/DL — SIGNIFICANT CHANGE UP (ref 8.4–10.5)
CHLORIDE SERPL-SCNC: 105 MMOL/L — SIGNIFICANT CHANGE UP (ref 98–107)
CO2 SERPL-SCNC: 24 MMOL/L — SIGNIFICANT CHANGE UP (ref 22–31)
CREAT SERPL-MCNC: 1.28 MG/DL — SIGNIFICANT CHANGE UP (ref 0.5–1.3)
GLUCOSE BLDC GLUCOMTR-MCNC: 110 MG/DL — HIGH (ref 70–99)
GLUCOSE BLDC GLUCOMTR-MCNC: 133 MG/DL — HIGH (ref 70–99)
GLUCOSE BLDC GLUCOMTR-MCNC: 78 MG/DL — SIGNIFICANT CHANGE UP (ref 70–99)
GLUCOSE SERPL-MCNC: 145 MG/DL — HIGH (ref 70–99)
HBA1C BLD-MCNC: 9.2 % — HIGH (ref 4–5.6)
HCT VFR BLD CALC: 28.9 % — LOW (ref 39–50)
HCT VFR BLD CALC: 31.3 % — LOW (ref 39–50)
HGB BLD-MCNC: 8 G/DL — LOW (ref 13–17)
HGB BLD-MCNC: 8.5 G/DL — LOW (ref 13–17)
MCHC RBC-ENTMCNC: 18.5 PG — LOW (ref 27–34)
MCHC RBC-ENTMCNC: 18.6 PG — LOW (ref 27–34)
MCHC RBC-ENTMCNC: 27.2 % — LOW (ref 32–36)
MCHC RBC-ENTMCNC: 27.7 % — LOW (ref 32–36)
MCV RBC AUTO: 67.1 FL — LOW (ref 80–100)
MCV RBC AUTO: 68 FL — LOW (ref 80–100)
NRBC # FLD: 0 K/UL — SIGNIFICANT CHANGE UP (ref 0–0)
NRBC # FLD: 0.02 K/UL — SIGNIFICANT CHANGE UP (ref 0–0)
PLATELET # BLD AUTO: 282 K/UL — SIGNIFICANT CHANGE UP (ref 150–400)
PLATELET # BLD AUTO: 313 K/UL — SIGNIFICANT CHANGE UP (ref 150–400)
PMV BLD: 10.6 FL — SIGNIFICANT CHANGE UP (ref 7–13)
PMV BLD: 10.7 FL — SIGNIFICANT CHANGE UP (ref 7–13)
POTASSIUM SERPL-MCNC: 4.7 MMOL/L — SIGNIFICANT CHANGE UP (ref 3.5–5.3)
POTASSIUM SERPL-SCNC: 4.7 MMOL/L — SIGNIFICANT CHANGE UP (ref 3.5–5.3)
RBC # BLD: 4.31 M/UL — SIGNIFICANT CHANGE UP (ref 4.2–5.8)
RBC # BLD: 4.6 M/UL — SIGNIFICANT CHANGE UP (ref 4.2–5.8)
RBC # FLD: 20.8 % — HIGH (ref 10.3–14.5)
RBC # FLD: 21.2 % — HIGH (ref 10.3–14.5)
SODIUM SERPL-SCNC: 138 MMOL/L — SIGNIFICANT CHANGE UP (ref 135–145)
WBC # BLD: 7.92 K/UL — SIGNIFICANT CHANGE UP (ref 3.8–10.5)
WBC # BLD: 8.94 K/UL — SIGNIFICANT CHANGE UP (ref 3.8–10.5)
WBC # FLD AUTO: 7.92 K/UL — SIGNIFICANT CHANGE UP (ref 3.8–10.5)
WBC # FLD AUTO: 8.94 K/UL — SIGNIFICANT CHANGE UP (ref 3.8–10.5)

## 2020-07-06 PROCEDURE — 93010 ELECTROCARDIOGRAM REPORT: CPT

## 2020-07-06 RX ORDER — INSULIN LISPRO 100/ML
21 VIAL (ML) SUBCUTANEOUS
Refills: 0 | Status: DISCONTINUED | OUTPATIENT
Start: 2020-07-06 | End: 2020-07-07

## 2020-07-06 RX ORDER — FUROSEMIDE 40 MG
40 TABLET ORAL DAILY
Refills: 0 | Status: DISCONTINUED | OUTPATIENT
Start: 2020-07-06 | End: 2020-07-06

## 2020-07-06 RX ORDER — PREGABALIN 225 MG/1
1000 CAPSULE ORAL DAILY
Refills: 0 | Status: DISCONTINUED | OUTPATIENT
Start: 2020-07-06 | End: 2020-07-07

## 2020-07-06 RX ORDER — SODIUM CHLORIDE 9 MG/ML
1000 INJECTION, SOLUTION INTRAVENOUS
Refills: 0 | Status: DISCONTINUED | OUTPATIENT
Start: 2020-07-06 | End: 2020-07-07

## 2020-07-06 RX ORDER — FUROSEMIDE 40 MG
40 TABLET ORAL
Refills: 0 | Status: DISCONTINUED | OUTPATIENT
Start: 2020-07-06 | End: 2020-07-07

## 2020-07-06 RX ORDER — ASPIRIN/CALCIUM CARB/MAGNESIUM 324 MG
81 TABLET ORAL DAILY
Refills: 0 | Status: DISCONTINUED | OUTPATIENT
Start: 2020-07-07 | End: 2020-07-07

## 2020-07-06 RX ORDER — METFORMIN HYDROCHLORIDE 850 MG/1
1 TABLET ORAL
Qty: 0 | Refills: 0 | DISCHARGE

## 2020-07-06 RX ORDER — METOPROLOL TARTRATE 50 MG
25 TABLET ORAL EVERY 12 HOURS
Refills: 0 | Status: DISCONTINUED | OUTPATIENT
Start: 2020-07-06 | End: 2020-07-07

## 2020-07-06 RX ORDER — PANTOPRAZOLE SODIUM 20 MG/1
40 TABLET, DELAYED RELEASE ORAL
Refills: 0 | Status: DISCONTINUED | OUTPATIENT
Start: 2020-07-07 | End: 2020-07-07

## 2020-07-06 RX ORDER — GLUCAGON INJECTION, SOLUTION 0.5 MG/.1ML
1 INJECTION, SOLUTION SUBCUTANEOUS ONCE
Refills: 0 | Status: DISCONTINUED | OUTPATIENT
Start: 2020-07-06 | End: 2020-07-07

## 2020-07-06 RX ORDER — INSULIN LISPRO 100/ML
VIAL (ML) SUBCUTANEOUS
Refills: 0 | Status: DISCONTINUED | OUTPATIENT
Start: 2020-07-06 | End: 2020-07-07

## 2020-07-06 RX ORDER — LISINOPRIL 2.5 MG/1
2.5 TABLET ORAL DAILY
Refills: 0 | Status: DISCONTINUED | OUTPATIENT
Start: 2020-07-07 | End: 2020-07-07

## 2020-07-06 RX ORDER — INSULIN GLARGINE 100 [IU]/ML
40 INJECTION, SOLUTION SUBCUTANEOUS
Qty: 0 | Refills: 0 | DISCHARGE

## 2020-07-06 RX ORDER — CLOPIDOGREL BISULFATE 75 MG/1
75 TABLET, FILM COATED ORAL DAILY
Refills: 0 | Status: DISCONTINUED | OUTPATIENT
Start: 2020-07-06 | End: 2020-07-07

## 2020-07-06 RX ORDER — DEXTROSE 50 % IN WATER 50 %
25 SYRINGE (ML) INTRAVENOUS ONCE
Refills: 0 | Status: DISCONTINUED | OUTPATIENT
Start: 2020-07-06 | End: 2020-07-07

## 2020-07-06 RX ORDER — ERGOCALCIFEROL 1.25 MG/1
1 CAPSULE ORAL
Qty: 0 | Refills: 0 | DISCHARGE

## 2020-07-06 RX ORDER — DEXTROSE 50 % IN WATER 50 %
15 SYRINGE (ML) INTRAVENOUS ONCE
Refills: 0 | Status: DISCONTINUED | OUTPATIENT
Start: 2020-07-06 | End: 2020-07-07

## 2020-07-06 RX ORDER — INSULIN GLARGINE 100 [IU]/ML
64 INJECTION, SOLUTION SUBCUTANEOUS AT BEDTIME
Refills: 0 | Status: DISCONTINUED | OUTPATIENT
Start: 2020-07-06 | End: 2020-07-07

## 2020-07-06 RX ORDER — DEXTROSE 50 % IN WATER 50 %
12.5 SYRINGE (ML) INTRAVENOUS ONCE
Refills: 0 | Status: DISCONTINUED | OUTPATIENT
Start: 2020-07-06 | End: 2020-07-07

## 2020-07-06 RX ORDER — ATORVASTATIN CALCIUM 80 MG/1
80 TABLET, FILM COATED ORAL AT BEDTIME
Refills: 0 | Status: DISCONTINUED | OUTPATIENT
Start: 2020-07-06 | End: 2020-07-07

## 2020-07-06 RX ORDER — SODIUM CHLORIDE 9 MG/ML
3 INJECTION INTRAMUSCULAR; INTRAVENOUS; SUBCUTANEOUS EVERY 8 HOURS
Refills: 0 | Status: DISCONTINUED | OUTPATIENT
Start: 2020-07-06 | End: 2020-07-07

## 2020-07-06 RX ADMIN — SODIUM CHLORIDE 3 MILLILITER(S): 9 INJECTION INTRAMUSCULAR; INTRAVENOUS; SUBCUTANEOUS at 21:21

## 2020-07-06 RX ADMIN — CLOPIDOGREL BISULFATE 75 MILLIGRAM(S): 75 TABLET, FILM COATED ORAL at 21:22

## 2020-07-06 RX ADMIN — INSULIN GLARGINE 64 UNIT(S): 100 INJECTION, SOLUTION SUBCUTANEOUS at 22:39

## 2020-07-06 RX ADMIN — ATORVASTATIN CALCIUM 80 MILLIGRAM(S): 80 TABLET, FILM COATED ORAL at 21:22

## 2020-07-06 NOTE — H&P CARDIOLOGY - PMH
CAD (coronary artery disease)    CHF (congestive heart failure)    COPD (chronic obstructive pulmonary disease)    DM (diabetes mellitus)    GERD (gastroesophageal reflux disease)    HLD (hyperlipidemia)    HTN (hypertension)    Stented coronary artery  2013

## 2020-07-06 NOTE — CHART NOTE - NSCHARTNOTEFT_GEN_A_CORE
SARITHA DENTON 59yo M s/p cardiac cath via right femoral access.  Site is stable with no hematoma, active bleed or swelling.  Dressing is clean/dry/intact.  DP pulse is palpable.  Patient denies pain, numbness, tingling, CP, SOB. VSS. Will continue to monitor.     T(C): 36.4 (07-06-20 @ 20:13), Max: 36.4 (07-06-20 @ 20:13)  HR: 73 (07-06-20 @ 20:13) (73 - 73)  BP: 155/71 (07-06-20 @ 20:13) (155/71 - 155/71)  RR: 18 (07-06-20 @ 20:13) (18 - 18)  SpO2: 100% (07-06-20 @ 20:13) (100% - 100%)

## 2020-07-06 NOTE — H&P CARDIOLOGY - ATTENDING COMMENTS
Patient seen and examined.  Agree with above.   S/p cardiac cath  Medical management of cad recommended  Pt. with elevated EDP - will start IV lasix to keep O > I  GI and Heme eval for anemia called    Renata Harvey MD

## 2020-07-06 NOTE — H&P CARDIOLOGY - PSH
S/P arthroscopic knee surgery    S/P CABG (coronary artery bypass graft)  2005 H/O eye surgery    S/P arthroscopic knee surgery    S/P CABG (coronary artery bypass graft)  2005

## 2020-07-06 NOTE — H&P CARDIOLOGY - HISTORY OF PRESENT ILLNESS
60 y.o. male presents today for elective cardiac catheterization.   Echo 3/9/20 - EF 56%. Mild LAE 60 y.o. male presents today for elective cardiac catheterization due to RESENDIZ. The patient  denies fever, chills, recent travel, headache, dizziness, visual deficits, chest pain, shortness of breath, orthopnea, palpitations, abdominal pain, N/V/D/C, hematochezia, melena, dysuria, hematuria, PE, DVT, CVA, YUDELKA, LOC, syncope, peripheral edema. The patient was evaluated by his cardiologist, was recommended to have cardiac cath.     Echo 3/9/20 - EF 56%. Mild LAE 60 y.o. male presents today for elective cardiac catheterization due to RESENDIZ. The patient  denies fever, chills, recent travel, headache, dizziness, visual deficits, chest pain, shortness of breath, orthopnea, palpitations, abdominal pain, N/V/D/C, hematochezia, melena, dysuria, hematuria, PE, DVT, CVA, YUDELKA, LOC, syncope, peripheral edema. The patient was evaluated by his cardiologist, was recommended to have cardiac cath.   The patient admits to non compliance with Insulin and sometimes uses Lantus once/day and Admelog Solostar 1-2 times/day.     Echo 3/9/20 - EF 56%. Mild LAE

## 2020-07-06 NOTE — H&P CARDIOLOGY - REVIEW OF SYSTEMS
The patient  denies fever, chills, recent travel, headache, dizziness, visual deficits, chest pain, shortness of breath, orthopnea, palpitations, abdominal pain, N/V/D/C, hematochezia, melena, dysuria, hematuria, PE, DVT, CVA, YUDELKA, LOC, syncope, peripheral edema.

## 2020-07-07 ENCOUNTER — TRANSCRIPTION ENCOUNTER (OUTPATIENT)
Age: 60
End: 2020-07-07

## 2020-07-07 VITALS
DIASTOLIC BLOOD PRESSURE: 55 MMHG | SYSTOLIC BLOOD PRESSURE: 117 MMHG | TEMPERATURE: 98 F | OXYGEN SATURATION: 98 % | RESPIRATION RATE: 18 BRPM | HEART RATE: 65 BPM

## 2020-07-07 DIAGNOSIS — D64.9 ANEMIA, UNSPECIFIED: ICD-10-CM

## 2020-07-07 DIAGNOSIS — Z71.89 OTHER SPECIFIED COUNSELING: ICD-10-CM

## 2020-07-07 DIAGNOSIS — I25.10 ATHEROSCLEROTIC HEART DISEASE OF NATIVE CORONARY ARTERY WITHOUT ANGINA PECTORIS: ICD-10-CM

## 2020-07-07 LAB
ALBUMIN SERPL ELPH-MCNC: 4 G/DL — SIGNIFICANT CHANGE UP (ref 3.3–5)
ALP SERPL-CCNC: 92 U/L — SIGNIFICANT CHANGE UP (ref 40–120)
ALT FLD-CCNC: 24 U/L — SIGNIFICANT CHANGE UP (ref 4–41)
ANION GAP SERPL CALC-SCNC: 13 MMO/L — SIGNIFICANT CHANGE UP (ref 7–14)
AST SERPL-CCNC: 26 U/L — SIGNIFICANT CHANGE UP (ref 4–40)
BILIRUB SERPL-MCNC: 1.2 MG/DL — SIGNIFICANT CHANGE UP (ref 0.2–1.2)
BUN SERPL-MCNC: 21 MG/DL — SIGNIFICANT CHANGE UP (ref 7–23)
CALCIUM SERPL-MCNC: 9.7 MG/DL — SIGNIFICANT CHANGE UP (ref 8.4–10.5)
CHLORIDE SERPL-SCNC: 103 MMOL/L — SIGNIFICANT CHANGE UP (ref 98–107)
CO2 SERPL-SCNC: 23 MMOL/L — SIGNIFICANT CHANGE UP (ref 22–31)
CREAT SERPL-MCNC: 0.95 MG/DL — SIGNIFICANT CHANGE UP (ref 0.5–1.3)
GLUCOSE BLDC GLUCOMTR-MCNC: 101 MG/DL — HIGH (ref 70–99)
GLUCOSE BLDC GLUCOMTR-MCNC: 97 MG/DL — SIGNIFICANT CHANGE UP (ref 70–99)
GLUCOSE SERPL-MCNC: 129 MG/DL — HIGH (ref 70–99)
HCT VFR BLD CALC: 29.7 % — LOW (ref 39–50)
HCT VFR BLD CALC: 33.7 % — LOW (ref 39–50)
HGB BLD-MCNC: 8.6 G/DL — LOW (ref 13–17)
HGB BLD-MCNC: 9.6 G/DL — LOW (ref 13–17)
MCHC RBC-ENTMCNC: 18.6 PG — LOW (ref 27–34)
MCHC RBC-ENTMCNC: 18.8 PG — LOW (ref 27–34)
MCHC RBC-ENTMCNC: 28.5 % — LOW (ref 32–36)
MCHC RBC-ENTMCNC: 29 % — LOW (ref 32–36)
MCV RBC AUTO: 65 FL — LOW (ref 80–100)
MCV RBC AUTO: 65.4 FL — LOW (ref 80–100)
NRBC # FLD: 0 K/UL — SIGNIFICANT CHANGE UP (ref 0–0)
NRBC # FLD: 0 K/UL — SIGNIFICANT CHANGE UP (ref 0–0)
PLATELET # BLD AUTO: 275 K/UL — SIGNIFICANT CHANGE UP (ref 150–400)
PLATELET # BLD AUTO: 330 K/UL — SIGNIFICANT CHANGE UP (ref 150–400)
PMV BLD: 10.8 FL — SIGNIFICANT CHANGE UP (ref 7–13)
PMV BLD: 11 FL — SIGNIFICANT CHANGE UP (ref 7–13)
POTASSIUM SERPL-MCNC: 4.1 MMOL/L — SIGNIFICANT CHANGE UP (ref 3.5–5.3)
POTASSIUM SERPL-SCNC: 4.1 MMOL/L — SIGNIFICANT CHANGE UP (ref 3.5–5.3)
PROT SERPL-MCNC: 6.9 G/DL — SIGNIFICANT CHANGE UP (ref 6–8.3)
RBC # BLD: 4.57 M/UL — SIGNIFICANT CHANGE UP (ref 4.2–5.8)
RBC # BLD: 5.15 M/UL — SIGNIFICANT CHANGE UP (ref 4.2–5.8)
RBC # FLD: 20.9 % — HIGH (ref 10.3–14.5)
RBC # FLD: 21.4 % — HIGH (ref 10.3–14.5)
SODIUM SERPL-SCNC: 139 MMOL/L — SIGNIFICANT CHANGE UP (ref 135–145)
WBC # BLD: 11.1 K/UL — HIGH (ref 3.8–10.5)
WBC # BLD: 9.39 K/UL — SIGNIFICANT CHANGE UP (ref 3.8–10.5)
WBC # FLD AUTO: 11.1 K/UL — HIGH (ref 3.8–10.5)
WBC # FLD AUTO: 9.39 K/UL — SIGNIFICANT CHANGE UP (ref 3.8–10.5)

## 2020-07-07 RX ORDER — FUROSEMIDE 40 MG
1 TABLET ORAL
Qty: 60 | Refills: 0
Start: 2020-07-07 | End: 2020-08-05

## 2020-07-07 RX ADMIN — LISINOPRIL 2.5 MILLIGRAM(S): 2.5 TABLET ORAL at 05:03

## 2020-07-07 RX ADMIN — PREGABALIN 1000 MICROGRAM(S): 225 CAPSULE ORAL at 11:45

## 2020-07-07 RX ADMIN — SODIUM CHLORIDE 3 MILLILITER(S): 9 INJECTION INTRAMUSCULAR; INTRAVENOUS; SUBCUTANEOUS at 13:53

## 2020-07-07 RX ADMIN — SODIUM CHLORIDE 3 MILLILITER(S): 9 INJECTION INTRAMUSCULAR; INTRAVENOUS; SUBCUTANEOUS at 05:03

## 2020-07-07 RX ADMIN — Medication 25 MILLIGRAM(S): at 05:03

## 2020-07-07 RX ADMIN — Medication 40 MILLIGRAM(S): at 05:03

## 2020-07-07 RX ADMIN — CLOPIDOGREL BISULFATE 75 MILLIGRAM(S): 75 TABLET, FILM COATED ORAL at 11:45

## 2020-07-07 RX ADMIN — PANTOPRAZOLE SODIUM 40 MILLIGRAM(S): 20 TABLET, DELAYED RELEASE ORAL at 05:03

## 2020-07-07 RX ADMIN — Medication 81 MILLIGRAM(S): at 11:45

## 2020-07-07 RX ADMIN — Medication 21 UNIT(S): at 08:59

## 2020-07-07 NOTE — DISCHARGE NOTE PROVIDER - NSDCCPCAREPLAN_GEN_ALL_CORE_FT
PRINCIPAL DISCHARGE DIAGNOSIS  Diagnosis: CAD (coronary artery disease)  Assessment and Plan of Treatment: Continue aspirin, plavix and atorvastatin, do not stop unless instructed by your physician.  Continue low salt, fat, cholesterol and carbohydrate diet. Follow up with cardiologist at your scheduled appointment.      SECONDARY DISCHARGE DIAGNOSES  Diagnosis: CHF NYHA class II  Assessment and Plan of Treatment: Continue water pill (lasix) twice daily as prescribed. Follow up with Dr. Burks for ICD placement.   Low salt diet, fluid restriction to 1500 ml daily, monitor your fluid intake and weight daily, exercise as tolerated 30 minutes daily, and follow up with your physician within 1 to 2 weeks.       Diagnosis: Anemia  Assessment and Plan of Treatment: Follow up with your GI doctor on 7/15 for Endoscopy and colonscopy as scheduled. Follow up outpatient with hematology Dr. Acosta for further anemia work up.    Diagnosis: Type II diabetes mellitus  Assessment and Plan of Treatment: Your A1C is 9 indicating that your diabetes is poorly controlled. Goal A1C <7. Please continue your home insulin regimen for now but follow up with Dr. Rain in 1-2 weeks for further management. Please call to make an appointment.

## 2020-07-07 NOTE — CONSULT NOTE ADULT - SUBJECTIVE AND OBJECTIVE BOX
Chief Complaint:  Patient is a 60y old  Male who presents with a chief complaint of   Stented coronary artery  GERD (gastroesophageal reflux disease)  HTN (hypertension)  COPD (chronic obstructive pulmonary disease)  CHF (congestive heart failure)  CAD (coronary artery disease)  DM (diabetes mellitus)  HLD (hyperlipidemia)  HTN - Hypertension  H/O eye surgery  S/P arthroscopic knee surgery  H/O arthroscopic knee surgery  H/O cardiac catheterization  S/P CABG (coronary artery bypass graft)  DM (diabetes mellitus)  CAD (coronary artery disease)  HLD (hyperlipidemia)  HTN (hypertension)     HPI:  60 y.o. male presents today for elective cardiac catheterization due to RESENDIZ. The patient  denies fever, chills, recent travel, headache, dizziness, visual deficits, chest pain, shortness of breath, orthopnea, palpitations, abdominal pain, N/V/D/C, hematochezia, melena, dysuria, hematuria, PE, DVT, CVA, YUDELKA, LOC, syncope, peripheral edema. The patient was evaluated by his cardiologist, was recommended to have cardiac cath.   The patient admits to non compliance with Insulin and sometimes uses Lantus once/day and Admelog Solostar 1-2 times/day.   GI consulted for anemia. The patient reports this is new and he is aware from labs from his PCP. He has made an appointment with his private gastroenterologist, Dr. Berumen, for EGD/Colonoscopy which is scheduled for 7/15. He has no new gi complaints. Patient reports no abdominal pain, no nausea/vomiting, no melena/hematochezia, no hematemesis, no constipation/diarrhea, no weight loss or appetite changes, no bowel habit changes, no dysphagia/odynophagia, no fever/chills. No FHx of colon, gastric, pancreatic or gallbladder disease to their knowledge.     Echo 3/9/20 - EF 56%. Mild LAE (06 Jul 2020 12:44)      penicillin (Rash)      aspirin enteric coated 81 milliGRAM(s) Oral daily  atorvastatin 80 milliGRAM(s) Oral at bedtime  clopidogrel Tablet 75 milliGRAM(s) Oral daily  cyanocobalamin 1000 MICROGram(s) Oral daily  dextrose 40% Gel 15 Gram(s) Oral once PRN  dextrose 5%. 1000 milliLiter(s) IV Continuous <Continuous>  dextrose 50% Injectable 12.5 Gram(s) IV Push once  dextrose 50% Injectable 25 Gram(s) IV Push once  dextrose 50% Injectable 25 Gram(s) IV Push once  furosemide   Injectable 40 milliGRAM(s) IV Push two times a day  glucagon  Injectable 1 milliGRAM(s) IntraMuscular once PRN  insulin glargine Injectable (LANTUS) 64 Unit(s) SubCutaneous at bedtime  insulin lispro (HumaLOG) corrective regimen sliding scale   SubCutaneous three times a day before meals  insulin lispro Injectable (HumaLOG) 21 Unit(s) SubCutaneous three times a day before meals  lisinopril 2.5 milliGRAM(s) Oral daily  metoprolol tartrate 25 milliGRAM(s) Oral every 12 hours  pantoprazole    Tablet 40 milliGRAM(s) Oral before breakfast  sodium chloride 0.9% lock flush 3 milliLiter(s) IV Push every 8 hours        FAMILY HISTORY:  No pertinent family history in first degree relatives        Review of Systems:    General:  No wt loss, fevers, chills, night sweats, fatigue  Eyes:  Good vision, no reported pain  ENT:  No sore throat, pain, runny nose, dysphagia  CV:  No pain, palpitations, no lightheadedness  Resp:  No dyspnea, cough, tachypnea, wheezing  GI: denies n/v/d/c, abdominal pain, melena or brbpr   :  No pain, bleeding, incontinence, nocturia  Muscle:  No pain, weakness  Neuro:  No weakness, tingling, memory problems  Psych:  No fatigue, insomnia, mood problems, depression  Endocrine:  No polyuria, polydypsia, cold/heat intolerance  Heme:  No petechiae, ecchymosis, easy bruisability  Skin:  No rash, tattoos, scars, edema    Relevant Family History:   n/c    Relevant Social History: n/c      Physical Exam:    Vital Signs:  Vital Signs Last 24 Hrs  T(C): 36.7 (07 Jul 2020 11:42), Max: 36.7 (07 Jul 2020 11:42)  T(F): 98.1 (07 Jul 2020 11:42), Max: 98.1 (07 Jul 2020 11:42)  HR: 65 (07 Jul 2020 11:42) (65 - 73)  BP: 117/55 (07 Jul 2020 11:42) (117/55 - 155/71)  BP(mean): --  RR: 18 (07 Jul 2020 11:42) (18 - 18)  SpO2: 98% (07 Jul 2020 11:42) (98% - 100%)  Daily     Daily     General:  Appears stated age, well-groomed, nad  HEENT:  NC/AT,  conjunctivae clear and pink, no thyromegaly, nodules, adenopathy, no JVD  Chest:  Full & symmetric excursion, no increased effort, breath sounds clear  Cardiovascular:  Regular rhythm, S1, S2, no murmur/rub/S3/S4, no abdominal bruit, no edema  Abdomen:  Soft, non-tender, non-distended, normoactive bowel sounds,  no masses ,no hepatosplenomeagaly, no signs of chronic liver disease  Extremities:  no cyanosis,clubbing or edema  Skin:  No rash/erythema/ecchymoses/petechiae/wounds/abscess/warm/dry  Neuro/Psych:  A&Ox3  , no asterixis, no tremor, no encephalopathy    Laboratory:                            8.6    11.10 )-----------( 275      ( 07 Jul 2020 06:30 )             29.7     07-07    139  |  103  |  21  ----------------------------<  129<H>  4.1   |  23  |  0.95    Ca    9.7      07 Jul 2020 06:30    TPro  6.9  /  Alb  4.0  /  TBili  1.2  /  DBili  x   /  AST  26  /  ALT  24  /  AlkPhos  92  07-07    LIVER FUNCTIONS - ( 07 Jul 2020 06:30 )  Alb: 4.0 g/dL / Pro: 6.9 g/dL / ALK PHOS: 92 u/L / ALT: 24 u/L / AST: 26 u/L / GGT: x                 Imaging:

## 2020-07-07 NOTE — DISCHARGE NOTE PROVIDER - NSDCFUADDAPPT_GEN_ALL_CORE_FT
follow up with cardiologist Dr. Fraser on Wed 7/15 at 1:15 PM    Follow up with EP, Dr. Burks for ICD placement     Follow up with GI Dr. Berumen, for EGD/Colonoscopy which is scheduled for 7/15.    Follow up with hematology for further anemia work up.    Follow up with Dr. Rain for insulin adjustment - please call to make an appointment.

## 2020-07-07 NOTE — CONSULT NOTE ADULT - ASSESSMENT
1. Anemia w severely microcytic indices and Normal RBC Count Raises Susp for hemoglobinopathy    -- check Hgb Electrophoresis  -- will check iron profile, b12/folate, hemolysis labs, spep/suzanne  -- agree w EGD/Colonoscopy as being planned by Dr Berumen (outpt)  -- if d/c home, no objection, will follow as outpt    Diomedes Acosta MD  [phone]

## 2020-07-07 NOTE — DISCHARGE NOTE PROVIDER - CARE PROVIDER_API CALL
Rogelio Fraser  CARDIOLOGY  [address] [facility]  [place] [address]  Phone: [phone]  Fax: [phone]  Follow Up Time:     Tico Burks)  Cardiac Electrophysiology; Cardiovascular Disease; Nuclear Cardiology  [address] [facility] [address]  [place] [address]  Phone: [phone]  Fax: [phone]  Follow Up Time:     Diomedes Acosta  HEMATOLOGY  [address]  [place] [address]  Phone: [phone]  Fax: [phone]  Follow Up Time:     Rama Rain  Endocrinology, Diabetes and Metabolism  [address] [place] [address]  Phone: [phone]  Fax: [phone]  Follow Up Time:

## 2020-07-07 NOTE — DISCHARGE NOTE NURSING/CASE MANAGEMENT/SOCIAL WORK - PATIENT PORTAL LINK FT
You can access the FollowMyHealth Patient Portal offered by [facility] by registering at the following website: [email or web address]. By joining [email or web address]’s FollowMyHealth portal, you will also be able to view your health information using other applications (apps) compatible with our system.

## 2020-07-07 NOTE — CONSULT NOTE ADULT - SUBJECTIVE AND OBJECTIVE BOX
EP ATTENDING      HISTORY OF PRESENT ILLNESS: He is a pleasant 59 y/o male PMH CAD s/p CABG (2005). Over 3 months ago he was diagnosed with severe LV dysfunction and NYHA Class II CHF. Despite greater than 3 months of optimal medical therapy he continues to have NYHA Class II CHF and severe LV dysfunction (LVEF 30%). A cath did not result in revascularization. EP is now called for a primary prevention single chamber ICD.    PAST MEDICAL & SURGICAL HISTORY:  Stented coronary artery: 2013  GERD (gastroesophageal reflux disease)  HTN (hypertension)  COPD (chronic obstructive pulmonary disease)  CAD (coronary artery disease) s/p CABG (2005)  DM (diabetes mellitus)  HLD (hyperlipidemia)  H/O eye surgery    S/P arthroscopic knee surgery  S/P CABG (coronary artery bypass graft): 2005        MEDICATIONS  (STANDING):  aspirin enteric coated 81 milliGRAM(s) Oral daily  atorvastatin 80 milliGRAM(s) Oral at bedtime  clopidogrel Tablet 75 milliGRAM(s) Oral daily  cyanocobalamin 1000 MICROGram(s) Oral daily  dextrose 5%. 1000 milliLiter(s) (50 mL/Hr) IV Continuous <Continuous>  dextrose 50% Injectable 12.5 Gram(s) IV Push once  dextrose 50% Injectable 25 Gram(s) IV Push once  dextrose 50% Injectable 25 Gram(s) IV Push once  furosemide   Injectable 40 milliGRAM(s) IV Push two times a day  insulin glargine Injectable (LANTUS) 64 Unit(s) SubCutaneous at bedtime  insulin lispro (HumaLOG) corrective regimen sliding scale   SubCutaneous three times a day before meals  insulin lispro Injectable (HumaLOG) 21 Unit(s) SubCutaneous three times a day before meals  lisinopril 2.5 milliGRAM(s) Oral daily  metoprolol tartrate 25 milliGRAM(s) Oral every 12 hours  pantoprazole    Tablet 40 milliGRAM(s) Oral before breakfast  sodium chloride 0.9% lock flush 3 milliLiter(s) IV Push every 8 hours      Allergies    penicillin (Rash)    Intolerances        FAMILY HISTORY:  No pertinent family history in first degree relatives    Non-contributary for premature coronary disease or sudden cardiac death    SOCIAL HISTORY:    [ x] Non-smoker  [ ] Smoker  [ ] Alcohol      REVIEW OF SYSTEMS:  [ ]chest pain  [ x ]shortness of breath  [  ]palpitations  [  ]syncope  [ ]near syncope [ ]upper extremity weakness   [ ] lower extremity weakness  [  ]diplopia  [  ]altered mental status   [  ]fevers  [ ]chills [ ]nausea  [ ]vomitting  [  ]dysphagia    [ ]abdominal pain  [ ]melena  [ ]BRBPR    [  ]epistaxis  [  ]rash    [x ]lower extremity edema        [x ] All others negative	  [ ] Unable to obtain    PHYSICAL EXAM:  T(C): 36.5 (07-07-20 @ 05:01), Max: 36.5 (07-07-20 @ 05:01)  HR: 67 (07-07-20 @ 05:01) (67 - 73)  BP: 121/61 (07-07-20 @ 05:01) (121/61 - 155/71)  RR: 18 (07-07-20 @ 05:01) (18 - 18)  SpO2: 99% (07-07-20 @ 05:01) (99% - 100%)  Wt(kg): --    Appearance: Normal	  HEENT:   Normal oral mucosa, PERRL, EOMI	  Lymphatic: No lymphadenopathy , no edema  Cardiovascular: Normal S1 S2, No JVD, No murmurs , Peripheral pulses palpable 2+ bilaterally  Respiratory: Lungs clear to auscultation, normal effort 	  Gastrointestinal:  Soft, Non-tender, + BS	  Skin: No rashes, No ecchymoses, No cyanosis, warm to touch  Musculoskeletal: Normal range of motion, normal strength  Psychiatry:  Mood & affect appropriate      TELEMETRY: 	    ECG:  	    Echo:  NST:  Cath:  	  	  LABS:	 	                          8.6    11.10 )-----------( 275      ( 07 Jul 2020 06:30 )             29.7     07-07    139  |  103  |  21  ----------------------------<  129<H>  4.1   |  23  |  0.95    Ca    9.7      07 Jul 2020 06:30    TPro  6.9  /  Alb  4.0  /  TBili  1.2  /  DBili  x   /  AST  26  /  ALT  24  /  AlkPhos  92  07-07    proBNP:   Lipid Profile:   HgA1c:   TSH:       A/P) He is a pleasant 59 y/o male PMH CAD s/p CABG (2005). Over 3 months ago he was diagnosed with severe LV dysfunction and NYHA Class II CHF. Despite greater than 3 months of optimal medical therapy he continues to have NYHA Class II CHF and severe LV dysfunction (LVEF 30%). A cath did not result in revascularization. EP is now called for a primary prevention single chamber ICD.    -given the above I recommended a single chamber ICD  -I cited a 3% risk of bleeding or infection, and a 1% risk of major complication including but not limited to heart attack, stroke, death or need for emergency open heart surgery or pericardiocentesis. Understanding his R/B/A he and elect single chamber ICD as outpatient  -d/c home  -f/u with Bria on Wed July 15 at 1:15pm  -I will arrange outpatient single chamber ICD through my       Tico Burks M.D., [facility]  Cardiac Electrophysiology  [facility]  [address] [place] [address], [place] [address]  [email or web address]    office [phone]  pager [phone]

## 2020-07-07 NOTE — DISCHARGE NOTE PROVIDER - PROVIDER TOKENS
PROVIDER:[TOKEN:[[id number]]],PROVIDER:[TOKEN:[[id number]]],PROVIDER:[TOKEN:[[id number]]],PROVIDER:[TOKEN:[[id number]]]

## 2020-07-07 NOTE — PROGRESS NOTE ADULT - SUBJECTIVE AND OBJECTIVE BOX
Patient denies chest pain or shortness of breath.   Review of systems otherwise (-)  	    MEDICATIONS  (STANDING):  aspirin enteric coated 81 milliGRAM(s) Oral daily  atorvastatin 80 milliGRAM(s) Oral at bedtime  clopidogrel Tablet 75 milliGRAM(s) Oral daily  cyanocobalamin 1000 MICROGram(s) Oral daily  dextrose 5%. 1000 milliLiter(s) (50 mL/Hr) IV Continuous <Continuous>  dextrose 50% Injectable 12.5 Gram(s) IV Push once  dextrose 50% Injectable 25 Gram(s) IV Push once  dextrose 50% Injectable 25 Gram(s) IV Push once  furosemide   Injectable 40 milliGRAM(s) IV Push two times a day  insulin glargine Injectable (LANTUS) 64 Unit(s) SubCutaneous at bedtime  insulin lispro (HumaLOG) corrective regimen sliding scale   SubCutaneous three times a day before meals  insulin lispro Injectable (HumaLOG) 21 Unit(s) SubCutaneous three times a day before meals  lisinopril 2.5 milliGRAM(s) Oral daily  metoprolol tartrate 25 milliGRAM(s) Oral every 12 hours  pantoprazole    Tablet 40 milliGRAM(s) Oral before breakfast  sodium chloride 0.9% lock flush 3 milliLiter(s) IV Push every 8 hours      LABS:	 	                          9.6    9.39  )-----------( 330      ( 07 Jul 2020 12:46 )             33.7     Hemoglobin: 9.6 g/dL (07-07 @ 12:46)  Hemoglobin: 8.6 g/dL (07-07 @ 06:30)  Hemoglobin: 8.0 g/dL (07-06 @ 13:10)  Hemoglobin: 8.5 g/dL (07-06 @ 12:47)    07-07    139  |  103  |  21  ----------------------------<  129<H>  4.1   |  23  |  0.95    Ca    9.7      07 Jul 2020 06:30    TPro  6.9  /  Alb  4.0  /  TBili  1.2  /  DBili  x   /  AST  26  /  ALT  24  /  AlkPhos  92  07-07    Creatinine Trend: 0.95<--, 1.28<--  COAGS:       proBNP:   Lipid Profile:   HgA1c:   TSH:     PHYSICAL EXAM:  T(C): 36.7 (07-07-20 @ 11:42), Max: 36.7 (07-07-20 @ 11:42)  HR: 65 (07-07-20 @ 11:42) (65 - 73)  BP: 117/55 (07-07-20 @ 11:42) (117/55 - 155/71)  RR: 18 (07-07-20 @ 11:42) (18 - 18)  SpO2: 98% (07-07-20 @ 11:42) (98% - 100%)  Wt(kg): --  I&O's Summary    06 Jul 2020 07:01  -  07 Jul 2020 07:00  --------------------------------------------------------  IN: 200 mL / OUT: 2725 mL / NET: -2525 mL    07 Jul 2020 07:01  -  07 Jul 2020 14:09  --------------------------------------------------------  IN: 0 mL / OUT: 750 mL / NET: -750 mL      Gen: Appears well in NAD  HEENT:  (-)icterus (-)pallor  CV: N S1 S2 1/6 EREN (+)2 Pulses B/l  Resp:  Clear to auscultation B/L, normal effort  GI: (+) BS Soft, NT, ND  Lymph:  (-)Edema, (-)obvious lymphadenopathy  Ext: RFA site C/D/I, soft, no swelling/hematoma, pulses intact  Skin: Warm to touch, Normal turgor  Psych: Appropriate mood and affect      TELEMETRY: SB/SR	      ASSESSMENT/PLAN: Patient is a 59 y/o male well known to our office with PMH of HFrEF (last EF 30% on 6/19/20), HTN, DM2, HLD, CAD s/p MI in November 2005 with four-vessel bypass in 2005 at [facility], s/p PCI to LCX in 2012 at [facility] (at that time LIMA-LAD patent, PREETI- RPDA patent, but SVG-D1 and SVG-OM occluded) presents today for elective cardiac catheterization due to RESENDIZ.    - s/p cath with no intervention - recommend medical management of CAD - DAPT/statin  - Continue ACE/BB  - Patient with elevated EDP - diuresed well on IV lasix - may discharge on PO lasix 40mg BID  - WBC downtrended likely reactive - no signs of infection or fevers  - Heme/GI consults appreciated  - No further inpatient cardiac w/u needed  - D/C home today  - Patient to f/u in our office with Dr. Fraser on Wed 7/15 at 1:15 PM    Aaron Mercado PA-C  Pager: [phone] Patient denies chest pain or shortness of breath.   Review of systems otherwise (-)  	    MEDICATIONS  (STANDING):  aspirin enteric coated 81 milliGRAM(s) Oral daily  atorvastatin 80 milliGRAM(s) Oral at bedtime  clopidogrel Tablet 75 milliGRAM(s) Oral daily  cyanocobalamin 1000 MICROGram(s) Oral daily  dextrose 5%. 1000 milliLiter(s) (50 mL/Hr) IV Continuous <Continuous>  dextrose 50% Injectable 12.5 Gram(s) IV Push once  dextrose 50% Injectable 25 Gram(s) IV Push once  dextrose 50% Injectable 25 Gram(s) IV Push once  furosemide   Injectable 40 milliGRAM(s) IV Push two times a day  insulin glargine Injectable (LANTUS) 64 Unit(s) SubCutaneous at bedtime  insulin lispro (HumaLOG) corrective regimen sliding scale   SubCutaneous three times a day before meals  insulin lispro Injectable (HumaLOG) 21 Unit(s) SubCutaneous three times a day before meals  lisinopril 2.5 milliGRAM(s) Oral daily  metoprolol tartrate 25 milliGRAM(s) Oral every 12 hours  pantoprazole    Tablet 40 milliGRAM(s) Oral before breakfast  sodium chloride 0.9% lock flush 3 milliLiter(s) IV Push every 8 hours      LABS:	 	                          9.6    9.39  )-----------( 330      ( 07 Jul 2020 12:46 )             33.7     Hemoglobin: 9.6 g/dL (07-07 @ 12:46)  Hemoglobin: 8.6 g/dL (07-07 @ 06:30)  Hemoglobin: 8.0 g/dL (07-06 @ 13:10)  Hemoglobin: 8.5 g/dL (07-06 @ 12:47)    07-07    139  |  103  |  21  ----------------------------<  129<H>  4.1   |  23  |  0.95    Ca    9.7      07 Jul 2020 06:30    TPro  6.9  /  Alb  4.0  /  TBili  1.2  /  DBili  x   /  AST  26  /  ALT  24  /  AlkPhos  92  07-07    Creatinine Trend: 0.95<--, 1.28<--  COAGS:       proBNP:   Lipid Profile:   HgA1c:   TSH:     PHYSICAL EXAM:  T(C): 36.7 (07-07-20 @ 11:42), Max: 36.7 (07-07-20 @ 11:42)  HR: 65 (07-07-20 @ 11:42) (65 - 73)  BP: 117/55 (07-07-20 @ 11:42) (117/55 - 155/71)  RR: 18 (07-07-20 @ 11:42) (18 - 18)  SpO2: 98% (07-07-20 @ 11:42) (98% - 100%)  Wt(kg): --  I&O's Summary    06 Jul 2020 07:01  -  07 Jul 2020 07:00  --------------------------------------------------------  IN: 200 mL / OUT: 2725 mL / NET: -2525 mL    07 Jul 2020 07:01  -  07 Jul 2020 14:09  --------------------------------------------------------  IN: 0 mL / OUT: 750 mL / NET: -750 mL      Gen: Appears well in NAD  HEENT:  (-)icterus (-)pallor  CV: N S1 S2 1/6 EREN (+)2 Pulses B/l  Resp:  Clear to auscultation B/L, normal effort  GI: (+) BS Soft, NT, ND  Lymph:  (-)Edema, (-)obvious lymphadenopathy  Ext: RFA site C/D/I, soft, no swelling/hematoma, pulses intact  Skin: Warm to touch, Normal turgor  Psych: Appropriate mood and affect      TELEMETRY: SB/SR	      ASSESSMENT/PLAN: Patient is a 59 y/o male well known to our office with PMH of HFrEF (last EF 30% on 6/19/20), HTN, DM2, HLD, CAD s/p MI in November 2005 with four-vessel bypass in 2005 at [facility], s/p PCI to LCX in 2012 at [facility] (at that time LIMA-LAD patent, PREETI- RPDA patent, but SVG-D1 and SVG-OM occluded) presents today for elective cardiac catheterization due to RESENDIZ.    - s/p cath with no intervention - recommend medical management of CAD - DAPT/statin  - Continue ACE/BB  - Patient with elevated EDP - diuresed well on IV lasix - may discharge on PO lasix 40mg BID  - WBC downtrended likely reactive - no signs of infection or fevers  - Heme/GI consults appreciated  - EP consult appreciated  - No further inpatient cardiac w/u needed  - D/C home today  - Patient to f/u in our office with Dr. Fraser on Wed 7/15 at 1:15 PM    Aaron Mercado PA-C  Pager: [phone]

## 2020-07-07 NOTE — CONSULT NOTE ADULT - PROBLEM SELECTOR RECOMMENDATION 9
-hgb noted, decreased from previous baseline  -no overt gi bleeding  -iron studies ordered/pending  -heme input appreciated; concern for hemoglobinopathies; outpt fu   -gi ppx w/protonix qd while on asa/plavix for cad; no gi objection   -no urgent role for inpatient endoscopic eval; patient has an scheduled EGD/Colonoscopy with private gastroenterologist, Dr. Berumen on 7/15  -high fiber diet   -no gi objection to dc plans per primary team

## 2020-07-07 NOTE — PROGRESS NOTE ADULT - ATTENDING COMMENTS
Patient seen and examined.  Agree with above.   Volume status improved  Pt. diuresed well and is now euvolemic  Change to PO lasix  Outpatient GI workup per GI  PR home    Renata Harvey MD

## 2020-07-07 NOTE — CONSULT NOTE ADULT - SUBJECTIVE AND OBJECTIVE BOX
HPI:  60 y.o. male admitted for elective cardiac catheterization due to RESENDIZ. The patient  denies fever, chills, recent travel, headache, dizziness, visual deficits, chest pain, shortness of breath, orthopnea, palpitations, abdominal pain, N/V/D/C, hematochezia, melena, dysuria, hematuria, PE, DVT, CVA, [place], LOC, syncope, peripheral edema. The patient was evaluated by his cardiologist, was recommended to have cardiac cath.     Cardiac cath performed 7/6. CAD w med management recommended  Seen by EP. Planning for outpt ICD    I was asked to see pt for microcytic anemia. Reports was scheduled for EGD/Colonoscopy w Dr. Lary Berumen but got hosp. Anemia is new to his knowledge. Denies bleeding. No RBPR or Melena. No fevers or night sweats No anorexia or weight loss    Denies family hx of anemia         PAST MEDICAL & SURGICAL HISTORY:  Stented coronary artery: 2013  GERD (gastroesophageal reflux disease)  HTN (hypertension)  COPD (chronic obstructive pulmonary disease)  CHF (congestive heart failure)  CAD (coronary artery disease)  DM (diabetes mellitus)  HLD (hyperlipidemia)  H/O eye surgery  S/P arthroscopic knee surgery  S/P CABG (coronary artery bypass graft): 2005      ROS:  Negative except for:    MEDICATIONS  (STANDING):  aspirin enteric coated 81 milliGRAM(s) Oral daily  atorvastatin 80 milliGRAM(s) Oral at bedtime  clopidogrel Tablet 75 milliGRAM(s) Oral daily  cyanocobalamin 1000 MICROGram(s) Oral daily  dextrose 5%. 1000 milliLiter(s) (50 mL/Hr) IV Continuous <Continuous>  dextrose 50% Injectable 12.5 Gram(s) IV Push once  dextrose 50% Injectable 25 Gram(s) IV Push once  dextrose 50% Injectable 25 Gram(s) IV Push once  furosemide   Injectable 40 milliGRAM(s) IV Push two times a day  insulin glargine Injectable (LANTUS) 64 Unit(s) SubCutaneous at bedtime  insulin lispro (HumaLOG) corrective regimen sliding scale   SubCutaneous three times a day before meals  insulin lispro Injectable (HumaLOG) 21 Unit(s) SubCutaneous three times a day before meals  lisinopril 2.5 milliGRAM(s) Oral daily  metoprolol tartrate 25 milliGRAM(s) Oral every 12 hours  pantoprazole    Tablet 40 milliGRAM(s) Oral before breakfast  sodium chloride 0.9% lock flush 3 milliLiter(s) IV Push every 8 hours    MEDICATIONS  (PRN):  dextrose 40% Gel 15 Gram(s) Oral once PRN Blood Glucose LESS THAN 70 milliGRAM(s)/deciliter  glucagon  Injectable 1 milliGRAM(s) IntraMuscular once PRN Glucose LESS THAN 70 milligrams/deciliter      Allergies    penicillin (Rash)    Intolerances        Vital Signs Last 24 Hrs  T(C): 36.5 (07 Jul 2020 05:01), Max: 36.5 (07 Jul 2020 05:01)  T(F): 97.7 (07 Jul 2020 05:01), Max: 97.7 (07 Jul 2020 05:01)  HR: 67 (07 Jul 2020 05:01) (67 - 73)  BP: 121/61 (07 Jul 2020 05:01) (121/61 - 155/71)  BP(mean): --  RR: 18 (07 Jul 2020 05:01) (18 - 18)  SpO2: 99% (07 Jul 2020 05:01) (99% - 100%)    PHYSICAL EXAM  General: adult in NAD  HEENT: clear oropharynx, anicteric sclera, pink conjunctiva  Neck: supple  CV: normal S1/S2 RRR  Lungs: positive air movement b/l ant lungs,clear to auscultation   Abdomen: soft non-tender non-distended, no hepatosplenomegaly  Ext: no clubbing cyanosis or edema  Skin: no rashes and no petechiae  Neuro: alert and oriented X 4, no focal deficits  LABS:                          8.6    11.10 )-----------( 275      ( 07 Jul 2020 06:30 )             29.7     Serial CBC's  07-07 @ 06:30  Hct-29.7 / Hgb-8.6 / Plat-275 / RBC-4.57 / WBC-11.10          Serial CBC's  07-06 @ 13:10  Hct-28.9 / Hgb-8.0 / Plat-282 / RBC-4.31 / WBC-7.92            07-07    139  |  103  |  21  ----------------------------<  129<H>  4.1   |  23  |  0.95    Ca    9.7      07 Jul 2020 06:30    TPro  6.9  /  Alb  4.0  /  TBili  1.2  /  DBili  x   /  AST  26  /  ALT  24  /  AlkPhos  92  07-07          WBC Count: 11.10 K/uL (07-07 @ 06:30)  Hemoglobin: 8.6 g/dL (07-07 @ 06:30)            RADIOLOGY & ADDITIONAL STUDIES:

## 2020-07-07 NOTE — CONSULT NOTE ADULT - PROBLEM SELECTOR RECOMMENDATION 2
-s/p cath  -gi ppx w/protonix qd while on asa/plavix for cad; no gi objection   -cont care per cardiology

## 2020-07-07 NOTE — DISCHARGE NOTE PROVIDER - HOSPITAL COURSE
Written/documented by Delilah Lopez, acting as a scribe in Dr. Ruiz's presence.    CC: Routine and med check if applicable    Established patient     Patient has her first visit today since last Epic update.  Entire past medical, surgical, and family history were reviewed in detail and much information was added to each section because of this.  Medications reviewed and many medications were removed and added, as applicable.      SUBJECTIVE  HPI:Ariana Cohen is a 42 year old female who presents today for a routine physical - without pelvic exam.    Additional concerns today are:    1.  BP elevated today and she reports she ran out of propranolol 160 mg 3 days ago.  She has been having intermittent SOB accompanied by palpitations which resolve upon cough which have been prompting her to check her BP.  This has been occurring sporadically for the past 3-4 months.  She reports similarly elevated readings to her office reading today (150/110) when she checks her BP right after these episodes.  She recalls a reading of 127/90 after laying.  Denies smoking, vaping/e cig use, or excess caffeine intake.  Family hx of arrhythmias with mom.  She notes weight gain upon switching BP med to propranolol.      2.  Patient complains of sharp, shooting pains in her L arm which have been causing tingling of her hand and fingers.  This has been ongoing for 2-3 weeks and has been waking her up at night.      3.  She is sure she had labs done to recheck lipids as instructed after her last appt but the results/orders are not appearing in the chart.  She states these were done through [facility] and she was billed for them.      4.  Patient is noted to have a cough and she admits her kids are sick and she has a sore throat as well.  This is not overly bothersome for her.      Details of last mammogram: 5 mm oval density in the R breast determined to be probably benign on diagnostic mammogram and ultrasound, 8/20/2018.  She did not  complete f/u imaging as recommended  Last pap smear:  Neg/neg, 8/2/2018  Hx of abnormal pap smear:  No     Past Medical History:   Diagnosis Date   • Daily headache 06/14/2018    migrainal component?   • HTN (hypertension)    • PIH (pregnancy induced hypertension)      Past Surgical History:   Procedure Laterality Date   • Anesth,knee arthroscopy Right     tracking issue patellar x 2 surgeries    • Appendectomy     • Cervical cerclage      for second pregnancy   • Tonsillectomy and adenoidectomy     • Tubal ligation       Social History     Tobacco Use   • Smoking status: Former Smoker     Packs/day: 0.50     Years: 20.00     Pack years: 10.00   • Smokeless tobacco: Never Used   Substance Use Topics   • Alcohol use: Yes     Comment: socially    • Drug use: Never      Social History     Patient does not qualify to have social determinant information on file (likely too young).   Social History Narrative   • Not on file      Family History   Problem Relation Age of Onset   • Arrhythmia Mother    • Hypertension Mother    • Diabetes Father    • Alcohol Abuse Father    • Hypertension Father    • Diabetes Maternal Grandmother    • Cancer, Breast Maternal Grandmother         older age   • Congestive Heart Failure Maternal Grandmother    • Heart disease Maternal Grandfather         open heart   • Cancer, Skin Maternal Grandfather    • Hyperlipidemia Other         \"whole family\"   • Patient is unaware of any medical problems Half-Brother    • Patient is unaware of any medical problems Child         premature birth       ALLERGIES:  No Known Allergies    Outpatient Medications Prior to Visit   Medication Sig Dispense Refill   • Propranolol HCl  MG CAPSULE SR 24 HR TAKE ONE CAPSULE BY MOUTH DAILY 30 capsule 0   • Cholecalciferol (VITAMIN D3) 2000 units Chew Tab Take 2,000 Units by mouth daily. Anything other than tablet, ie liquid, gummy, chew or gelcap     • neomycin-polymyxin-hydroCORTisone (CORTISPORIN) [id number]  Patient is a 59 y/o male well known to our office with PMH of HFrEF (last EF 30% on 6/19/20), HTN, DM2, HLD, CAD s/p MI in November 2005 with four-vessel bypass in 2005 at [facility], s/p PCI to LCX in 2012 at [facility] (at that time LIMA-LAD patent, PREETI- RPDA patent, but SVG-D1 and SVG-OM occluded) presents today for elective cardiac catheterization due to RESENDIZ.        - s/p cath with no intervention - recommend medical management of CAD - DAPT/statin    - Continue ACE/BB    - Patient with elevated EDP - diuresed well on IV lasix - may discharge on PO lasix 40mg BID    - WBC downtrended likely reactive - no signs of infection or fevers    - Heme/GI consults appreciated    - EP consult appreciated    - No further inpatient cardiac w/u needed    - D/C home today        EP c/s:  severe LV dysfunction and NYHA Class II CHF. Despite greater than 3 months of optimal medical therapy he continues to have NYHA Class II CHF and severe LV dysfunction (LVEF 30%). A cath did not result in revascularization. EP is now called for a primary prevention single chamber ICD.    -given the above I recommended a single chamber ICD, will arrange for ICD as outpatient         GI consulted for anemia. The patient reports this is new and he is aware from labs from his PCP. He has made an appointment with his private gastroenterologist, Dr. Berumen, for EGD/Colonoscopy which is scheduled for 7/15. Cont GI ppx w/protonix qd while on asa/plavix for cad; no gi objection     -no urgent role for inpatient endoscopic eval        Disucssed with cardiology and with patient regarding his home insulin regimen - pt to continue home insulin regimen at discharge, instructed pt to follow up with endocrinology Dr. Rain as outpatient.             Discussed case with cardiology and with GI, pt cleared for discharge home today with outpatient follow up otic suspension Place 3 Drops in both ears 2 (two) times daily as needed.     • Unable to Find Medication MELATONIN       No facility-administered medications prior to visit.        SUBJECTIVE  Review of Systems   HENT: Positive for sore throat.    Respiratory: Positive for cough.    Cardiovascular: Positive for palpitations.   Musculoskeletal:        + L arm pain   All other systems reviewed and are negative.      OBJECTIVE  Vitals:   Visit Vitals  /89   Pulse 90   Temp 98.1 °F (36.7 °C)   Resp 16   Ht 5' 9\" (1.753 m)   Wt 106.1 kg (234 lb)   LMP 11/10/2019 (Approximate)   BMI 34.56 kg/m²     Ariana's BMI is Body mass index is 34.56 kg/m².   Wt Readings from Last 4 Encounters:   12/05/19 106.1 kg (234 lb)   08/02/18 108 kg (238 lb)   06/14/18 103.4 kg (228 lb)       Relevant labs:  No visits with results within 1 Year(s) from this visit.   Latest known visit with results is:   Lab Services on 08/02/2018   Component Date Value Ref Range Status   • AP REPORT 08/02/2018 AP results*  Final       Physical Exam   Constitutional: She is oriented to person, place, and time. She appears well-developed and well-nourished. No distress.   HENT:   Head: Normocephalic and atraumatic.   Right Ear: Tympanic membrane, external ear and ear canal normal.   Left Ear: Tympanic membrane, external ear and ear canal normal.   Mouth/Throat: Oropharynx is clear and moist. No oropharyngeal exudate.   Eyes: Pupils are equal, round, and reactive to light. Conjunctivae and EOM are normal. Right eye exhibits no discharge. Left eye exhibits no discharge. No scleral icterus.   Neck: Normal range of motion. Neck supple. Spinous process tenderness (palpation of C8 induces left arm pain and tingling) present. No thyromegaly present.   Cardiovascular: Normal rate, regular rhythm and normal heart sounds. Exam reveals no gallop and no friction rub.   No murmur heard.  Pulmonary/Chest: Effort normal and breath sounds normal. No respiratory  distress. She has no wheezes. She has no rales. She exhibits no mass. Right breast exhibits no inverted nipple, no mass, no nipple discharge, no skin change and no tenderness. Left breast exhibits no inverted nipple, no mass, no nipple discharge, no skin change and no tenderness. Breasts are symmetrical.   Abdominal: Soft. She exhibits no distension and no mass. There is no tenderness. There is no rebound and no guarding. Musculoskeletal: Normal range of motion.         General: No edema.     Lymphadenopathy:     She has no cervical adenopathy.   Neurological: She is alert and oriented to person, place, and time.   Skin: Skin is warm and dry. No rash noted. She is not diaphoretic. No erythema. No pallor.   Psychiatric: She has a normal mood and affect. Her behavior is normal.   Vitals reviewed.      ASSESSMENT/PLAN  Ariana was seen today for annual exam.    Diagnoses and all orders for this visit:    Abnormal EKG  -     Cancel: ECHOCARDIOGRAM STRESS TEST W/ W/O IMAGING AGENT; Future    Palpitation  Comments:  uncontrolled  Orders:  -     ELECTROCARDIOGRAM 12-LEAD  -     TRANSTHORACIC ECHO(TTE) COMPLETE W/ W/O IMAGING AGENT; Future  -     CBC & AUTO DIFFERENTIAL; Future  -     COMPREHENSIVE MET PNL (FAST); Future  -     LIPID PANEL WITH REFLEX; Future  -     THYROID STIMULATING HORMONE REFLEX; Future  -     URINALYSIS & REFLEX MICRO WITH CULTURE IF INDICATED; Future  -     Cancel: ECHOCARDIOGRAM STRESS TEST W/ W/O IMAGING AGENT; Future    Acute cervical radiculopathy    Obesity (BMI 30-39.9)  -     ELECTROCARDIOGRAM 12-LEAD  -     TRANSTHORACIC ECHO(TTE) COMPLETE W/ W/O IMAGING AGENT; Future  -     CBC & AUTO DIFFERENTIAL; Future  -     COMPREHENSIVE MET PNL (FAST); Future  -     LIPID PANEL WITH REFLEX; Future  -     THYROID STIMULATING HORMONE REFLEX; Future  -     URINALYSIS & REFLEX MICRO WITH CULTURE IF INDICATED; Future    Hyperlipidemia, unspecified hyperlipidemia type  -     ELECTROCARDIOGRAM 12-LEAD  -      TRANSTHORACIC ECHO(TTE) COMPLETE W/ W/O IMAGING AGENT; Future  -     CBC & AUTO DIFFERENTIAL; Future  -     COMPREHENSIVE MET PNL (FAST); Future  -     LIPID PANEL WITH REFLEX; Future  -     THYROID STIMULATING HORMONE REFLEX; Future  -     URINALYSIS & REFLEX MICRO WITH CULTURE IF INDICATED; Future    Uncontrolled hypertension    Family history of arrhythmia  -     ELECTROCARDIOGRAM 12-LEAD  -     TRANSTHORACIC ECHO(TTE) COMPLETE W/ W/O IMAGING AGENT; Future  -     CBC & AUTO DIFFERENTIAL; Future  -     COMPREHENSIVE MET PNL (FAST); Future  -     LIPID PANEL WITH REFLEX; Future  -     THYROID STIMULATING HORMONE REFLEX; Future  -     URINALYSIS & REFLEX MICRO WITH CULTURE IF INDICATED; Future  -     Cancel: ECHOCARDIOGRAM STRESS TEST W/ W/O IMAGING AGENT; Future    Encounter for general adult medical examination with abnormal findings  -     CBC & AUTO DIFFERENTIAL; Future  -     COMPREHENSIVE MET PNL (FAST); Future  -     LIPID PANEL WITH REFLEX; Future  -     THYROID STIMULATING HORMONE REFLEX; Future  -     URINALYSIS & REFLEX MICRO WITH CULTURE IF INDICATED; Future    Screening for breast cancer  -     MAMMO SCREENING BILATERAL W SHWETA; Future    Refused influenza vaccine    Family history of malignant neoplasm of breast        - Advised for seasonal influenza vaccine: Patient declined.   - Fasting labs ordered today for completion soon.   Patient will find billing statement from labs that are missing from her chart and we will try to reconcile this once received.    - Screening mammogram with tomography ordered.      - EKG and ECHO ordered for further evaluation of her current complaints and given family history.  Upon review of EKG by me today as abnormal, (T inversion inferior and some lateral leads) ECHO order was changed to stress ECHO.  Possible Holter monitor as well depending on symptom persistence and these results.  Will also consider med change.   - Refill placed for propranolol 160 mg.  Advised to  request short term refill if she finds she will run out of the med prior to her appt.      - Arm pain likely c/w cervical radiculitis.  Described recurrent nature of this condition.  Recommended for sleeping on one pillow that supports natural curvature of neck, proper ergonomics while using computer or cell phone, to be mindful of good posture throughout the day, and body pillow when sleeping on side.  Recommended to ice bony areas of cervical spine and heat in the muscular areas.    - Pt should expect for pain to improve in the next few weeks.  Proceed with chiropractic adjustments as she is thinking about this.  Consider consult with pain management, imaging for further evaluation, etc if pain remains persistent or worsens.      Preventative Care Discussions:   Repeat physical exam in 1 year  Monthly self breast exams  Recommended mammograms yearly  Counseled about weight loss  Regular exercise - Recommend 30 minutes up to 5 to 7 days a week  Informed patient of immunizations recommended: Influenza  Supplemental vitamin D    Follow up in 4-6 weeks for recheck and BP check.        Scribe: Electronically signed: Delilah Lopez has scribed for Dr. Ruiz  12/5/2019   I have reviewed and edited the progress note and agree with what has been scribed. Electronically signed by: Juliette Ruiz MD

## 2021-03-10 NOTE — ED ADULT NURSE NOTE - PAIN RATING/NUMBER SCALE (0-10): ACTIVITY
Fluence Units: J/cm2 Mode: repeat paint Detail Level: Zone Treatment Number: 3 Post-Care Instructions: I reviewed with the patient in detail post-care instructions. Patient should stay away from the sun and wear sun protection until treated areas are fully healed. Treatment performed by Fabiola Leal RN. Billing: [id number] (Total area less than 250 cm2) Location #1: Hands Total Square Area In Cm2 (Optional): 128 Fluence #1 (J/Cm2 Or Mj/Cm2): 580 Consent: Written consent obtained, risks reviewed including but not limited to crusting, scabbing, blistering, scarring, darker or lighter pigmentary change, incidental hair removal, bruising, and/or incomplete removal. Spot Size: 2 x 2 cm 0

## 2021-07-02 NOTE — PROGRESS NOTE ADULT - PROBLEM SELECTOR PROBLEM 5
No
Type 2 diabetes mellitus with hyperglycemia, with long-term current use of insulin

## 2022-06-10 NOTE — ED ADULT NURSE NOTE - NS ED NURSE RECORD ANOTHER HT AND WT
,    Your STD tests were negative.    Grazyna Colon PA-C  
,    Your wet prep was normal meaning you do not have BV.  Your urinalysis was also normal.    Grazyna Colon PA-C  
No

## 2022-07-18 NOTE — DIETITIAN INITIAL EVALUATION ADULT. - NUTRITION DIAGNOSTIC TERMINOLOGY #1
Spoke to [facility]- they do not have prescription on file for [employer]

## 2022-10-06 PROBLEM — Z95.5 PRESENCE OF CORONARY ANGIOPLASTY IMPLANT AND GRAFT: Chronic | Status: ACTIVE | Noted: 2020-07-06

## 2022-10-27 ENCOUNTER — APPOINTMENT (OUTPATIENT)
Dept: ELECTROPHYSIOLOGY | Facility: CLINIC | Age: 62
End: 2022-10-27

## 2022-10-27 ENCOUNTER — APPOINTMENT (OUTPATIENT)
Dept: CARDIOLOGY | Facility: CLINIC | Age: 62
End: 2022-10-27

## 2022-10-27 ENCOUNTER — NON-APPOINTMENT (OUTPATIENT)
Age: 62
End: 2022-10-27

## 2022-10-27 VITALS
DIASTOLIC BLOOD PRESSURE: 80 MMHG | HEART RATE: 58 BPM | OXYGEN SATURATION: 98 % | HEIGHT: 67 IN | WEIGHT: 195 LBS | SYSTOLIC BLOOD PRESSURE: 154 MMHG | BODY MASS INDEX: 30.61 KG/M2

## 2022-10-27 VITALS — SYSTOLIC BLOOD PRESSURE: 158 MMHG | DIASTOLIC BLOOD PRESSURE: 82 MMHG

## 2022-10-27 VITALS — SYSTOLIC BLOOD PRESSURE: 163 MMHG | DIASTOLIC BLOOD PRESSURE: 85 MMHG

## 2022-10-27 DIAGNOSIS — K21.9 GASTRO-ESOPHAGEAL REFLUX DISEASE W/OUT ESOPHAGITIS: ICD-10-CM

## 2022-10-27 DIAGNOSIS — D64.9 ANEMIA, UNSPECIFIED: ICD-10-CM

## 2022-10-27 DIAGNOSIS — R79.89 OTHER SPECIFIED ABNORMAL FINDINGS OF BLOOD CHEMISTRY: ICD-10-CM

## 2022-10-27 DIAGNOSIS — I25.10 ATHEROSCLEROTIC HEART DISEASE OF NATIVE CORONARY ARTERY W/OUT ANGINA PECTORIS: ICD-10-CM

## 2022-10-27 DIAGNOSIS — I05.0 RHEUMATIC MITRAL STENOSIS: ICD-10-CM

## 2022-10-27 DIAGNOSIS — I50.9 HEART FAILURE, UNSPECIFIED: ICD-10-CM

## 2022-10-27 DIAGNOSIS — I51.7 CARDIOMEGALY: ICD-10-CM

## 2022-10-27 DIAGNOSIS — I51.9 HEART DISEASE, UNSPECIFIED: ICD-10-CM

## 2022-10-27 PROCEDURE — 99205 OFFICE O/P NEW HI 60 MIN: CPT

## 2022-10-27 PROCEDURE — 93000 ELECTROCARDIOGRAM COMPLETE: CPT

## 2022-10-27 PROCEDURE — 93283 PRGRMG EVAL IMPLANTABLE DFB: CPT

## 2022-10-27 RX ORDER — INSULIN LISPRO 100 [IU]/ML
100 INJECTION, SOLUTION INTRAVENOUS; SUBCUTANEOUS 3 TIMES DAILY
Refills: 0 | Status: ACTIVE | COMMUNITY

## 2022-10-27 RX ORDER — METOPROLOL TARTRATE 50 MG/1
50 TABLET, FILM COATED ORAL TWICE DAILY
Qty: 60 | Refills: 3 | Status: DISCONTINUED | COMMUNITY
End: 2022-10-27

## 2022-10-27 RX ORDER — INSULIN GLARGINE 100 [IU]/ML
100 INJECTION, SOLUTION SUBCUTANEOUS AT BEDTIME
Refills: 0 | Status: ACTIVE | COMMUNITY

## 2022-10-27 RX ORDER — CLOPIDOGREL BISULFATE 75 MG/1
75 TABLET, FILM COATED ORAL DAILY
Qty: 90 | Refills: 3 | Status: DISCONTINUED | COMMUNITY
Start: 2017-09-19 | End: 2022-10-27

## 2022-10-27 RX ORDER — PANTOPRAZOLE 40 MG/1
40 TABLET, DELAYED RELEASE ORAL DAILY
Qty: 30 | Refills: 0 | Status: ACTIVE | COMMUNITY

## 2022-10-27 RX ORDER — CLOPIDOGREL BISULFATE 75 MG/1
75 TABLET, FILM COATED ORAL
Qty: 90 | Refills: 0 | Status: DISCONTINUED | COMMUNITY
End: 2022-10-27

## 2022-10-27 RX ORDER — DULAGLUTIDE 1.5 MG/.5ML
1.5 INJECTION, SOLUTION SUBCUTANEOUS
Qty: 1 | Refills: 5 | Status: ACTIVE | COMMUNITY

## 2022-10-29 PROBLEM — I50.9 CHF (CONGESTIVE HEART FAILURE): Status: ACTIVE | Noted: 2022-10-27

## 2022-10-29 PROBLEM — I51.9 LEFT VENTRICULAR DYSFUNCTION: Status: ACTIVE | Noted: 2022-10-27

## 2022-10-29 NOTE — HISTORY OF PRESENT ILLNESS
[FreeTextEntry1] : 62 year-old obese man presents for initial evaluation. The patient was previously followed by an outside cardiologist but has not followed with them since 2020. Mr. Durbin has been following with his PCP. He has a history cabg in 2005 at [facility] and had coronary stent placement in 2012. He was admitted to [facility] in 2020 at which time he had a cardiac catheterization performed showng nonobstructive cad and had an ICD placed. He has not followed with cardiology since that time. The patient reports that he feels well. He denies chest discomfort, denies shortness of breath, denies dyspnea on exertion. He denies palpitations, denies lower extremity edema and denies syncope or pre-syncope. He checks his blood pressure daily but does not record the values.\par \par ICD placed in 2020.\par s/p cabg in 2005 at [facility]/PCIs 2012\par \par Has not seen by cardiologist in 2 years.\par \par Feeling well. No chest discomfort, no SOB, no RESENDIZ, no LE edema\par \par Checks blood pressure everyday.\par \par

## 2022-10-29 NOTE — DISCUSSION/SUMMARY
[FreeTextEntry1] : 62 year-old obese man with a history of HTN, cad s/p cabg/pci with ischemic cardiomyopathy s/p ICD presents for initial evaluation.\par 1. Patient not on GDMT for heart failure. Change metoprolol tartrate to toprol xl 100 mg daily. Continue lisinopril and low dose lasix. Titrate up meds based on follow-up BPs.\par 2. Check TTE. ICD interrogation.\par 3. Check sleep study to evaluate for YUDELKA.\par 4. Change atorvastatin to rosuvastatin.\par 5. Discontinue plavix now several years post-PCI. Continue daily aspirin\par 6. Follow-up in 1 month

## 2022-10-29 NOTE — CARDIOLOGY SUMMARY
[de-identified] : 10/27/2022 Electronic atrial pacemaker, left axis, left anterior fascicular block, LAE, nonspecific T-wave abnormality

## 2022-10-29 NOTE — PHYSICAL EXAM
[Well Developed] : well developed [Well Nourished] : well nourished [No Acute Distress] : no acute distress [Normal Conjunctiva] : normal conjunctiva [Normal Venous Pressure] : normal venous pressure [No Carotid Bruit] : no carotid bruit [Normal S1, S2] : normal S1, S2 [No Murmur] : no murmur [No Rub] : no rub [No Gallop] : no gallop [Clear Lung Fields] : clear lung fields [Good Air Entry] : good air entry [No Respiratory Distress] : no respiratory distress  [Soft] : abdomen soft [No Masses/organomegaly] : no masses/organomegaly [Non Tender] : non-tender [Normal Bowel Sounds] : normal bowel sounds [Normal Gait] : normal gait [No Edema] : no edema [No Cyanosis] : no cyanosis [No Clubbing] : no clubbing [No Varicosities] : no varicosities [No Rash] : no rash [No Skin Lesions] : no skin lesions [Moves all extremities] : moves all extremities [No Focal Deficits] : no focal deficits [Normal Speech] : normal speech [Normal memory] : normal memory [Alert and Oriented] : alert and oriented

## 2022-11-11 ENCOUNTER — APPOINTMENT (OUTPATIENT)
Dept: CARDIOLOGY | Facility: CLINIC | Age: 62
End: 2022-11-11

## 2023-06-13 ENCOUNTER — APPOINTMENT (OUTPATIENT)
Dept: CV DIAGNOSITCS | Facility: HOSPITAL | Age: 63
End: 2023-06-13

## 2023-06-15 ENCOUNTER — APPOINTMENT (OUTPATIENT)
Dept: CARDIOLOGY | Facility: CLINIC | Age: 63
End: 2023-06-15

## 2023-08-11 NOTE — ED ADULT NURSE NOTE - NSSISCREENINGQ1_ED_A_ED
Called Radha Zuleta, left message to call back. PDMP reviewed. Tramadol #30 rx'd by Dr Mattie Ramos 7/24/23  Tramadol #11 rx'd by Dr Catrachito Monroe 7/21/23. No

## 2024-01-01 ENCOUNTER — RESULT REVIEW (OUTPATIENT)
Age: 64
End: 2024-01-01

## 2024-01-01 ENCOUNTER — LABORATORY RESULT (OUTPATIENT)
Age: 64
End: 2024-01-01

## 2024-01-01 ENCOUNTER — APPOINTMENT (OUTPATIENT)
Dept: CARDIOLOGY | Facility: HOSPITAL | Age: 64
End: 2024-01-01

## 2024-01-01 RX ORDER — SACUBITRIL AND VALSARTAN 24; 26 MG/1; MG/1
24-26 TABLET, FILM COATED ORAL TWICE DAILY
Qty: 180 | Refills: 2 | Status: ACTIVE | COMMUNITY
Start: 2024-01-01 | End: 1900-01-01

## 2024-01-31 NOTE — DISCHARGE NOTE PROVIDER - NSDCMRMEDTOKEN_GEN_ALL_CORE_FT
[Joint Pain] : joint pain [Negative] : Heme/Lymph Admelog SoloStar 100 units/mL injectable solution: 20 unit(s) injectable 3 times a day (with meals)  aspirin 81 mg oral tablet: 1 tab(s) orally once a day  atorvastatin 80 mg oral tablet: 1 tab(s) orally once a day  Basaglar KwikPen 100 units/mL subcutaneous solution: 50 unit(s) subcutaneous 2 times a day  furosemide 40 mg oral tablet: 1 tab(s) orally 2 times a day   lisinopril 2.5 mg oral tablet: 1 tab(s) orally once a day  metoprolol tartrate 25 mg oral tablet: 1 tab(s) orally every 12 hours  pantoprazole 40 mg oral delayed release tablet: 1 tab(s) orally once a day (before a meal)  Plavix 75 mg oral tablet: 1 tab(s) orally once a day  Vitamin B12 1000 mcg oral tablet: 1 tab(s) orally once a day

## 2024-04-25 ENCOUNTER — INPATIENT (INPATIENT)
Facility: HOSPITAL | Age: 64
LOS: 1 days | Discharge: HOME | End: 2024-04-27
Attending: HOSPITALIST | Admitting: HOSPITALIST
Payer: MEDICAID

## 2024-04-25 ENCOUNTER — RESULT REVIEW (OUTPATIENT)
Age: 64
End: 2024-04-25

## 2024-04-25 VITALS
SYSTOLIC BLOOD PRESSURE: 177 MMHG | OXYGEN SATURATION: 100 % | DIASTOLIC BLOOD PRESSURE: 113 MMHG | HEART RATE: 83 BPM | HEIGHT: 66 IN | WEIGHT: 190.04 LBS | RESPIRATION RATE: 22 BRPM | TEMPERATURE: 98 F

## 2024-04-25 DIAGNOSIS — Z79.899 OTHER LONG TERM (CURRENT) DRUG THERAPY: ICD-10-CM

## 2024-04-25 DIAGNOSIS — Z98.890 OTHER SPECIFIED POSTPROCEDURAL STATES: Chronic | ICD-10-CM

## 2024-04-25 DIAGNOSIS — I21.4 NON-ST ELEVATION (NSTEMI) MYOCARDIAL INFARCTION: ICD-10-CM

## 2024-04-25 DIAGNOSIS — I50.23 ACUTE ON CHRONIC SYSTOLIC (CONGESTIVE) HEART FAILURE: ICD-10-CM

## 2024-04-25 DIAGNOSIS — R07.9 CHEST PAIN, UNSPECIFIED: ICD-10-CM

## 2024-04-25 DIAGNOSIS — E11.9 TYPE 2 DIABETES MELLITUS WITHOUT COMPLICATIONS: ICD-10-CM

## 2024-04-25 DIAGNOSIS — I10 ESSENTIAL (PRIMARY) HYPERTENSION: ICD-10-CM

## 2024-04-25 DIAGNOSIS — I25.10 ATHEROSCLEROTIC HEART DISEASE OF NATIVE CORONARY ARTERY WITHOUT ANGINA PECTORIS: ICD-10-CM

## 2024-04-25 LAB
ADD ON TEST-SPECIMEN IN LAB: SIGNIFICANT CHANGE UP
ALBUMIN SERPL ELPH-MCNC: 3.6 G/DL — SIGNIFICANT CHANGE UP (ref 3.3–5)
ALBUMIN SERPL ELPH-MCNC: 4 G/DL — SIGNIFICANT CHANGE UP (ref 3.3–5)
ALBUMIN SERPL ELPH-MCNC: 4.3 G/DL — SIGNIFICANT CHANGE UP (ref 3.3–5)
ALP SERPL-CCNC: 121 U/L — HIGH (ref 40–120)
ALP SERPL-CCNC: 123 U/L — HIGH (ref 40–120)
ALP SERPL-CCNC: 138 U/L — HIGH (ref 40–120)
ALT FLD-CCNC: 39 U/L — SIGNIFICANT CHANGE UP (ref 4–41)
ALT FLD-CCNC: 42 U/L — HIGH (ref 4–41)
ALT FLD-CCNC: <5 U/L — SIGNIFICANT CHANGE UP (ref 4–41)
ANION GAP SERPL CALC-SCNC: 12 MMOL/L — SIGNIFICANT CHANGE UP (ref 7–14)
ANION GAP SERPL CALC-SCNC: 13 MMOL/L — SIGNIFICANT CHANGE UP (ref 7–14)
ANION GAP SERPL CALC-SCNC: 17 MMOL/L — HIGH (ref 7–14)
APTT BLD: 33.8 SEC — SIGNIFICANT CHANGE UP (ref 24.5–35.6)
APTT BLD: 45.9 SEC — HIGH (ref 24.5–35.6)
APTT BLD: 64.9 SEC — HIGH (ref 24.5–35.6)
AST SERPL-CCNC: 112 U/L — HIGH (ref 4–40)
AST SERPL-CCNC: 51 U/L — HIGH (ref 4–40)
AST SERPL-CCNC: 89 U/L — HIGH (ref 4–40)
BASE EXCESS BLDV CALC-SCNC: -3.3 MMOL/L — LOW (ref -2–3)
BASE EXCESS BLDV CALC-SCNC: 0.5 MMOL/L — SIGNIFICANT CHANGE UP (ref -2–3)
BASOPHILS # BLD AUTO: 0.04 K/UL — SIGNIFICANT CHANGE UP (ref 0–0.2)
BASOPHILS # BLD AUTO: 0.14 K/UL — SIGNIFICANT CHANGE UP (ref 0–0.2)
BASOPHILS NFR BLD AUTO: 0.5 % — SIGNIFICANT CHANGE UP (ref 0–2)
BASOPHILS NFR BLD AUTO: 0.9 % — SIGNIFICANT CHANGE UP (ref 0–2)
BILIRUB SERPL-MCNC: 0.8 MG/DL — SIGNIFICANT CHANGE UP (ref 0.2–1.2)
BILIRUB SERPL-MCNC: 1 MG/DL — SIGNIFICANT CHANGE UP (ref 0.2–1.2)
BILIRUB SERPL-MCNC: 1.1 MG/DL — SIGNIFICANT CHANGE UP (ref 0.2–1.2)
BLOOD GAS VENOUS COMPREHENSIVE RESULT: SIGNIFICANT CHANGE UP
BLOOD GAS VENOUS COMPREHENSIVE RESULT: SIGNIFICANT CHANGE UP
BUN SERPL-MCNC: 19 MG/DL — SIGNIFICANT CHANGE UP (ref 7–23)
BUN SERPL-MCNC: 19 MG/DL — SIGNIFICANT CHANGE UP (ref 7–23)
BUN SERPL-MCNC: 21 MG/DL — SIGNIFICANT CHANGE UP (ref 7–23)
CALCIUM SERPL-MCNC: 8.5 MG/DL — SIGNIFICANT CHANGE UP (ref 8.4–10.5)
CALCIUM SERPL-MCNC: 9.1 MG/DL — SIGNIFICANT CHANGE UP (ref 8.4–10.5)
CALCIUM SERPL-MCNC: 9.6 MG/DL — SIGNIFICANT CHANGE UP (ref 8.4–10.5)
CHLORIDE BLDV-SCNC: 100 MMOL/L — SIGNIFICANT CHANGE UP (ref 96–108)
CHLORIDE BLDV-SCNC: 99 MMOL/L — SIGNIFICANT CHANGE UP (ref 96–108)
CHLORIDE SERPL-SCNC: 100 MMOL/L — SIGNIFICANT CHANGE UP (ref 98–107)
CHLORIDE SERPL-SCNC: 100 MMOL/L — SIGNIFICANT CHANGE UP (ref 98–107)
CHLORIDE SERPL-SCNC: 97 MMOL/L — LOW (ref 98–107)
CHOLEST SERPL-MCNC: 186 MG/DL — SIGNIFICANT CHANGE UP
CK MB BLD-MCNC: 11.5 % — HIGH (ref 0–2.5)
CK MB CFR SERPL CALC: 13.9 NG/ML — HIGH
CK MB CFR SERPL CALC: 95.8 NG/ML — HIGH
CK SERPL-CCNC: 830 U/L — HIGH (ref 30–200)
CK SERPL-CCNC: SIGNIFICANT CHANGE UP U/L (ref 30–200)
CO2 BLDV-SCNC: 28.2 MMOL/L — HIGH (ref 22–26)
CO2 BLDV-SCNC: 29 MMOL/L — HIGH (ref 22–26)
CO2 SERPL-SCNC: 19 MMOL/L — LOW (ref 22–31)
CO2 SERPL-SCNC: 23 MMOL/L — SIGNIFICANT CHANGE UP (ref 22–31)
CO2 SERPL-SCNC: 28 MMOL/L — SIGNIFICANT CHANGE UP (ref 22–31)
CREAT SERPL-MCNC: 0.7 MG/DL — SIGNIFICANT CHANGE UP (ref 0.5–1.3)
CREAT SERPL-MCNC: 0.84 MG/DL — SIGNIFICANT CHANGE UP (ref 0.5–1.3)
CREAT SERPL-MCNC: 0.89 MG/DL — SIGNIFICANT CHANGE UP (ref 0.5–1.3)
EGFR: 104 ML/MIN/1.73M2 — SIGNIFICANT CHANGE UP
EGFR: 96 ML/MIN/1.73M2 — SIGNIFICANT CHANGE UP
EGFR: 98 ML/MIN/1.73M2 — SIGNIFICANT CHANGE UP
EOSINOPHIL # BLD AUTO: 0 K/UL — SIGNIFICANT CHANGE UP (ref 0–0.5)
EOSINOPHIL # BLD AUTO: 0.13 K/UL — SIGNIFICANT CHANGE UP (ref 0–0.5)
EOSINOPHIL NFR BLD AUTO: 0 % — SIGNIFICANT CHANGE UP (ref 0–6)
EOSINOPHIL NFR BLD AUTO: 1.5 % — SIGNIFICANT CHANGE UP (ref 0–6)
FLUAV AG NPH QL: SIGNIFICANT CHANGE UP
FLUBV AG NPH QL: SIGNIFICANT CHANGE UP
GAS PNL BLDV: 127 MMOL/L — LOW (ref 136–145)
GAS PNL BLDV: 131 MMOL/L — LOW (ref 136–145)
GAS PNL BLDV: SIGNIFICANT CHANGE UP
GLUCOSE BLDC GLUCOMTR-MCNC: 196 MG/DL — HIGH (ref 70–99)
GLUCOSE BLDC GLUCOMTR-MCNC: 224 MG/DL — HIGH (ref 70–99)
GLUCOSE BLDC GLUCOMTR-MCNC: 294 MG/DL — HIGH (ref 70–99)
GLUCOSE BLDC GLUCOMTR-MCNC: 349 MG/DL — HIGH (ref 70–99)
GLUCOSE BLDC GLUCOMTR-MCNC: 426 MG/DL — HIGH (ref 70–99)
GLUCOSE BLDC GLUCOMTR-MCNC: 457 MG/DL — CRITICAL HIGH (ref 70–99)
GLUCOSE BLDV-MCNC: 376 MG/DL — HIGH (ref 70–99)
GLUCOSE BLDV-MCNC: 427 MG/DL — HIGH (ref 70–99)
GLUCOSE SERPL-MCNC: 214 MG/DL — HIGH (ref 70–99)
GLUCOSE SERPL-MCNC: 355 MG/DL — HIGH (ref 70–99)
GLUCOSE SERPL-MCNC: 387 MG/DL — HIGH (ref 70–99)
HCO3 BLDV-SCNC: 26 MMOL/L — SIGNIFICANT CHANGE UP (ref 22–29)
HCO3 BLDV-SCNC: 27 MMOL/L — SIGNIFICANT CHANGE UP (ref 22–29)
HCT VFR BLD CALC: 45.7 % — SIGNIFICANT CHANGE UP (ref 39–50)
HCT VFR BLD CALC: 46.9 % — SIGNIFICANT CHANGE UP (ref 39–50)
HCT VFR BLD CALC: 49.9 % — SIGNIFICANT CHANGE UP (ref 39–50)
HCT VFR BLDA CALC: 44 % — SIGNIFICANT CHANGE UP (ref 39–51)
HCT VFR BLDA CALC: 51 % — SIGNIFICANT CHANGE UP (ref 39–51)
HDLC SERPL-MCNC: 39 MG/DL — LOW
HGB BLD CALC-MCNC: 14.5 G/DL — SIGNIFICANT CHANGE UP (ref 12.6–17.4)
HGB BLD CALC-MCNC: 16.9 G/DL — SIGNIFICANT CHANGE UP (ref 12.6–17.4)
HGB BLD-MCNC: 15.2 G/DL — SIGNIFICANT CHANGE UP (ref 13–17)
HGB BLD-MCNC: 15.4 G/DL — SIGNIFICANT CHANGE UP (ref 13–17)
HGB BLD-MCNC: 17 G/DL — SIGNIFICANT CHANGE UP (ref 13–17)
IANC: 5.12 K/UL — SIGNIFICANT CHANGE UP (ref 1.8–7.4)
IANC: 7.92 K/UL — HIGH (ref 1.8–7.4)
IMM GRANULOCYTES NFR BLD AUTO: 0.2 % — SIGNIFICANT CHANGE UP (ref 0–0.9)
INR BLD: 0.99 RATIO — SIGNIFICANT CHANGE UP (ref 0.85–1.18)
LACTATE BLDV-MCNC: 1.9 MMOL/L — SIGNIFICANT CHANGE UP (ref 0.5–2)
LACTATE BLDV-MCNC: 3.5 MMOL/L — HIGH (ref 0.5–2)
LIDOCAIN IGE QN: 74 U/L — HIGH (ref 7–60)
LIPID PNL WITH DIRECT LDL SERPL: 113 MG/DL — HIGH
LYMPHOCYTES # BLD AUTO: 2.23 K/UL — SIGNIFICANT CHANGE UP (ref 1–3.3)
LYMPHOCYTES # BLD AUTO: 21.8 % — SIGNIFICANT CHANGE UP (ref 13–44)
LYMPHOCYTES # BLD AUTO: 26.4 % — SIGNIFICANT CHANGE UP (ref 13–44)
LYMPHOCYTES # BLD AUTO: 3.3 K/UL — SIGNIFICANT CHANGE UP (ref 1–3.3)
MAGNESIUM SERPL-MCNC: 1.6 MG/DL — SIGNIFICANT CHANGE UP (ref 1.6–2.6)
MAGNESIUM SERPL-MCNC: 1.8 MG/DL — SIGNIFICANT CHANGE UP (ref 1.6–2.6)
MCHC RBC-ENTMCNC: 28.8 PG — SIGNIFICANT CHANGE UP (ref 27–34)
MCHC RBC-ENTMCNC: 29.1 PG — SIGNIFICANT CHANGE UP (ref 27–34)
MCHC RBC-ENTMCNC: 29.8 PG — SIGNIFICANT CHANGE UP (ref 27–34)
MCHC RBC-ENTMCNC: 32.8 GM/DL — SIGNIFICANT CHANGE UP (ref 32–36)
MCHC RBC-ENTMCNC: 33.3 GM/DL — SIGNIFICANT CHANGE UP (ref 32–36)
MCHC RBC-ENTMCNC: 34.1 GM/DL — SIGNIFICANT CHANGE UP (ref 32–36)
MCV RBC AUTO: 87.4 FL — SIGNIFICANT CHANGE UP (ref 80–100)
MCV RBC AUTO: 87.4 FL — SIGNIFICANT CHANGE UP (ref 80–100)
MCV RBC AUTO: 87.7 FL — SIGNIFICANT CHANGE UP (ref 80–100)
MONOCYTES # BLD AUTO: 0.9 K/UL — SIGNIFICANT CHANGE UP (ref 0–0.9)
MONOCYTES # BLD AUTO: 2.07 K/UL — HIGH (ref 0–0.9)
MONOCYTES NFR BLD AUTO: 10.7 % — SIGNIFICANT CHANGE UP (ref 2–14)
MONOCYTES NFR BLD AUTO: 13.7 % — SIGNIFICANT CHANGE UP (ref 2–14)
NEUTROPHILS # BLD AUTO: 5.12 K/UL — SIGNIFICANT CHANGE UP (ref 1.8–7.4)
NEUTROPHILS # BLD AUTO: 7.7 K/UL — HIGH (ref 1.8–7.4)
NEUTROPHILS NFR BLD AUTO: 50 % — SIGNIFICANT CHANGE UP (ref 43–77)
NEUTROPHILS NFR BLD AUTO: 60.7 % — SIGNIFICANT CHANGE UP (ref 43–77)
NON HDL CHOLESTEROL: 147 MG/DL — HIGH
NRBC # BLD: 0 /100 WBCS — SIGNIFICANT CHANGE UP (ref 0–0)
NRBC # BLD: 0 /100 WBCS — SIGNIFICANT CHANGE UP (ref 0–0)
NRBC # FLD: 0 K/UL — SIGNIFICANT CHANGE UP (ref 0–0)
NRBC # FLD: 0 K/UL — SIGNIFICANT CHANGE UP (ref 0–0)
NT-PROBNP SERPL-SCNC: 1128 PG/ML — HIGH
PCO2 BLDV: 52 MMHG — SIGNIFICANT CHANGE UP (ref 42–55)
PCO2 BLDV: 64 MMHG — HIGH (ref 42–55)
PH BLDV: 7.22 — LOW (ref 7.32–7.43)
PH BLDV: 7.33 — SIGNIFICANT CHANGE UP (ref 7.32–7.43)
PHOSPHATE SERPL-MCNC: 3.2 MG/DL — SIGNIFICANT CHANGE UP (ref 2.5–4.5)
PLATELET # BLD AUTO: 141 K/UL — LOW (ref 150–400)
PLATELET # BLD AUTO: 146 K/UL — LOW (ref 150–400)
PLATELET # BLD AUTO: 176 K/UL — SIGNIFICANT CHANGE UP (ref 150–400)
PO2 BLDV: 51 MMHG — HIGH (ref 25–45)
PO2 BLDV: 72 MMHG — HIGH (ref 25–45)
POTASSIUM BLDV-SCNC: 10.3 MMOL/L — CRITICAL HIGH (ref 3.5–5.1)
POTASSIUM BLDV-SCNC: 5.5 MMOL/L — HIGH (ref 3.5–5.1)
POTASSIUM SERPL-MCNC: 3.8 MMOL/L — SIGNIFICANT CHANGE UP (ref 3.5–5.3)
POTASSIUM SERPL-MCNC: 4.8 MMOL/L — SIGNIFICANT CHANGE UP (ref 3.5–5.3)
POTASSIUM SERPL-MCNC: SIGNIFICANT CHANGE UP MMOL/L (ref 3.5–5.3)
POTASSIUM SERPL-SCNC: 3.8 MMOL/L — SIGNIFICANT CHANGE UP (ref 3.5–5.3)
POTASSIUM SERPL-SCNC: 4.8 MMOL/L — SIGNIFICANT CHANGE UP (ref 3.5–5.3)
POTASSIUM SERPL-SCNC: SIGNIFICANT CHANGE UP MMOL/L (ref 3.5–5.3)
PROT SERPL-MCNC: 6.7 G/DL — SIGNIFICANT CHANGE UP (ref 6–8.3)
PROT SERPL-MCNC: 7.1 G/DL — SIGNIFICANT CHANGE UP (ref 6–8.3)
PROT SERPL-MCNC: SIGNIFICANT CHANGE UP G/DL (ref 6–8.3)
PROTHROM AB SERPL-ACNC: 11.2 SEC — SIGNIFICANT CHANGE UP (ref 9.5–13)
RBC # BLD: 5.23 M/UL — SIGNIFICANT CHANGE UP (ref 4.2–5.8)
RBC # BLD: 5.35 M/UL — SIGNIFICANT CHANGE UP (ref 4.2–5.8)
RBC # BLD: 5.71 M/UL — SIGNIFICANT CHANGE UP (ref 4.2–5.8)
RBC # FLD: 13.8 % — SIGNIFICANT CHANGE UP (ref 10.3–14.5)
RBC # FLD: 13.9 % — SIGNIFICANT CHANGE UP (ref 10.3–14.5)
RBC # FLD: 14.4 % — SIGNIFICANT CHANGE UP (ref 10.3–14.5)
RSV RNA NPH QL NAA+NON-PROBE: SIGNIFICANT CHANGE UP
SAO2 % BLDV: 76.8 % — SIGNIFICANT CHANGE UP (ref 67–88)
SAO2 % BLDV: 93.6 % — HIGH (ref 67–88)
SARS-COV-2 RNA SPEC QL NAA+PROBE: SIGNIFICANT CHANGE UP
SODIUM SERPL-SCNC: 133 MMOL/L — LOW (ref 135–145)
SODIUM SERPL-SCNC: 136 MMOL/L — SIGNIFICANT CHANGE UP (ref 135–145)
SODIUM SERPL-SCNC: 140 MMOL/L — SIGNIFICANT CHANGE UP (ref 135–145)
TRIGL SERPL-MCNC: 168 MG/DL — HIGH
TROPONIN T, HIGH SENSITIVITY RESULT: 1142 NG/L — CRITICAL HIGH
TROPONIN T, HIGH SENSITIVITY RESULT: 1306 NG/L — CRITICAL HIGH
TROPONIN T, HIGH SENSITIVITY RESULT: 214 NG/L — CRITICAL HIGH
TROPONIN T, HIGH SENSITIVITY RESULT: 426 NG/L — CRITICAL HIGH
TROPONIN T, HIGH SENSITIVITY RESULT: 662 NG/L — CRITICAL HIGH
TSH SERPL-MCNC: 2.55 UIU/ML — SIGNIFICANT CHANGE UP (ref 0.27–4.2)
WBC # BLD: 15.12 K/UL — HIGH (ref 3.8–10.5)
WBC # BLD: 8.44 K/UL — SIGNIFICANT CHANGE UP (ref 3.8–10.5)
WBC # BLD: 9.14 K/UL — SIGNIFICANT CHANGE UP (ref 3.8–10.5)
WBC # FLD AUTO: 15.12 K/UL — HIGH (ref 3.8–10.5)
WBC # FLD AUTO: 8.44 K/UL — SIGNIFICANT CHANGE UP (ref 3.8–10.5)
WBC # FLD AUTO: 9.14 K/UL — SIGNIFICANT CHANGE UP (ref 3.8–10.5)

## 2024-04-25 PROCEDURE — 99223 1ST HOSP IP/OBS HIGH 75: CPT

## 2024-04-25 PROCEDURE — 99291 CRITICAL CARE FIRST HOUR: CPT

## 2024-04-25 PROCEDURE — 93010 ELECTROCARDIOGRAM REPORT: CPT | Mod: 76

## 2024-04-25 PROCEDURE — 71045 X-RAY EXAM CHEST 1 VIEW: CPT | Mod: 26

## 2024-04-25 PROCEDURE — 99222 1ST HOSP IP/OBS MODERATE 55: CPT | Mod: GC

## 2024-04-25 RX ORDER — DEXTROSE 50 % IN WATER 50 %
25 SYRINGE (ML) INTRAVENOUS ONCE
Refills: 0 | Status: DISCONTINUED | OUTPATIENT
Start: 2024-04-25 | End: 2024-04-27

## 2024-04-25 RX ORDER — METFORMIN HYDROCHLORIDE 850 MG/1
1 TABLET ORAL
Refills: 0 | DISCHARGE

## 2024-04-25 RX ORDER — NITROGLYCERIN 6.5 MG
200 CAPSULE, EXTENDED RELEASE ORAL
Qty: 50 | Refills: 0 | Status: DISCONTINUED | OUTPATIENT
Start: 2024-04-25 | End: 2024-04-25

## 2024-04-25 RX ORDER — CLOPIDOGREL BISULFATE 75 MG/1
300 TABLET, FILM COATED ORAL ONCE
Refills: 0 | Status: COMPLETED | OUTPATIENT
Start: 2024-04-25 | End: 2024-04-25

## 2024-04-25 RX ORDER — ACETAMINOPHEN 500 MG
650 TABLET ORAL EVERY 6 HOURS
Refills: 0 | Status: DISCONTINUED | OUTPATIENT
Start: 2024-04-25 | End: 2024-04-27

## 2024-04-25 RX ORDER — SODIUM CHLORIDE 9 MG/ML
1000 INJECTION, SOLUTION INTRAVENOUS
Refills: 0 | Status: DISCONTINUED | OUTPATIENT
Start: 2024-04-25 | End: 2024-04-27

## 2024-04-25 RX ORDER — AZTREONAM 2 G
1000 VIAL (EA) INJECTION ONCE
Refills: 0 | Status: COMPLETED | OUTPATIENT
Start: 2024-04-25 | End: 2024-04-25

## 2024-04-25 RX ORDER — GLUCAGON INJECTION, SOLUTION 0.5 MG/.1ML
1 INJECTION, SOLUTION SUBCUTANEOUS ONCE
Refills: 0 | Status: DISCONTINUED | OUTPATIENT
Start: 2024-04-25 | End: 2024-04-27

## 2024-04-25 RX ORDER — ATORVASTATIN CALCIUM 80 MG/1
1 TABLET, FILM COATED ORAL
Qty: 0 | Refills: 0 | DISCHARGE

## 2024-04-25 RX ORDER — INSULIN GLARGINE 100 [IU]/ML
6 INJECTION, SOLUTION SUBCUTANEOUS ONCE
Refills: 0 | Status: COMPLETED | OUTPATIENT
Start: 2024-04-25 | End: 2024-04-25

## 2024-04-25 RX ORDER — FUROSEMIDE 40 MG
40 TABLET ORAL
Refills: 0 | Status: DISCONTINUED | OUTPATIENT
Start: 2024-04-25 | End: 2024-04-27

## 2024-04-25 RX ORDER — HEPARIN SODIUM 5000 [USP'U]/ML
INJECTION INTRAVENOUS; SUBCUTANEOUS
Qty: 25000 | Refills: 0 | Status: DISCONTINUED | OUTPATIENT
Start: 2024-04-25 | End: 2024-04-26

## 2024-04-25 RX ORDER — DEXTROSE 50 % IN WATER 50 %
15 SYRINGE (ML) INTRAVENOUS ONCE
Refills: 0 | Status: DISCONTINUED | OUTPATIENT
Start: 2024-04-25 | End: 2024-04-27

## 2024-04-25 RX ORDER — PANTOPRAZOLE SODIUM 20 MG/1
40 TABLET, DELAYED RELEASE ORAL
Refills: 0 | Status: DISCONTINUED | OUTPATIENT
Start: 2024-04-25 | End: 2024-04-27

## 2024-04-25 RX ORDER — INSULIN LISPRO 100/ML
8 VIAL (ML) SUBCUTANEOUS ONCE
Refills: 0 | Status: COMPLETED | OUTPATIENT
Start: 2024-04-25 | End: 2024-04-25

## 2024-04-25 RX ORDER — FUROSEMIDE 40 MG
40 TABLET ORAL ONCE
Refills: 0 | Status: COMPLETED | OUTPATIENT
Start: 2024-04-25 | End: 2024-04-25

## 2024-04-25 RX ORDER — ASPIRIN/CALCIUM CARB/MAGNESIUM 324 MG
324 TABLET ORAL ONCE
Refills: 0 | Status: COMPLETED | OUTPATIENT
Start: 2024-04-25 | End: 2024-04-25

## 2024-04-25 RX ORDER — PREGABALIN 225 MG/1
1000 CAPSULE ORAL DAILY
Refills: 0 | Status: DISCONTINUED | OUTPATIENT
Start: 2024-04-25 | End: 2024-04-27

## 2024-04-25 RX ORDER — LORATADINE 10 MG/1
1 TABLET ORAL
Refills: 0 | DISCHARGE

## 2024-04-25 RX ORDER — INSULIN LISPRO 100/ML
VIAL (ML) SUBCUTANEOUS
Refills: 0 | Status: DISCONTINUED | OUTPATIENT
Start: 2024-04-25 | End: 2024-04-26

## 2024-04-25 RX ORDER — ATORVASTATIN CALCIUM 80 MG/1
80 TABLET, FILM COATED ORAL AT BEDTIME
Refills: 0 | Status: DISCONTINUED | OUTPATIENT
Start: 2024-04-25 | End: 2024-04-27

## 2024-04-25 RX ORDER — HEPARIN SODIUM 5000 [USP'U]/ML
5300 INJECTION INTRAVENOUS; SUBCUTANEOUS EVERY 6 HOURS
Refills: 0 | Status: DISCONTINUED | OUTPATIENT
Start: 2024-04-25 | End: 2024-04-26

## 2024-04-25 RX ORDER — INSULIN LISPRO 100/ML
VIAL (ML) SUBCUTANEOUS AT BEDTIME
Refills: 0 | Status: DISCONTINUED | OUTPATIENT
Start: 2024-04-25 | End: 2024-04-26

## 2024-04-25 RX ORDER — LOSARTAN POTASSIUM 100 MG/1
50 TABLET, FILM COATED ORAL DAILY
Refills: 0 | Status: DISCONTINUED | OUTPATIENT
Start: 2024-04-25 | End: 2024-04-27

## 2024-04-25 RX ORDER — PREGABALIN 225 MG/1
1 CAPSULE ORAL
Qty: 0 | Refills: 0 | DISCHARGE

## 2024-04-25 RX ORDER — SACUBITRIL AND VALSARTAN 24; 26 MG/1; MG/1
1 TABLET, FILM COATED ORAL
Refills: 0 | Status: DISCONTINUED | OUTPATIENT
Start: 2024-04-25 | End: 2024-04-25

## 2024-04-25 RX ORDER — LORATADINE 10 MG/1
10 TABLET ORAL DAILY
Refills: 0 | Status: DISCONTINUED | OUTPATIENT
Start: 2024-04-25 | End: 2024-04-27

## 2024-04-25 RX ORDER — DAPAGLIFLOZIN 10 MG/1
10 TABLET, FILM COATED ORAL DAILY
Refills: 0 | Status: DISCONTINUED | OUTPATIENT
Start: 2024-04-25 | End: 2024-04-26

## 2024-04-25 RX ORDER — CLOPIDOGREL BISULFATE 75 MG/1
1 TABLET, FILM COATED ORAL
Qty: 0 | Refills: 0 | DISCHARGE

## 2024-04-25 RX ORDER — HEPARIN SODIUM 5000 [USP'U]/ML
5000 INJECTION INTRAVENOUS; SUBCUTANEOUS ONCE
Refills: 0 | Status: COMPLETED | OUTPATIENT
Start: 2024-04-25 | End: 2024-04-25

## 2024-04-25 RX ORDER — INSULIN GLARGINE 100 [IU]/ML
50 INJECTION, SOLUTION SUBCUTANEOUS
Qty: 0 | Refills: 0 | DISCHARGE

## 2024-04-25 RX ORDER — INSULIN LISPRO 100/ML
16 VIAL (ML) SUBCUTANEOUS
Refills: 0 | Status: DISCONTINUED | OUTPATIENT
Start: 2024-04-25 | End: 2024-04-27

## 2024-04-25 RX ORDER — DEXTROSE 50 % IN WATER 50 %
12.5 SYRINGE (ML) INTRAVENOUS ONCE
Refills: 0 | Status: DISCONTINUED | OUTPATIENT
Start: 2024-04-25 | End: 2024-04-27

## 2024-04-25 RX ORDER — DAPAGLIFLOZIN 10 MG/1
1 TABLET, FILM COATED ORAL
Refills: 0 | DISCHARGE

## 2024-04-25 RX ORDER — ERGOCALCIFEROL 1.25 MG/1
0 CAPSULE ORAL
Refills: 0 | DISCHARGE

## 2024-04-25 RX ORDER — METOPROLOL TARTRATE 50 MG
25 TABLET ORAL
Refills: 0 | Status: DISCONTINUED | OUTPATIENT
Start: 2024-04-25 | End: 2024-04-25

## 2024-04-25 RX ORDER — DEXTROSE 10 % IN WATER 10 %
125 INTRAVENOUS SOLUTION INTRAVENOUS ONCE
Refills: 0 | Status: DISCONTINUED | OUTPATIENT
Start: 2024-04-25 | End: 2024-04-27

## 2024-04-25 RX ORDER — CLOPIDOGREL BISULFATE 75 MG/1
75 TABLET, FILM COATED ORAL DAILY
Refills: 0 | Status: DISCONTINUED | OUTPATIENT
Start: 2024-04-25 | End: 2024-04-27

## 2024-04-25 RX ORDER — METOPROLOL TARTRATE 50 MG
50 TABLET ORAL DAILY
Refills: 0 | Status: DISCONTINUED | OUTPATIENT
Start: 2024-04-25 | End: 2024-04-25

## 2024-04-25 RX ORDER — NITROGLYCERIN 6.5 MG
1 CAPSULE, EXTENDED RELEASE ORAL ONCE
Refills: 0 | Status: COMPLETED | OUTPATIENT
Start: 2024-04-25 | End: 2024-04-25

## 2024-04-25 RX ORDER — METOPROLOL TARTRATE 50 MG
50 TABLET ORAL
Refills: 0 | Status: DISCONTINUED | OUTPATIENT
Start: 2024-04-26 | End: 2024-04-26

## 2024-04-25 RX ORDER — INSULIN GLARGINE 100 [IU]/ML
48 INJECTION, SOLUTION SUBCUTANEOUS AT BEDTIME
Refills: 0 | Status: DISCONTINUED | OUTPATIENT
Start: 2024-04-25 | End: 2024-04-27

## 2024-04-25 RX ORDER — ASPIRIN/CALCIUM CARB/MAGNESIUM 324 MG
81 TABLET ORAL DAILY
Refills: 0 | Status: DISCONTINUED | OUTPATIENT
Start: 2024-04-25 | End: 2024-04-26

## 2024-04-25 RX ADMIN — HEPARIN SODIUM 1200 UNIT(S)/HR: 5000 INJECTION INTRAVENOUS; SUBCUTANEOUS at 19:24

## 2024-04-25 RX ADMIN — Medication 1 INCH(S): at 01:15

## 2024-04-25 RX ADMIN — Medication 40 MILLIGRAM(S): at 13:09

## 2024-04-25 RX ADMIN — HEPARIN SODIUM 1200 UNIT(S)/HR: 5000 INJECTION INTRAVENOUS; SUBCUTANEOUS at 19:22

## 2024-04-25 RX ADMIN — Medication 60 MICROGRAM(S)/MIN: at 01:05

## 2024-04-25 RX ADMIN — Medication 40 MILLIGRAM(S): at 03:08

## 2024-04-25 RX ADMIN — CLOPIDOGREL BISULFATE 75 MILLIGRAM(S): 75 TABLET, FILM COATED ORAL at 11:50

## 2024-04-25 RX ADMIN — ATORVASTATIN CALCIUM 80 MILLIGRAM(S): 80 TABLET, FILM COATED ORAL at 22:58

## 2024-04-25 RX ADMIN — Medication 1: at 13:10

## 2024-04-25 RX ADMIN — Medication 81 MILLIGRAM(S): at 11:50

## 2024-04-25 RX ADMIN — Medication 6: at 17:06

## 2024-04-25 RX ADMIN — Medication 50 MILLIGRAM(S): at 06:24

## 2024-04-25 RX ADMIN — INSULIN GLARGINE 6 UNIT(S): 100 INJECTION, SOLUTION SUBCUTANEOUS at 06:18

## 2024-04-25 RX ADMIN — HEPARIN SODIUM 5000 UNIT(S): 5000 INJECTION INTRAVENOUS; SUBCUTANEOUS at 05:18

## 2024-04-25 RX ADMIN — HEPARIN SODIUM 1200 UNIT(S)/HR: 5000 INJECTION INTRAVENOUS; SUBCUTANEOUS at 12:22

## 2024-04-25 RX ADMIN — Medication 2: at 22:59

## 2024-04-25 RX ADMIN — Medication 8 UNIT(S): at 02:52

## 2024-04-25 RX ADMIN — Medication 324 MILLIGRAM(S): at 05:17

## 2024-04-25 RX ADMIN — HEPARIN SODIUM 1000 UNIT(S)/HR: 5000 INJECTION INTRAVENOUS; SUBCUTANEOUS at 05:19

## 2024-04-25 RX ADMIN — INSULIN GLARGINE 48 UNIT(S): 100 INJECTION, SOLUTION SUBCUTANEOUS at 22:57

## 2024-04-25 RX ADMIN — Medication 1 INCH(S): at 01:30

## 2024-04-25 RX ADMIN — CLOPIDOGREL BISULFATE 300 MILLIGRAM(S): 75 TABLET, FILM COATED ORAL at 05:17

## 2024-04-25 RX ADMIN — Medication 200 MICROGRAM(S)/MIN: at 01:15

## 2024-04-25 RX ADMIN — Medication 16 UNIT(S): at 17:05

## 2024-04-25 NOTE — ED PROVIDER NOTE - NSICDXPASTSURGICALHX_GEN_ALL_CORE_FT
PAST SURGICAL HISTORY:  H/O eye surgery     S/P arthroscopic knee surgery     S/P CABG (coronary artery bypass graft) 2005

## 2024-04-25 NOTE — ED PROVIDER NOTE - PHYSICAL EXAMINATION
CONSTITUTIONAL: acute distress, tachypnea, diaphoretic   SKIN: diaphoretic   HEAD: NCAT  EYES: NL inspection  NECK: Supple  CARD: RRR  RESP: crackles, wheeze, tachypnea   ABD: S/NT no R/G  EXT: no significant pitting LE edema  NEURO: Grossly unremarkable  PSYCH: Cooperative, appropriate.

## 2024-04-25 NOTE — ED PROVIDER NOTE - PROGRESS NOTE DETAILS
Qasim SILVA: Pt resting comfortably on BIPAP with improvement in resp sxs and distress.  BP improved and pt quickly titrated off nitro gtt and nitro topical also removed.  Pt seen by CCU - not a candidate for CCU at this time.  They recommend re-starting diuresis (lasix 40mg BID).  Pt is pending repeat trop - noted elevation in 1st trop, suspect due to demand ischemia in setting of hypertensive emergency, but will follow repeat and consider empiric treatment for ACS if cont significant elevation in repeat trop. Chacho PGY3: patient feels improved so will trial off bipap. Repeat EKG No acute dynamic chagnes - TWI again in I, AVL. No acute ERNIE. HR 62, ,   Maggie Corcoran (Rodriguez) PGY3 received sign out on this admitted patient from Dr. Flor. noted the repeat troponin to be continuously uptrending. called cardiology, who has no additional recommendations but will see pt this morning. rpt ecg ordered to evaluate for dynamic changes. pt is currently without active chest pain.

## 2024-04-25 NOTE — ED ADULT NURSE REASSESSMENT NOTE - PAIN RATING/NUMBER SCALE (0-10): ACTIVITY
0 (no pain/absence of nonverbal indicators of pain)
0 (no pain/absence of nonverbal indicators of pain)
1 (mild pain)
0 (no pain/absence of nonverbal indicators of pain)
0 (no pain/absence of nonverbal indicators of pain)

## 2024-04-25 NOTE — ED PROVIDER NOTE - NSICDXPASTMEDICALHX_GEN_ALL_CORE_FT
PAST MEDICAL HISTORY:  CAD (coronary artery disease)     CHF (congestive heart failure)     COPD (chronic obstructive pulmonary disease)     DM (diabetes mellitus)     GERD (gastroesophageal reflux disease)     HLD (hyperlipidemia)     HTN (hypertension)     Stented coronary artery 2013

## 2024-04-25 NOTE — H&P ADULT - PROBLEM SELECTOR PLAN 1
P/w acute onset chest pressure and severe SOB found to be HTN to 207/104 and SOB requiring Bipap. Melvin rising, last 1142. CP now resolved.  DDx: Type 1 v Type 2 in setting of decompensated HF v HTN emergency. Reports adherence to all medications aside from Lasix.   - TTE:LV globally hypokinetic w/ systolic function severely decreased w/ EF 33% w/ SWMA, IVC c/w mildly elevated RA pressure, grade 2 diastolic dysfunction   - Cont ASA, statin, plavix, metoprolol- patient reports good adherence   - Cont Heparin drip   - Trend Melvin to peak  [ ] [facility] this admission once euvolemic   - Cardiology following

## 2024-04-25 NOTE — H&P ADULT - PROBLEM SELECTOR PROBLEM 1
Normal device function with normal pacing impedance, sensing, and capture thresholds.  Acceptable battery longevity 7 years   0  5 NSVT  1 VF episode 12/19/18 - self terminated  Pt on metoprolol  Hx of VT/VF    RN called pt to check if he had any symptoms during episode - left message to call back.    NSTEMI (non-ST elevation myocardial infarction)

## 2024-04-25 NOTE — H&P ADULT - PROBLEM SELECTOR PLAN 6
Med rec completed with patient's pharmacy     Diet: DASH, CC diet   DVT ppx: Heparin drip    D/w wife at bedside

## 2024-04-25 NOTE — ED PROVIDER NOTE - ATTENDING CONTRIBUTION TO CARE
65 y/o 63 y/o M with h/o CAD s/p MI (2005), CABG v4 (2005), stents with HFrEF (EF 30% on 6/19/20), HTN, DM2 on insulin, hyperlipidemia p/w SOB.  Pt reports progressively worsening SOB beginning late last night and progressively getting worse all night.  No reported fever, cough, cp, increased leg swelling or weight changes.  He reports taking his home BP meds and diuretic "when he feels like it".  Hx and ROS otherwise limited due to pt's resp distress.    Pt sitting in stretcher, awake and alert, tripoding.  VSS  Pt is tachypneic with increased work of breathing and mild hypoxia on room air to 90%.  Lungs cta bl with diffuse rales.  Cards nl S1/S2, RRR, no MRG.  Abd soft obese ntnd.  No focal neuro deficits.      Pt presents in resp distress with e/o fluid overload, hypertensive emergency.  Pt started urgently on BIPAP, nitro topical and gtt.  Suspect acute decompensated HF.  Eval for underlying cardiac event, infection, ptx.  Plan for labs, ekg, cxr, ccu consult for bipap and nitro gtt for hypertensive emergency, admit.

## 2024-04-25 NOTE — H&P ADULT - PROBLEM SELECTOR PLAN 4
Statement Selected On home Lantus 60u Qhs + Lispro 20u w/ meals. Will reduce by 20% while hospitalized   - Lantus 48u Qhs + Lispro 16u w/ meals + SSI   - Diabetic diet   [ ] A1C

## 2024-04-25 NOTE — PROVIDER CONTACT NOTE (OTHER) - ASSESSMENT
Patient is AAOx4. Vital signs as charted. Unlabored breathing on 2L nasal cannula. Denied chest pain, SOB, numbness and tingling in upper and lower extremities. Normal sinus rhythm on telemetry monitoring. Pt hyperglycemic it FS at 457. Pt asymptomatic at this time. Pt given coverage and sliding scale insulin prior to transfer to .

## 2024-04-25 NOTE — H&P ADULT - PROBLEM SELECTOR PLAN 2
P/w severe SOB found to have P/w severe SOB requiring Bipap in the ED and now appears comfortable on 2L. Patient appears mildly overloaded- no edema noted, CXR w/ pleural effusion, BNP mildly elevated 1128.   - TTE:LV globally hypokinetic w/ systolic function severely decreased w/ EF 33% w/ SWMA, IVC c/w mildly elevated RA pressure, grade 2 diastolic dysfunction   - Cont Lasix IV 40 BID   - Cont home ASA, statin, Plavix, Metoprolol, Farxiga   - Switch from Lisinopril to Losartan and then will eventually start Entresto after washout period   - Strict I/O's  - Cardiology following

## 2024-04-25 NOTE — ED PROVIDER NOTE - OBJECTIVE STATEMENT
63 y/o male with PMH of HFrEF (last EF 30%), HTN, DM2, HLD, CAD s/p MI in November 2005 with four-vessel bypass in 2005 at [facility] here for SOB and CP. Started 9pm, pressure rapidly worsening assoc with sob & diaphoresis. No abd pain, NV. Cannot really answer further questions - requesting meds 'to breathe better' and to call his son

## 2024-04-25 NOTE — ED ADULT NURSE REASSESSMENT NOTE - NS ED NURSE REASSESS COMMENT FT1
Pt a&ox4 offers no complaints at this time. Respirations even and unlabored. NSR noted on tele. Denies chest pain, shortness of breath, or discomfort. Repeat EKG performed. Stretcher in lowest position, wheels locked, appropriate side rails in place, call bell in reach.
0100: LUCIO RN: Patient A&04, coming in complaining of SOB. Respiratory therapist at bedside, pt placed on BIPAP. pt states he feels better since being placed on BIPAP. Pt hypertensive, As per MD Tripp nitroglycerin gtt to be initiated. repeat EKG in progress, labs sent. Cardiology at bedside for evaluation. NSR on cardiac monitor, sating 100% on BIPAP. No complaints of chest pain, headache, nausea, dizziness, vomiting  fever, chills verbalized.
Pt received from night shift handoff. Pt is AxO 3. Pt respirations even and unlabored. Pt normal sinus on the tele monitor. Pt denies headaches, dizziness, n/v/d abdominal pain, chest pain, SOB, or fever like symptoms.  Plan of care ongoing.
Pt a&ox4 reports improvement in symptoms. Respirations even and unlabored. Pt trailed off bipap. Placed on 2L o2 NC. MD Flor made aware. Pt remains on cardiac monitor. NSR noted. Heparin gtt initiated.
break coverage rn. received report from CAR barrios. pt A&Ox4, vitals improved. satting 100% on bipap. no signs of tripoding or resp distress noted. resp even unlabored post placement of BIPAP. Denies chest discomfort and SOB at this time. safety maintained

## 2024-04-25 NOTE — H&P ADULT - NSHPREVIEWOFSYSTEMS_GEN_ALL_CORE
ROS:    Constitutional: [ ] fevers [ ] chills [ ] weight loss [ ] weight gain  HEENT: [ ] dry eyes [ ] eye irritation [ ] postnasal drip [ ] nasal congestion  CV: [x ] chest pain [ ] orthopnea [ ] palpitations [ ] murmur  Resp: [ ] cough [x ] shortness of breath [ ] dyspnea [ ] wheezing [ ] sputum [ ] hemoptysis  GI: [ ] nausea [ ] vomiting [ ] diarrhea [ ] constipation [ ] abd pain [ ] dysphagia   : [ ] dysuria [ ] nocturia [ ] hematuria [ ] increased urinary frequency  Musculoskeletal: [ ] back pain [ ] myalgias [ ] arthralgias [ ] fracture  Skin: [ ] rash [ ] itch  Neurological: [ ] headache [ ] dizziness [ ] syncope [ ] weakness [ ] numbness  Psychiatric: [ ] anxiety [ ] depression  Endocrine: [ x] diabetes [ ] thyroid problem  Hematologic/Lymphatic: [ ] anemia [ ] bleeding problem  Allergic/Immunologic: [ ] itchy eyes [ ] nasal discharge [ ] hives [ ] angioedema  [x ] All other systems negative  [ ] Unable to assess ROS because ________

## 2024-04-25 NOTE — H&P ADULT - HISTORY OF PRESENT ILLNESS
64 y.o. M w/ a hx of HFrEF, EF 33%, HTN, DM2, CAD s/p CABG (2005) and stent (2012) who presents with chest pressure and SOB.   Patient reports he was in his USOH until the day of presentation. At 3pm, he was resting when he noted some mild CP and SOB which resolved after two hours. Then at 10:30pm, patient noted acute onset severe 8/10 burning epigastric/chest pain associated with severe SOB which prompted calling EMS.  Patient reports he has not followed with a Cardiologist in at least 2 years, PCP has been prescribing medications. He reports adherence to all of his medications aside from Lasix which he only takes on a PRN basis- usually 1x every 1-2 weeks.   In the ED, patient was /104, he was placed on Bipap and started on a nitro drip.   Patient currently denies any CP, SOB and feels improved, no complaints.

## 2024-04-25 NOTE — CONSULT NOTE ADULT - SUBJECTIVE AND OBJECTIVE BOX
Reason for CCU Consult: New BIPAP requirement    HISTORY OF PRESENT ILLNESS:  Patient is a 64 year old male with PMHx HFrEF (last EF 30%), HTN, DM2, HLD, CAD s/p MI in November 2005 with four-vessel bypass in 2005 at [facility] here for SOB and CP.On interview, patient states he started to have 9/10 sharp midsternal chest pressure that began ~9 pm prior to [facility] ED arrival, associated with worsening SOB and diaphoresis, however on exam without active chest pain, palpitations, N/V/D/C. Patient notes improvement in symptoms with BIPAP. Patient states he takes home BP meds but really when "he feels like it." Per patient and son at bedside, patient reports having previously followed with [facility] but has not seen them in ~4 years and last reports seeing a cardiologist ~1.5 years ago. Patient states he is on Plavix 75 mg daily but no longer takes ASA, unclear as to reasoning. Patient reports following with Dr. Mojica as PCP says he sees PCP every month for followup. Denies smoking/ETOH/illicit substance use, denies recent infectious exposure/fever/chills/cough at home.    Hospital Course:  VS Afebrile 98F oral, HR 60s-80s, SBP initially 170s-200s s/p nitro paste and nitro gtt @200 mcg for ~15-30 mins then SBP dropped to 110s and nitro gtt on hold since, ~90% on room air, 100% NRB and work of breathing escalate to BIPAP 12/5/100% on exam mentating well and speaking full sentences on BIPAP, WBC 15.12, lactate 3.5-> 1.9, SCr 0.7, trop 214, Mg 1.8, CKMB 13.9, proBNP 1128, EKG with TWI in aVL and q waves in inferior leads, TWI new compared to prior EKG, RVP negative, awaiting CXR    HOME MEDICATIONS:  Furosemide 40 mg PO BID  Lisinopril 2.5 mg  Metoprolol tartrate 25 mg BID  Plavix 75 mg  Lantus and short acting insulin      Allergies  penicillin (Rash)          PAST MEDICAL & SURGICAL HISTORY:  HLD (hyperlipidemia)  DM (diabetes mellitus)  CAD (coronary artery disease)  CHF (congestive heart failure)  COPD (chronic obstructive pulmonary disease)  HTN (hypertension)  GERD (gastroesophageal reflux disease)  Stented coronary artery 2013  S/P CABG (coronary artery bypass graft) 2005  S/P arthroscopic knee surgery  H/O eye surgery          MEDICATIONS  (STANDING):  nitroglycerin  Infusion 200 MICROgram(s)/Min (60 mL/Hr) IV Continuous <Continuous>    MEDICATIONS  (PRN):      Drug Dosing Weight  Height (cm): 167.6 (25 Apr 2024 00:17)  Weight (kg): 86.2 (25 Apr 2024 00:17)  BMI (kg/m2): 30.7 (25 Apr 2024 00:17)  BSA (m2): 1.96 (25 Apr 2024 00:17)    FAMILY HISTORY:      SOCIAL HISTORY:  Smoker[ ]  Non smoker [ x ]  Alcohol [ ]      ADVANCE DIRECTIVES:    CONSTITUTIONAL: No fevers, No chills, No fatigue, No weight gain  EYES: No vision changes   ENT: No congestion, No ear pain, No sore throat.  NECK: No pain, No stiffness  RESPIRATORY: +Shortness of breath, No cough, No wheezing, No hemoptysis  CARDIOVASCULAR: +Chest pain. No palpitations, No RESENDIZ, No orthopnea, No paroxysmal nocturnal dyspnea, No pleuritic pain  GASTROINTESTINAL: No abdominal pain, No nausea, No vomiting, No hematemesis, No diarrhea No constipation. No melena  GENITOURINARY: No dysuria, No frequency, No incontinence, No hematuria  NEUROLOGICAL: No dizziness, No lightheadedness, No syncope, No LOC, No headache, No numbness or weakness  MUSCULOSKELETAL: No Edema, No joint pain, No joint swelling.  PSYCHIATRIC: No anxiety, No depression  DERMATOLOGY: No diaphoresis. No itching, No rashes, No pressure ulcers  HEME/LYMPH: No easy bruising, or bleeding gums    All other review of systems is negative unless indicated above.      PHYSICAL EXAM:  Appearance: Pt mentating well, speaking full sentences on BIPAP, no tripoding or resp distress  HEENT: Moist Mucous Membranes  Cardiovascular: Regular rate and rhythm, Normal S1 S2, No JVD, No murmurs  Respiratory: +Fine crackling on auscultation B/L +Tachypnea RR low 20s, no rales or wheezing   Gastrointestinal:  Soft, Non-tender, + BS  Neurologic: Non-focal, A&Ox3  Skin: Warm and dry, No rashes, No ecchymosis, No cyanosis  Musculoskeletal: No clubbing, No cyanosis, No edema  Vascular: Peripheral pulses palpable 2+ bilaterally  Psychiatry: Mood & affect appropriate      	    		            ICU Vital Signs Last 24 Hrs  T(C): 36.7 (25 Apr 2024 00:17), Max: 36.7 (25 Apr 2024 00:17)  T(F): 98.1 (25 Apr 2024 00:17), Max: 98.1 (25 Apr 2024 00:17)  HR: 60 (25 Apr 2024 02:05) (60 - 93)  BP: 120/73 (25 Apr 2024 02:05) (120/73 - 207/104)  BP(mean): 92 (25 Apr 2024 01:28) (92 - 105)  RR: 18 (25 Apr 2024 02:05) (18 - 25)  SpO2: 100% (25 Apr 2024 02:05) (97% - 100%)    O2 Parameters below as of 25 Apr 2024 02:05  Patient On (Oxygen Delivery Method): room air                LABS:  CBC Full  -  ( 25 Apr 2024 01:02 )  WBC Count : 15.12 K/uL  RBC Count : 5.71 M/uL  Hemoglobin : 17.0 g/dL  Hematocrit : 49.9 %  Platelet Count - Automated : 176 K/uL  Mean Cell Volume : 87.4 fL  Mean Cell Hemoglobin : 29.8 pg  Mean Cell Hemoglobin Concentration : 34.1 gm/dL  Auto Neutrophil # : 7.70 K/uL  Auto Lymphocyte # : 3.30 K/uL  Auto Monocyte # : 2.07 K/uL  Auto Eosinophil # : 0.00 K/uL  Auto Basophil # : 0.14 K/uL  Auto Neutrophil % : 50.0 %  Auto Lymphocyte % : 21.8 %  Auto Monocyte % : 13.7 %  Auto Eosinophil % : 0.0 %  Auto Basophil % : 0.9 %    04-25    133<L>  |  97<L>  |  19  ----------------------------<  355<H>  TNP   |  19<L>  |  0.70    Ca    9.1      25 Apr 2024 01:02  Mg     1.80     04-25    TPro  TNP  /  Alb  4.3  /  TBili  1.0  /  DBili  x   /  AST  112<H>  /  ALT  SeeComment SPECIMEN GROSSLY HEMOLYZED.  CORRECTION NOTIFIED TO DR. SONIA, M  /  AlkPhos  138<H>  04-25    CARDIAC MARKERS ( 25 Apr 2024 01:02 )  x     / x     / TNP U/L / x     / 13.9 ng/mL      CAPILLARY BLOOD GLUCOSE      POCT Blood Glucose.: 380 mg/dL (25 Apr 2024 02:52)    PT/INR - ( 25 Apr 2024 01:02 )   PT: 11.2 sec;   INR: 0.99 ratio         PTT - ( 25 Apr 2024 01:02 )  PTT:33.8 sec  Urinalysis Basic - ( 25 Apr 2024 01:02 )    Color: x / Appearance: x / SG: x / pH: x  Gluc: 355 mg/dL / Ketone: x  / Bili: x / Urobili: x   Blood: x / Protein: x / Nitrite: x   Leuk Esterase: x / RBC: x / WBC x   Sq Epi: x / Non Sq Epi: x / Bacteria: x        I&O's Detail          ECHO:  TTE with Doppler (w/Cont) (10.01.18 @ 16:08)  CONCLUSIONS:  Technically difficult study.  1. Mitral annular calcification, otherwise normal mitral  valve. Mild mitral regurgitation.  2. Endocardium not well visualized; grossly moderate global  left ventricular systolic dysfunction.  Endocardial  visualization enhanced with intravenous injection of echo  contrast (Definity).  3. Unable to accurately evaluate right ventricular size or  systolic function.      RADIOLOGY STUDIES:    CXR:       ASSESSMENT:  Patient is a 64 year old male with PMHx HFrEF (last EF 30%), HTN, DM2, HLD, CAD s/p MI in November 2005 with four-vessel bypass in 2005 at [facility] here for SOB and CP. Patient with new BIPAP requirement, speaking full sentences and mentating well on exam, noting improvement in breathing with BIPAP. Patient denying active chest pain. Troponin leak possibly demand in setting of HTN on presentation to ED, differential remains broad including possible flash pulmonary edema in setting of HTN urgency possibly due to noncompliance vs demand mismatch vs ACS vs CHF vs infectious etiology. Low suspicion for cardiogenic shock as most recent lactate 1.9 and patient appears warm on exam. Patient BP stable at present and temporarily requirement nitroglycerin gtt and has remained off of nitroglycerin gtt since time of interview. Given patient overall clinical status and current HD stability, patient deemed not a CCU candidate at this time. Should pt HD status worsen, please call cardiology once again for further assistance.         PLAN:  #New BIPAP requirement  #SOB  - Recommend admission to telemetry for continuous cardiac monitoring  - Patient currently on BIPAP 12/5/100%, mentating well and speaking full sentences  - Please follow up CXR imaging to assess for pulmonary edema vs rule out PNA  - Wean BIPAP as clinically tolerated  - Obtain TTE in AM for further assessment of LV function and rule out WMA  - Prior echo from 2018 technically difficult imaging study but demonstrating grossly moderate global LV systolic dysfunction  - Continue symptomatic management per primary medicine team and follow up infectious workup    #H/O HTN  - On Lisinopril 2.5 mg, Lopressor 25 mg BID, Lasix 40 mg PO BID at home  - Potential noncompliance as patient does not take meds every day  - Recommend Lasix 40 mg IV BID for now   - Can continue Lisinopril and Lopressor for now  - Nitro gtt on hold, would continue to hold restarting     #H/O CAD  - on Plavix at home, can continue  - Troponin 214, CKMB 11.3, no CK or CPK  - Patient without active chest pain at time of interview  - Possibly demand in setting of HTN urgency  - Please obtain additional set of troponin/CK/CKMB/CPK to ensure lack of significant uptrend  - Should there be significant uptrend in cardiac enzymes, would consider treating patient with ACS protocol  - Until repeat enzymes result, hold ACS protocol for now  - Serial EKGs PRN for chest pain and assess for dynamic changes  - Will benefit from cardiology team from following patient this admission  - Please obtain risk stratification labs including A1C, TSH, lipid profile, proBNP 1128        Case discussed with cardiology fellow Charlie Viveros MD  Should patient HD status change, please call cardiology at #[phone] once again.  Note Incomplete, Attending attestation to follow for final recommendations                   Reason for CCU Consult: New BIPAP requirement    HISTORY OF PRESENT ILLNESS:  Patient is a 64 year old male with PMHx HFrEF (last EF 30%), HTN, DM2, HLD, CAD s/p MI in November 2005 with four-vessel bypass in 2005 at [facility] here for SOB and CP.On interview, patient states he started to have 9/10 sharp midsternal chest pressure that began ~9 pm prior to [facility] ED arrival, associated with worsening SOB and diaphoresis, however on exam without active chest pain, palpitations, N/V/D/C. Patient notes improvement in symptoms with BIPAP. Patient states he takes home BP meds but really when "he feels like it." Per patient and son at bedside, patient reports having previously followed with [facility] but has not seen them in ~4 years and last reports seeing a cardiologist ~1.5 years ago. Patient states he is on Plavix 75 mg daily but no longer takes ASA, unclear as to reasoning. Patient reports following with Dr. Mojica as PCP says he sees PCP every month for followup. Denies smoking/ETOH/illicit substance use, denies recent infectious exposure/fever/chills/cough at home.    Hospital Course:  VS Afebrile 98F oral, HR 60s-80s, SBP initially 170s-200s s/p nitro paste and nitro gtt @200 mcg for ~15-30 mins then SBP dropped to 110s and nitro gtt on hold since, ~90% on room air, 100% NRB and work of breathing escalate to BIPAP 12/5/100% on exam mentating well and speaking full sentences on BIPAP, WBC 15.12, lactate 3.5-> 1.9, SCr 0.7, trop 214, Mg 1.8, CKMB 13.9, proBNP 1128, EKG with TWI in aVL and q waves in inferior leads, TWI new compared to prior EKG, RVP negative, awaiting CXR    HOME MEDICATIONS:  Furosemide 40 mg PO BID  Lisinopril 2.5 mg  Metoprolol tartrate 25 mg BID  Plavix 75 mg  Lantus and short acting insulin      Allergies  penicillin (Rash)          PAST MEDICAL & SURGICAL HISTORY:  HLD (hyperlipidemia)  DM (diabetes mellitus)  CAD (coronary artery disease)  CHF (congestive heart failure)  COPD (chronic obstructive pulmonary disease)  HTN (hypertension)  GERD (gastroesophageal reflux disease)  Stented coronary artery 2013  S/P CABG (coronary artery bypass graft) 2005  S/P arthroscopic knee surgery  H/O eye surgery          MEDICATIONS  (STANDING):  nitroglycerin  Infusion 200 MICROgram(s)/Min (60 mL/Hr) IV Continuous <Continuous>    MEDICATIONS  (PRN):      Drug Dosing Weight  Height (cm): 167.6 (25 Apr 2024 00:17)  Weight (kg): 86.2 (25 Apr 2024 00:17)  BMI (kg/m2): 30.7 (25 Apr 2024 00:17)  BSA (m2): 1.96 (25 Apr 2024 00:17)    FAMILY HISTORY:      SOCIAL HISTORY:  Smoker[ ]  Non smoker [ x ]  Alcohol [ ]      ADVANCE DIRECTIVES:    CONSTITUTIONAL: No fevers, No chills, No fatigue, No weight gain  EYES: No vision changes   ENT: No congestion, No ear pain, No sore throat.  NECK: No pain, No stiffness  RESPIRATORY: +Shortness of breath, No cough, No wheezing, No hemoptysis  CARDIOVASCULAR: +Chest pain. No palpitations, No RESENDIZ, No orthopnea, No paroxysmal nocturnal dyspnea, No pleuritic pain  GASTROINTESTINAL: No abdominal pain, No nausea, No vomiting, No hematemesis, No diarrhea No constipation. No melena  GENITOURINARY: No dysuria, No frequency, No incontinence, No hematuria  NEUROLOGICAL: No dizziness, No lightheadedness, No syncope, No LOC, No headache, No numbness or weakness  MUSCULOSKELETAL: No Edema, No joint pain, No joint swelling.  PSYCHIATRIC: No anxiety, No depression  DERMATOLOGY: No diaphoresis. No itching, No rashes, No pressure ulcers  HEME/LYMPH: No easy bruising, or bleeding gums    All other review of systems is negative unless indicated above.      PHYSICAL EXAM:  Appearance: Pt mentating well, speaking full sentences on BIPAP, no tripoding or resp distress  HEENT: Moist Mucous Membranes  Cardiovascular: Regular rate and rhythm, Normal S1 S2, No JVD, No murmurs  Respiratory: +Fine crackling on auscultation B/L +Tachypnea RR low 20s, no rales or wheezing   Gastrointestinal:  Soft, Non-tender, + BS  Neurologic: Non-focal, A&Ox3  Skin: Warm and dry, No rashes, No ecchymosis, No cyanosis  Musculoskeletal: No clubbing, No cyanosis, No edema  Vascular: Peripheral pulses palpable 2+ bilaterally  Psychiatry: Mood & affect appropriate      	    		            ICU Vital Signs Last 24 Hrs  T(C): 36.7 (25 Apr 2024 00:17), Max: 36.7 (25 Apr 2024 00:17)  T(F): 98.1 (25 Apr 2024 00:17), Max: 98.1 (25 Apr 2024 00:17)  HR: 60 (25 Apr 2024 02:05) (60 - 93)  BP: 120/73 (25 Apr 2024 02:05) (120/73 - 207/104)  BP(mean): 92 (25 Apr 2024 01:28) (92 - 105)  RR: 18 (25 Apr 2024 02:05) (18 - 25)  SpO2: 100% (25 Apr 2024 02:05) (97% - 100%)    O2 Parameters below as of 25 Apr 2024 02:05  Patient On (Oxygen Delivery Method): room air                LABS:  CBC Full  -  ( 25 Apr 2024 01:02 )  WBC Count : 15.12 K/uL  RBC Count : 5.71 M/uL  Hemoglobin : 17.0 g/dL  Hematocrit : 49.9 %  Platelet Count - Automated : 176 K/uL  Mean Cell Volume : 87.4 fL  Mean Cell Hemoglobin : 29.8 pg  Mean Cell Hemoglobin Concentration : 34.1 gm/dL  Auto Neutrophil # : 7.70 K/uL  Auto Lymphocyte # : 3.30 K/uL  Auto Monocyte # : 2.07 K/uL  Auto Eosinophil # : 0.00 K/uL  Auto Basophil # : 0.14 K/uL  Auto Neutrophil % : 50.0 %  Auto Lymphocyte % : 21.8 %  Auto Monocyte % : 13.7 %  Auto Eosinophil % : 0.0 %  Auto Basophil % : 0.9 %    04-25    133<L>  |  97<L>  |  19  ----------------------------<  355<H>  TNP   |  19<L>  |  0.70    Ca    9.1      25 Apr 2024 01:02  Mg     1.80     04-25    TPro  TNP  /  Alb  4.3  /  TBili  1.0  /  DBili  x   /  AST  112<H>  /  ALT  SeeComment SPECIMEN GROSSLY HEMOLYZED.  CORRECTION NOTIFIED TO DR. SONIA, M  /  AlkPhos  138<H>  04-25    CARDIAC MARKERS ( 25 Apr 2024 01:02 )  x     / x     / TNP U/L / x     / 13.9 ng/mL      CAPILLARY BLOOD GLUCOSE      POCT Blood Glucose.: 380 mg/dL (25 Apr 2024 02:52)    PT/INR - ( 25 Apr 2024 01:02 )   PT: 11.2 sec;   INR: 0.99 ratio         PTT - ( 25 Apr 2024 01:02 )  PTT:33.8 sec  Urinalysis Basic - ( 25 Apr 2024 01:02 )    Color: x / Appearance: x / SG: x / pH: x  Gluc: 355 mg/dL / Ketone: x  / Bili: x / Urobili: x   Blood: x / Protein: x / Nitrite: x   Leuk Esterase: x / RBC: x / WBC x   Sq Epi: x / Non Sq Epi: x / Bacteria: x        I&O's Detail          ECHO:  TTE with Doppler (w/Cont) (10.01.18 @ 16:08)  CONCLUSIONS:  Technically difficult study.  1. Mitral annular calcification, otherwise normal mitral  valve. Mild mitral regurgitation.  2. Endocardium not well visualized; grossly moderate global  left ventricular systolic dysfunction.  Endocardial  visualization enhanced with intravenous injection of echo  contrast (Definity).  3. Unable to accurately evaluate right ventricular size or  systolic function.      RADIOLOGY STUDIES:    CXR:       ASSESSMENT:  Patient is a 64 year old male with PMHx HFrEF (last EF 30%), HTN, DM2, HLD, CAD s/p MI in November 2005 with four-vessel bypass in 2005 at [facility] here for SOB and CP. Patient with new BIPAP requirement, speaking full sentences and mentating well on exam, noting improvement in breathing with BIPAP. Patient denying active chest pain. Troponin leak possibly demand in setting of HTN on presentation to ED, differential remains broad including possible flash pulmonary edema in setting of HTN urgency possibly due to noncompliance vs demand mismatch vs ACS vs CHF vs infectious etiology. Low suspicion for cardiogenic shock as most recent lactate 1.9 and patient appears warm on exam. Patient BP stable at present and temporarily requirement nitroglycerin gtt and has remained off of nitroglycerin gtt since time of interview. Given patient overall clinical status and current HD stability, patient deemed not a CCU candidate at this time. Should pt HD status worsen, please call cardiology once again for further assistance.         PLAN:  #New BIPAP requirement  #SOB  - Recommend admission to telemetry for continuous cardiac monitoring  - Patient currently on BIPAP 12/5/100%, mentating well and speaking full sentences  - Please follow up CXR imaging to assess for pulmonary edema vs rule out PNA  - Wean BIPAP as clinically tolerated  - Obtain TTE in AM for further assessment of LV function and rule out WMA  - Prior echo from 2018 technically difficult imaging study but demonstrating grossly moderate global LV systolic dysfunction  - Replete lytes to maintain K>4 and Mg>2  - Continue symptomatic management per primary medicine team and follow up infectious workup    #H/O HTN  - On Lisinopril 2.5 mg, Lopressor 25 mg BID, Lasix 40 mg PO BID at home  - Potential noncompliance as patient does not take meds every day  - Recommend Lasix 40 mg IV BID for now   - Can continue Lisinopril and Lopressor for now  - Nitro gtt on hold, would continue to hold restarting     #H/O CAD  - on Plavix at home, can continue  - Troponin 214, CKMB 11.3, no CK or CPK  - Patient without active chest pain at time of interview  - Possibly demand in setting of HTN urgency  - Please obtain additional set of troponin/CK/CKMB/CPK to ensure lack of significant uptrend  - Should there be significant uptrend in cardiac enzymes, would consider treating patient with ACS protocol  - Until repeat enzymes result, hold ACS protocol for now  - Serial EKGs PRN for chest pain and assess for dynamic changes  - Will benefit from cardiology team from following patient this admission  - Please obtain risk stratification labs including A1C, TSH, lipid profile, proBNP 1128        Case discussed with cardiology fellow Charlie Viveros MD  Should patient HD status change, please call cardiology at #[phone] once again.  Note Incomplete, Attending attestation to follow for final recommendations                   Reason for CCU Consult: New BIPAP requirement    HISTORY OF PRESENT ILLNESS:  Patient is a 64 year old male with PMHx HFrEF (last EF 30%) s/p [employer] ICD for primary prevention, HTN, DM2, HLD, CAD s/p MI in November 2005 with four-vessel bypass in 2005 at [facility] here for SOB and CP.On interview, patient states he started to have 9/10 sharp midsternal chest pressure that began ~9 pm prior to [facility] ED arrival, associated with worsening SOB and diaphoresis, however on exam without active chest pain, palpitations, N/V/D/C. Patient notes improvement in symptoms with BIPAP. Patient states he takes home BP meds but really when "he feels like it." Per patient and son at bedside, patient reports having previously followed with [facility] but has not seen them in ~4 years and last reports seeing a cardiologist ~1.5 years ago. Patient states he is on Plavix 75 mg daily but no longer takes ASA, unclear as to reasoning. Patient reports following with Dr. Mojica as PCP says he sees PCP every month for followup. Denies smoking/ETOH/illicit substance use, denies recent infectious exposure/fever/chills/cough at home.    Hospital Course:  VS Afebrile 98F oral, HR 60s-80s, SBP initially 170s-200s s/p nitro paste and nitro gtt @200 mcg for ~15-30 mins then SBP dropped to 110s and nitro gtt on hold since, ~90% on room air, 100% NRB and work of breathing escalate to BIPAP 12/5/100% on exam mentating well and speaking full sentences on BIPAP, WBC 15.12, lactate 3.5-> 1.9, SCr 0.7, trop 214, Mg 1.8, CKMB 13.9, proBNP 1128, EKG with TWI in aVL and q waves in inferior leads, TWI new compared to prior EKG, RVP negative, awaiting CXR    HOME MEDICATIONS:  Furosemide 40 mg PO BID  Lisinopril 2.5 mg  Metoprolol tartrate 25 mg BID  Plavix 75 mg  Lantus and short acting insulin      Allergies  penicillin (Rash)          PAST MEDICAL & SURGICAL HISTORY:  HLD (hyperlipidemia)  DM (diabetes mellitus)  CAD (coronary artery disease)  CHF (congestive heart failure)  COPD (chronic obstructive pulmonary disease)  HTN (hypertension)  GERD (gastroesophageal reflux disease)  Stented coronary artery 2013  S/P CABG (coronary artery bypass graft) 2005  S/P arthroscopic knee surgery  H/O eye surgery          MEDICATIONS  (STANDING):  nitroglycerin  Infusion 200 MICROgram(s)/Min (60 mL/Hr) IV Continuous <Continuous>    MEDICATIONS  (PRN):      Drug Dosing Weight  Height (cm): 167.6 (25 Apr 2024 00:17)  Weight (kg): 86.2 (25 Apr 2024 00:17)  BMI (kg/m2): 30.7 (25 Apr 2024 00:17)  BSA (m2): 1.96 (25 Apr 2024 00:17)    FAMILY HISTORY:      SOCIAL HISTORY:  Smoker[ ]  Non smoker [ x ]  Alcohol [ ]      ADVANCE DIRECTIVES:    CONSTITUTIONAL: No fevers, No chills, No fatigue, No weight gain  EYES: No vision changes   ENT: No congestion, No ear pain, No sore throat.  NECK: No pain, No stiffness  RESPIRATORY: +Shortness of breath, No cough, No wheezing, No hemoptysis  CARDIOVASCULAR: +Chest pain. No palpitations, No RESENDIZ, No orthopnea, No paroxysmal nocturnal dyspnea, No pleuritic pain  GASTROINTESTINAL: No abdominal pain, No nausea, No vomiting, No hematemesis, No diarrhea No constipation. No melena  GENITOURINARY: No dysuria, No frequency, No incontinence, No hematuria  NEUROLOGICAL: No dizziness, No lightheadedness, No syncope, No LOC, No headache, No numbness or weakness  MUSCULOSKELETAL: No Edema, No joint pain, No joint swelling.  PSYCHIATRIC: No anxiety, No depression  DERMATOLOGY: No diaphoresis. No itching, No rashes, No pressure ulcers  HEME/LYMPH: No easy bruising, or bleeding gums    All other review of systems is negative unless indicated above.      PHYSICAL EXAM:  Appearance: Pt mentating well, speaking full sentences on BIPAP, no tripoding or resp distress  HEENT: Moist Mucous Membranes  Cardiovascular: Regular rate and rhythm, Normal S1 S2, No JVD, No murmurs  Respiratory: +Fine crackling on auscultation B/L +Tachypnea RR low 20s, no rales or wheezing   Gastrointestinal:  Soft, Non-tender, + BS  Neurologic: Non-focal, A&Ox3  Skin: Warm and dry, No rashes, No ecchymosis, No cyanosis  Musculoskeletal: No clubbing, No cyanosis, No edema  Vascular: Peripheral pulses palpable 2+ bilaterally  Psychiatry: Mood & affect appropriate      	    		            ICU Vital Signs Last 24 Hrs  T(C): 36.7 (25 Apr 2024 00:17), Max: 36.7 (25 Apr 2024 00:17)  T(F): 98.1 (25 Apr 2024 00:17), Max: 98.1 (25 Apr 2024 00:17)  HR: 60 (25 Apr 2024 02:05) (60 - 93)  BP: 120/73 (25 Apr 2024 02:05) (120/73 - 207/104)  BP(mean): 92 (25 Apr 2024 01:28) (92 - 105)  RR: 18 (25 Apr 2024 02:05) (18 - 25)  SpO2: 100% (25 Apr 2024 02:05) (97% - 100%)    O2 Parameters below as of 25 Apr 2024 02:05  Patient On (Oxygen Delivery Method): room air                LABS:  CBC Full  -  ( 25 Apr 2024 01:02 )  WBC Count : 15.12 K/uL  RBC Count : 5.71 M/uL  Hemoglobin : 17.0 g/dL  Hematocrit : 49.9 %  Platelet Count - Automated : 176 K/uL  Mean Cell Volume : 87.4 fL  Mean Cell Hemoglobin : 29.8 pg  Mean Cell Hemoglobin Concentration : 34.1 gm/dL  Auto Neutrophil # : 7.70 K/uL  Auto Lymphocyte # : 3.30 K/uL  Auto Monocyte # : 2.07 K/uL  Auto Eosinophil # : 0.00 K/uL  Auto Basophil # : 0.14 K/uL  Auto Neutrophil % : 50.0 %  Auto Lymphocyte % : 21.8 %  Auto Monocyte % : 13.7 %  Auto Eosinophil % : 0.0 %  Auto Basophil % : 0.9 %    04-25    133<L>  |  97<L>  |  19  ----------------------------<  355<H>  TNP   |  19<L>  |  0.70    Ca    9.1      25 Apr 2024 01:02  Mg     1.80     04-25    TPro  TNP  /  Alb  4.3  /  TBili  1.0  /  DBili  x   /  AST  112<H>  /  ALT  SeeComment SPECIMEN GROSSLY HEMOLYZED.  CORRECTION NOTIFIED TO DR. SCOTT, M  /  AlkPhos  138<H>  04-25    CARDIAC MARKERS ( 25 Apr 2024 01:02 )  x     / x     / TNP U/L / x     / 13.9 ng/mL      CAPILLARY BLOOD GLUCOSE      POCT Blood Glucose.: 380 mg/dL (25 Apr 2024 02:52)    PT/INR - ( 25 Apr 2024 01:02 )   PT: 11.2 sec;   INR: 0.99 ratio         PTT - ( 25 Apr 2024 01:02 )  PTT:33.8 sec  Urinalysis Basic - ( 25 Apr 2024 01:02 )    Color: x / Appearance: x / SG: x / pH: x  Gluc: 355 mg/dL / Ketone: x  / Bili: x / Urobili: x   Blood: x / Protein: x / Nitrite: x   Leuk Esterase: x / RBC: x / WBC x   Sq Epi: x / Non Sq Epi: x / Bacteria: x        I&O's Detail          ECHO:  TTE with Doppler (w/Cont) (10.01.18 @ 16:08)  CONCLUSIONS:  Technically difficult study.  1. Mitral annular calcification, otherwise normal mitral  valve. Mild mitral regurgitation.  2. Endocardium not well visualized; grossly moderate global  left ventricular systolic dysfunction.  Endocardial  visualization enhanced with intravenous injection of echo  contrast (Definity).  3. Unable to accurately evaluate right ventricular size or  systolic function.      RADIOLOGY STUDIES:    CXR:       ASSESSMENT:  Patient is a 64 year old male with PMHx HFrEF (last EF 30%), HTN, DM2, HLD, CAD s/p MI in November 2005 with four-vessel bypass in 2005 at [facility] here for SOB and CP. Patient with new BIPAP requirement, speaking full sentences and mentating well on exam, noting improvement in breathing with BIPAP. Patient denying active chest pain. Troponin leak possibly demand in setting of HTN on presentation to ED, differential remains broad including possible flash pulmonary edema in setting of HTN urgency possibly due to noncompliance vs demand mismatch vs ACS vs CHF vs infectious etiology. Low suspicion for cardiogenic shock as most recent lactate 1.9 and patient appears warm on exam. Patient BP stable at present and temporarily requirement nitroglycerin gtt and has remained off of nitroglycerin gtt since time of interview. Given patient overall clinical status and current HD stability, patient deemed not a CCU candidate at this time. Should pt HD status worsen, please call cardiology once again for further assistance.         PLAN:  #New BIPAP requirement  #SOB  - Recommend admission to telemetry for continuous cardiac monitoring  - Patient currently on BIPAP 12/5/100%, mentating well and speaking full sentences  - Please follow up CXR imaging to assess for pulmonary edema vs rule out PNA  - Wean BIPAP as clinically tolerated  - Obtain TTE in AM for further assessment of LV function and rule out WMA  - Prior echo from 2018 technically difficult imaging study but demonstrating grossly moderate global LV systolic dysfunction  - Replete lytes to maintain K>4 and Mg>2  - Continue symptomatic management per primary medicine team and follow up infectious workup    #H/O HTN  - On Lisinopril 2.5 mg, Lopressor 25 mg BID, Lasix 40 mg PO BID at home  - Potential noncompliance as patient does not take meds every day  - Recommend Lasix 40 mg IV BID for now   - Can continue Lisinopril and Lopressor for now  - Nitro gtt on hold, would continue to hold restarting     #H/O CAD  - on Plavix at home, can continue  - Troponin 214, CKMB 11.3, no CK or CPK  - Patient without active chest pain at time of interview  - Possibly demand in setting of HTN urgency  - Please obtain additional set of troponin/CK/CKMB/CPK to ensure lack of significant uptrend  - Should there be significant uptrend in cardiac enzymes, would consider treating patient with ACS protocol  - Until repeat enzymes result, hold ACS protocol for now  - Serial EKGs PRN for chest pain and assess for dynamic changes  - Will benefit from cardiology team from following patient this admission  - Please obtain risk stratification labs including A1C, TSH, lipid profile, proBNP 1128        Case discussed with cardiology fellow Charlie Viveros MD  Should patient HD status change, please call cardiology at #[phone] once again.  Note Incomplete, Attending attestation to follow for final recommendations                   Reason for CCU Consult: New BIPAP requirement    HISTORY OF PRESENT ILLNESS:  Patient is a 64 year old male with PMHx HFrEF (last EF 30%) s/p [employer] ICD for primary prevention, HTN, DM2, HLD, CAD s/p MI in November 2005 with four-vessel bypass in 2005 at [facility] here for SOB and CP.On interview, patient states he started to have 9/10 sharp midsternal chest pressure that began ~9 pm prior to [facility] ED arrival, associated with worsening SOB and diaphoresis, however on exam without active chest pain, palpitations, N/V/D/C. Patient notes improvement in symptoms with BIPAP. Patient states he takes home BP meds but really when "he feels like it." Per patient and son at bedside, patient reports having previously followed with [facility] but has not seen them in ~4 years and last reports seeing a cardiologist ~1.5 years ago. Patient states he is on Plavix 75 mg daily but no longer takes ASA, unclear as to reasoning. Patient reports following with Dr. Mojica as PCP says he sees PCP every month for followup. Denies smoking/ETOH/illicit substance use, denies recent infectious exposure/fever/chills/cough at home.    Hospital Course:  VS Afebrile 98F oral, HR 60s-80s, SBP initially 170s-200s s/p nitro paste and nitro gtt @200 mcg for ~15-30 mins then SBP dropped to 110s and nitro gtt on hold since, ~90% on room air, 100% NRB and work of breathing escalate to BIPAP 12/5/100% on exam mentating well and speaking full sentences on BIPAP, WBC 15.12, lactate 3.5-> 1.9, SCr 0.7, trop 214, Mg 1.8, CKMB 13.9, proBNP 1128, EKG with TWI in aVL and q waves in inferior leads, TWI new compared to prior EKG, RVP negative, awaiting CXR    HOME MEDICATIONS:  Furosemide 40 mg PO BID  Lisinopril 2.5 mg  Metoprolol tartrate 25 mg BID  Plavix 75 mg  Lantus and short acting insulin      Allergies  penicillin (Rash)          PAST MEDICAL & SURGICAL HISTORY:  HLD (hyperlipidemia)  DM (diabetes mellitus)  CAD (coronary artery disease)  CHF (congestive heart failure)  COPD (chronic obstructive pulmonary disease)  HTN (hypertension)  GERD (gastroesophageal reflux disease)  Stented coronary artery 2013  S/P CABG (coronary artery bypass graft) 2005  S/P arthroscopic knee surgery  H/O eye surgery          MEDICATIONS  (STANDING):  nitroglycerin  Infusion 200 MICROgram(s)/Min (60 mL/Hr) IV Continuous <Continuous>    MEDICATIONS  (PRN):      Drug Dosing Weight  Height (cm): 167.6 (25 Apr 2024 00:17)  Weight (kg): 86.2 (25 Apr 2024 00:17)  BMI (kg/m2): 30.7 (25 Apr 2024 00:17)  BSA (m2): 1.96 (25 Apr 2024 00:17)    FAMILY HISTORY:      SOCIAL HISTORY:  Smoker[ ]  Non smoker [ x ]  Alcohol [ ]      ADVANCE DIRECTIVES:    CONSTITUTIONAL: No fevers, No chills, No fatigue, No weight gain  EYES: No vision changes   ENT: No congestion, No ear pain, No sore throat.  NECK: No pain, No stiffness  RESPIRATORY: +Shortness of breath, No cough, No wheezing, No hemoptysis  CARDIOVASCULAR: +Chest pain. No palpitations, No RESENDIZ, No orthopnea, No paroxysmal nocturnal dyspnea, No pleuritic pain  GASTROINTESTINAL: No abdominal pain, No nausea, No vomiting, No hematemesis, No diarrhea No constipation. No melena  GENITOURINARY: No dysuria, No frequency, No incontinence, No hematuria  NEUROLOGICAL: No dizziness, No lightheadedness, No syncope, No LOC, No headache, No numbness or weakness  MUSCULOSKELETAL: No Edema, No joint pain, No joint swelling.  PSYCHIATRIC: No anxiety, No depression  DERMATOLOGY: No diaphoresis. No itching, No rashes, No pressure ulcers  HEME/LYMPH: No easy bruising, or bleeding gums    All other review of systems is negative unless indicated above.      PHYSICAL EXAM:  Appearance: Pt mentating well, speaking full sentences on BIPAP, no tripoding or resp distress  HEENT: Moist Mucous Membranes  Cardiovascular: Regular rate and rhythm, Normal S1 S2, No JVD, No murmurs  Respiratory: +Fine crackling on auscultation B/L +Tachypnea RR low 20s, no rales or wheezing   Gastrointestinal:  Soft, Non-tender, + BS  Neurologic: Non-focal, A&Ox3  Skin: Warm and dry, No rashes, No ecchymosis, No cyanosis  Musculoskeletal: No clubbing, No cyanosis, No edema  Vascular: Peripheral pulses palpable 2+ bilaterally  Psychiatry: Mood & affect appropriate      	    		            ICU Vital Signs Last 24 Hrs  T(C): 36.7 (25 Apr 2024 00:17), Max: 36.7 (25 Apr 2024 00:17)  T(F): 98.1 (25 Apr 2024 00:17), Max: 98.1 (25 Apr 2024 00:17)  HR: 60 (25 Apr 2024 02:05) (60 - 93)  BP: 120/73 (25 Apr 2024 02:05) (120/73 - 207/104)  BP(mean): 92 (25 Apr 2024 01:28) (92 - 105)  RR: 18 (25 Apr 2024 02:05) (18 - 25)  SpO2: 100% (25 Apr 2024 02:05) (97% - 100%)    O2 Parameters below as of 25 Apr 2024 02:05  Patient On (Oxygen Delivery Method): room air                LABS:  CBC Full  -  ( 25 Apr 2024 01:02 )  WBC Count : 15.12 K/uL  RBC Count : 5.71 M/uL  Hemoglobin : 17.0 g/dL  Hematocrit : 49.9 %  Platelet Count - Automated : 176 K/uL  Mean Cell Volume : 87.4 fL  Mean Cell Hemoglobin : 29.8 pg  Mean Cell Hemoglobin Concentration : 34.1 gm/dL  Auto Neutrophil # : 7.70 K/uL  Auto Lymphocyte # : 3.30 K/uL  Auto Monocyte # : 2.07 K/uL  Auto Eosinophil # : 0.00 K/uL  Auto Basophil # : 0.14 K/uL  Auto Neutrophil % : 50.0 %  Auto Lymphocyte % : 21.8 %  Auto Monocyte % : 13.7 %  Auto Eosinophil % : 0.0 %  Auto Basophil % : 0.9 %    04-25    133<L>  |  97<L>  |  19  ----------------------------<  355<H>  TNP   |  19<L>  |  0.70    Ca    9.1      25 Apr 2024 01:02  Mg     1.80     04-25    TPro  TNP  /  Alb  4.3  /  TBili  1.0  /  DBili  x   /  AST  112<H>  /  ALT  SeeComment SPECIMEN GROSSLY HEMOLYZED.  CORRECTION NOTIFIED TO DR. SCOTT, M  /  AlkPhos  138<H>  04-25    CARDIAC MARKERS ( 25 Apr 2024 01:02 )  x     / x     / TNP U/L / x     / 13.9 ng/mL      CAPILLARY BLOOD GLUCOSE      POCT Blood Glucose.: 380 mg/dL (25 Apr 2024 02:52)    PT/INR - ( 25 Apr 2024 01:02 )   PT: 11.2 sec;   INR: 0.99 ratio         PTT - ( 25 Apr 2024 01:02 )  PTT:33.8 sec  Urinalysis Basic - ( 25 Apr 2024 01:02 )    Color: x / Appearance: x / SG: x / pH: x  Gluc: 355 mg/dL / Ketone: x  / Bili: x / Urobili: x   Blood: x / Protein: x / Nitrite: x   Leuk Esterase: x / RBC: x / WBC x   Sq Epi: x / Non Sq Epi: x / Bacteria: x        I&O's Detail          ECHO:  TTE with Doppler (w/Cont) (10.01.18 @ 16:08)  CONCLUSIONS:  Technically difficult study.  1. Mitral annular calcification, otherwise normal mitral  valve. Mild mitral regurgitation.  2. Endocardium not well visualized; grossly moderate global  left ventricular systolic dysfunction.  Endocardial  visualization enhanced with intravenous injection of echo  contrast (Definity).  3. Unable to accurately evaluate right ventricular size or  systolic function.      RADIOLOGY STUDIES:    CXR:

## 2024-04-25 NOTE — CONSULT NOTE ADULT - ASSESSMENT
Patient is a 64 year old male with PMHx HFrEF (last EF 30%), HTN, DM2, HLD, CAD s/p MI in November 2005 with four-vessel bypass in 2005 at [facility] here for SOB and CP. Patient with new BIPAP requirement, speaking full sentences and mentating well on exam, noting improvement in breathing with BIPAP. Patient denying active chest pain. Troponin leak possibly demand in setting of HTN on presentation to ED, differential remains broad including possible flash pulmonary edema in setting of HTN urgency possibly due to noncompliance vs demand mismatch vs ACS vs CHF vs infectious etiology. Low suspicion for cardiogenic shock as most recent lactate 1.9 and patient appears warm on exam. Patient BP stable at present and temporarily requirement nitroglycerin gtt and has remained off of nitroglycerin gtt since time of interview. Given patient overall clinical status and current HD stability, patient deemed not a CCU candidate at this time. Should pt HD status worsen, please call cardiology once again for further assistance.         PLAN:  #New BIPAP requirement  #SOB  - Recommend admission to telemetry for continuous cardiac monitoring  - Patient currently on BIPAP 12/5/100%, mentating well and speaking full sentences  - Please follow up CXR imaging to assess for pulmonary edema vs rule out PNA  - Wean BIPAP as clinically tolerated  - Obtain TTE in AM for further assessment of LV function and rule out WMA  - Prior echo from 2018 technically difficult imaging study but demonstrating grossly moderate global LV systolic dysfunction  - Replete lytes to maintain K>4 and Mg>2  - Continue symptomatic management per primary medicine team and follow up infectious workup    #H/O HTN  - On Lisinopril 2.5 mg, Lopressor 25 mg BID, Lasix 40 mg PO BID at home  - Potential noncompliance as patient does not take meds every day  - Recommend Lasix 40 mg IV BID for now   - Can continue Lisinopril and Lopressor for now  - Nitro gtt on hold, would continue to hold restarting   - Addendum @0620 Hold Lisinopril for now pending initiation of ischemic eval    #H/O CAD  - on Plavix at home, can continue  - Troponin 214, CKMB 11.3, no CK or CPK  - Patient without active chest pain at time of interview  - Possibly demand in setting of HTN urgency  - Please obtain additional set of troponin/CK/CKMB/CPK to ensure lack of significant uptrend  - Should there be significant uptrend in cardiac enzymes, would consider treating patient with ACS protocol  - Until repeat enzymes result, hold ACS protocol for now  - Serial EKGs PRN for chest pain and assess for dynamic changes  - Will benefit from cardiology team from following patient this admission  - Please obtain risk stratification labs including A1C, TSH, lipid profile, proBNP 1128  - Addendum @0697 s/p ACS load, continue ASA 81, Plavix 75 mg, Lipitor 80, may need ischemic eval this admission, now off BIPAP and tolerating well still not requiring nitro gtt        Case discussed with cardiology fellow Charlie Viveros MD  Should patient HD status change, please call cardiology at #[phone] once again.  Note Incomplete, Attending attestation to follow for final recommendations Patient is a 64 year old male with PMHx HFrEF (last EF 30%), HTN, DM2, HLD, CAD s/p MI in November 2005 with four-vessel bypass in 2005 at [facility] here for SOB and CP. Patient with new BIPAP requirement, speaking full sentences and mentating well on exam, noting improvement in breathing with BIPAP. Patient denying active chest pain. Troponin leak possibly demand in setting of HTN on presentation to ED, differential remains broad including possible flash pulmonary edema in setting of HTN urgency possibly due to noncompliance vs demand mismatch vs ACS vs CHF vs infectious etiology. Low suspicion for cardiogenic shock as most recent lactate 1.9 and patient appears warm on exam. Patient BP stable at present and temporarily requirement nitroglycerin gtt and has remained off of nitroglycerin gtt since time of interview. Given patient overall clinical status and current HD stability, patient deemed not a CCU candidate at this time. Should pt HD status worsen, please call cardiology once again for further assistance.         PLAN:  #New BIPAP requirement  #SOB  - Recommend admission to telemetry for continuous cardiac monitoring  - Patient currently on BIPAP 12/5/100%, mentating well and speaking full sentences  - Please follow up CXR imaging to assess for pulmonary edema vs rule out PNA  - Wean BIPAP as clinically tolerated  - Obtain TTE in AM for further assessment of LV function and rule out WMA  - Prior echo from 2018 technically difficult imaging study but demonstrating grossly moderate global LV systolic dysfunction  - Replete lytes to maintain K>4 and Mg>2  - Continue symptomatic management per primary medicine team and follow up infectious workup    #H/O HTN  - On Lisinopril 2.5 mg, Lopressor 25 mg BID, Lasix 40 mg PO BID at home  - Potential noncompliance as patient does not take meds every day  - Recommend Lasix 40 mg IV BID for now   - Can continue Lisinopril and Lopressor for now  - Nitro gtt on hold, would continue to hold restarting   - Addendum @0620 Hold Lisinopril for now pending initiation of ischemic eval    #H/O CAD  - on Plavix at home, can continue  - Troponin 214, CKMB 11.3, no CK or CPK  - Patient without active chest pain at time of interview  - Possibly demand in setting of HTN urgency  - Please obtain additional set of troponin/CK/CKMB/CPK to ensure lack of significant uptrend  - Should there be significant uptrend in cardiac enzymes, would consider treating patient with ACS protocol  - Until repeat enzymes result, hold ACS protocol for now  - Serial EKGs PRN for chest pain and assess for dynamic changes  - Will benefit from cardiology team from following patient this admission  - Please obtain risk stratification labs including A1C, TSH, lipid profile, proBNP 1128  - Addendum @0613 s/p ACS load, continue ASA 81, Plavix 75 mg, Lipitor 80, may need ischemic eval this admission, now off BIPAP and tolerating well still not requiring nitro gtt  - Keep NPO except meds for now        Case discussed with cardiology fellow Charlie Viveros MD  Should patient HD status change, please call cardiology at #[phone] once again.  Note Incomplete, Attending attestation to follow for final recommendations

## 2024-04-25 NOTE — PROVIDER CONTACT NOTE (OTHER) - ACTION/TREATMENT ORDERED:
ACP Jus Sousa and ACP Irish Ayala made aware of hyperglycemia. Continue to monitor patient's fingersticks at meals and bedtime as ordered.

## 2024-04-25 NOTE — PATIENT PROFILE ADULT - FALL HARM RISK - HARM RISK INTERVENTIONS

## 2024-04-25 NOTE — H&P ADULT - ASSESSMENT
64 y.o. M w/ a hx of HFrEF, EF 33%, HTN, DM2, CAD s/p CABG (2005) and stent (2012) who presents with chest pressure and SOB found to have an NSTEMI and decompensated HF v HTN emergency.            64 y.o. M w/ a hx of HFrEF, EF 33%, HTN, DM2, CAD s/p CABG (2005) and stent (2012) who presents with chest pressure and SOB found to have an NSTEMI and decompensated HF v HTN emergency.

## 2024-04-25 NOTE — H&P ADULT - NSHPLABSRESULTS_GEN_ALL_CORE
LABS:                        15.2   9.14  )-----------( 141      ( 25 Apr 2024 11:40 )             45.7     25 Apr 2024 10:20    140    |  100    |  19     ----------------------------<  214    3.8     |  28     |  0.84     Ca    9.6        25 Apr 2024 10:20  Phos  3.2       25 Apr 2024 10:20  Mg     1.60      25 Apr 2024 10:20    TPro  7.1    /  Alb  4.0    /  TBili  1.1    /  DBili  x      /  AST  89     /  ALT  42     /  AlkPhos  121    25 Apr 2024 10:20    PT/INR - ( 25 Apr 2024 01:02 )   PT: 11.2 sec;   INR: 0.99 ratio         PTT - ( 25 Apr 2024 11:40 )  PTT:45.9 sec

## 2024-04-25 NOTE — H&P ADULT - NSHPPHYSICALEXAM_GEN_ALL_CORE
GENERAL: NAD, resting in bed   HEAD:  Atraumatic, Normocephalic  EYES: EOMI, PERRLA, conjunctiva and sclera clear  NECK: Supple, unable to assess JVD   CHEST/LUNG: Scattered crackles; No wheeze  HEART: Regular rate and rhythm; No murmurs, rubs, or gallops  ABDOMEN: Soft, Nontender, Nondistended; Bowel sounds present  EXTREMITIES:  2+ Peripheral Pulses, No clubbing, cyanosis, or edema  PSYCH: AAOx3  NEUROLOGY: non-focal  SKIN: No rashes or lesions

## 2024-04-25 NOTE — ED ADULT TRIAGE NOTE - CHIEF COMPLAINT QUOTE
c/o chest pain x 1 hour associated with SOB. hypoxic to 91% on RA, placed on NRB by EMS with improvement. accessory muscle use noted. denies headache, dizziness, fevers/chills, sick contacts. past medical Hx HTN, T2DM, CHF, GERD, COPD CAD x 1 stent

## 2024-04-26 DIAGNOSIS — E11.9 TYPE 2 DIABETES MELLITUS WITHOUT COMPLICATIONS: ICD-10-CM

## 2024-04-26 DIAGNOSIS — I10 ESSENTIAL (PRIMARY) HYPERTENSION: ICD-10-CM

## 2024-04-26 DIAGNOSIS — E78.5 HYPERLIPIDEMIA, UNSPECIFIED: ICD-10-CM

## 2024-04-26 LAB
A1C WITH ESTIMATED AVERAGE GLUCOSE RESULT: 11.1 % — HIGH (ref 4–5.6)
ANION GAP SERPL CALC-SCNC: 14 MMOL/L — SIGNIFICANT CHANGE UP (ref 7–14)
APTT BLD: 74.5 SEC — HIGH (ref 24.5–35.6)
APTT BLD: 82.6 SEC — HIGH (ref 24.5–35.6)
BASOPHILS # BLD AUTO: 0.04 K/UL — SIGNIFICANT CHANGE UP (ref 0–0.2)
BASOPHILS NFR BLD AUTO: 0.6 % — SIGNIFICANT CHANGE UP (ref 0–2)
BUN SERPL-MCNC: 18 MG/DL — SIGNIFICANT CHANGE UP (ref 7–23)
CALCIUM SERPL-MCNC: 9.1 MG/DL — SIGNIFICANT CHANGE UP (ref 8.4–10.5)
CHLORIDE SERPL-SCNC: 100 MMOL/L — SIGNIFICANT CHANGE UP (ref 98–107)
CO2 SERPL-SCNC: 25 MMOL/L — SIGNIFICANT CHANGE UP (ref 22–31)
CREAT SERPL-MCNC: 0.73 MG/DL — SIGNIFICANT CHANGE UP (ref 0.5–1.3)
EGFR: 102 ML/MIN/1.73M2 — SIGNIFICANT CHANGE UP
EOSINOPHIL # BLD AUTO: 0.16 K/UL — SIGNIFICANT CHANGE UP (ref 0–0.5)
EOSINOPHIL NFR BLD AUTO: 2.2 % — SIGNIFICANT CHANGE UP (ref 0–6)
ESTIMATED AVERAGE GLUCOSE: 272 — SIGNIFICANT CHANGE UP
GLUCOSE BLDC GLUCOMTR-MCNC: 176 MG/DL — HIGH (ref 70–99)
GLUCOSE BLDC GLUCOMTR-MCNC: 185 MG/DL — HIGH (ref 70–99)
GLUCOSE BLDC GLUCOMTR-MCNC: 244 MG/DL — HIGH (ref 70–99)
GLUCOSE BLDC GLUCOMTR-MCNC: 302 MG/DL — HIGH (ref 70–99)
GLUCOSE SERPL-MCNC: 252 MG/DL — HIGH (ref 70–99)
HCT VFR BLD CALC: 44.2 % — SIGNIFICANT CHANGE UP (ref 39–50)
HCV AB S/CO SERPL IA: 0.07 S/CO — SIGNIFICANT CHANGE UP (ref 0–0.99)
HCV AB SERPL-IMP: SIGNIFICANT CHANGE UP
HGB BLD-MCNC: 14.5 G/DL — SIGNIFICANT CHANGE UP (ref 13–17)
HIV 1+2 AB+HIV1 P24 AG SERPL QL IA: SIGNIFICANT CHANGE UP
IANC: 3.85 K/UL — SIGNIFICANT CHANGE UP (ref 1.8–7.4)
IMM GRANULOCYTES NFR BLD AUTO: 0.3 % — SIGNIFICANT CHANGE UP (ref 0–0.9)
LYMPHOCYTES # BLD AUTO: 2.44 K/UL — SIGNIFICANT CHANGE UP (ref 1–3.3)
LYMPHOCYTES # BLD AUTO: 33.6 % — SIGNIFICANT CHANGE UP (ref 13–44)
MAGNESIUM SERPL-MCNC: 1.7 MG/DL — SIGNIFICANT CHANGE UP (ref 1.6–2.6)
MCHC RBC-ENTMCNC: 28.5 PG — SIGNIFICANT CHANGE UP (ref 27–34)
MCHC RBC-ENTMCNC: 32.8 GM/DL — SIGNIFICANT CHANGE UP (ref 32–36)
MCV RBC AUTO: 87 FL — SIGNIFICANT CHANGE UP (ref 80–100)
MONOCYTES # BLD AUTO: 0.76 K/UL — SIGNIFICANT CHANGE UP (ref 0–0.9)
MONOCYTES NFR BLD AUTO: 10.5 % — SIGNIFICANT CHANGE UP (ref 2–14)
NEUTROPHILS # BLD AUTO: 3.85 K/UL — SIGNIFICANT CHANGE UP (ref 1.8–7.4)
NEUTROPHILS NFR BLD AUTO: 52.8 % — SIGNIFICANT CHANGE UP (ref 43–77)
NRBC # BLD: 0 /100 WBCS — SIGNIFICANT CHANGE UP (ref 0–0)
NRBC # FLD: 0 K/UL — SIGNIFICANT CHANGE UP (ref 0–0)
PHOSPHATE SERPL-MCNC: 3.3 MG/DL — SIGNIFICANT CHANGE UP (ref 2.5–4.5)
PLATELET # BLD AUTO: 154 K/UL — SIGNIFICANT CHANGE UP (ref 150–400)
POTASSIUM SERPL-MCNC: 3.6 MMOL/L — SIGNIFICANT CHANGE UP (ref 3.5–5.3)
POTASSIUM SERPL-SCNC: 3.6 MMOL/L — SIGNIFICANT CHANGE UP (ref 3.5–5.3)
RBC # BLD: 5.08 M/UL — SIGNIFICANT CHANGE UP (ref 4.2–5.8)
RBC # FLD: 13.8 % — SIGNIFICANT CHANGE UP (ref 10.3–14.5)
SODIUM SERPL-SCNC: 139 MMOL/L — SIGNIFICANT CHANGE UP (ref 135–145)
WBC # BLD: 7.27 K/UL — SIGNIFICANT CHANGE UP (ref 3.8–10.5)
WBC # FLD AUTO: 7.27 K/UL — SIGNIFICANT CHANGE UP (ref 3.8–10.5)

## 2024-04-26 PROCEDURE — 93010 ELECTROCARDIOGRAM REPORT: CPT

## 2024-04-26 PROCEDURE — 99232 SBSQ HOSP IP/OBS MODERATE 35: CPT | Mod: GC

## 2024-04-26 PROCEDURE — 99233 SBSQ HOSP IP/OBS HIGH 50: CPT

## 2024-04-26 PROCEDURE — 93459 L HRT ART/GRFT ANGIO: CPT | Mod: 26,59

## 2024-04-26 PROCEDURE — 92941 PRQ TRLML REVSC TOT OCCL AMI: CPT | Mod: LC

## 2024-04-26 PROCEDURE — 99255 IP/OBS CONSLTJ NEW/EST HI 80: CPT | Mod: GC

## 2024-04-26 RX ORDER — SACUBITRIL AND VALSARTAN 24; 26 MG/1; MG/1
1 TABLET, FILM COATED ORAL
Qty: 60 | Refills: 0
Start: 2024-04-26 | End: 2024-05-25

## 2024-04-26 RX ORDER — INSULIN LISPRO 100/ML
VIAL (ML) SUBCUTANEOUS
Refills: 0 | Status: DISCONTINUED | OUTPATIENT
Start: 2024-04-26 | End: 2024-04-27

## 2024-04-26 RX ORDER — ASPIRIN/CALCIUM CARB/MAGNESIUM 324 MG
325 TABLET ORAL DAILY
Refills: 0 | Status: DISCONTINUED | OUTPATIENT
Start: 2024-04-27 | End: 2024-04-27

## 2024-04-26 RX ORDER — METOPROLOL TARTRATE 50 MG
50 TABLET ORAL ONCE
Refills: 0 | Status: COMPLETED | OUTPATIENT
Start: 2024-04-26 | End: 2024-04-26

## 2024-04-26 RX ORDER — METOPROLOL TARTRATE 50 MG
100 TABLET ORAL DAILY
Refills: 0 | Status: DISCONTINUED | OUTPATIENT
Start: 2024-04-27 | End: 2024-04-27

## 2024-04-26 RX ORDER — INSULIN LISPRO 100/ML
VIAL (ML) SUBCUTANEOUS AT BEDTIME
Refills: 0 | Status: DISCONTINUED | OUTPATIENT
Start: 2024-04-26 | End: 2024-04-27

## 2024-04-26 RX ORDER — DAPAGLIFLOZIN 10 MG/1
1 TABLET, FILM COATED ORAL
Qty: 30 | Refills: 0
Start: 2024-04-26 | End: 2024-05-25

## 2024-04-26 RX ORDER — SPIRONOLACTONE 25 MG/1
25 TABLET, FILM COATED ORAL DAILY
Refills: 0 | Status: DISCONTINUED | OUTPATIENT
Start: 2024-04-27 | End: 2024-04-27

## 2024-04-26 RX ADMIN — Medication 4: at 21:35

## 2024-04-26 RX ADMIN — LOSARTAN POTASSIUM 50 MILLIGRAM(S): 100 TABLET, FILM COATED ORAL at 05:08

## 2024-04-26 RX ADMIN — Medication 50 MILLIGRAM(S): at 05:08

## 2024-04-26 RX ADMIN — Medication 40 MILLIGRAM(S): at 17:11

## 2024-04-26 RX ADMIN — LORATADINE 10 MILLIGRAM(S): 10 TABLET ORAL at 17:11

## 2024-04-26 RX ADMIN — HEPARIN SODIUM 1100 UNIT(S)/HR: 5000 INJECTION INTRAVENOUS; SUBCUTANEOUS at 05:11

## 2024-04-26 RX ADMIN — ATORVASTATIN CALCIUM 80 MILLIGRAM(S): 80 TABLET, FILM COATED ORAL at 21:36

## 2024-04-26 RX ADMIN — Medication 40 MILLIGRAM(S): at 05:08

## 2024-04-26 RX ADMIN — Medication 81 MILLIGRAM(S): at 10:13

## 2024-04-26 RX ADMIN — Medication 2: at 18:14

## 2024-04-26 RX ADMIN — Medication 50 MILLIGRAM(S): at 17:11

## 2024-04-26 RX ADMIN — HEPARIN SODIUM 1100 UNIT(S)/HR: 5000 INJECTION INTRAVENOUS; SUBCUTANEOUS at 02:05

## 2024-04-26 RX ADMIN — INSULIN GLARGINE 48 UNIT(S): 100 INJECTION, SOLUTION SUBCUTANEOUS at 21:34

## 2024-04-26 RX ADMIN — Medication 1 TABLET(S): at 17:11

## 2024-04-26 RX ADMIN — Medication 16 UNIT(S): at 18:09

## 2024-04-26 RX ADMIN — PANTOPRAZOLE SODIUM 40 MILLIGRAM(S): 20 TABLET, DELAYED RELEASE ORAL at 05:08

## 2024-04-26 RX ADMIN — CLOPIDOGREL BISULFATE 75 MILLIGRAM(S): 75 TABLET, FILM COATED ORAL at 10:14

## 2024-04-26 NOTE — CHART NOTE - NSCHARTNOTEFT_GEN_A_CORE
64y Male s/p cardiac cath x today for right groin check. Pt currently denies any complaints.    Right groin site: Dressing in place c/d/i. No hematoma present. No edema/swelling/discoloration/coldness of extremity. Pulses and sensation intact.     Will cont to monitor

## 2024-04-26 NOTE — PROGRESS NOTE ADULT - ASSESSMENT
64 y.o. M w/ a hx of HFrEF, EF 33%, HTN, DM2, CAD s/p CABG (2005) and stent (2012) who presents with chest pressure and SOB found to have an NSTEMI and decompensated HF v HTN emergency.

## 2024-04-26 NOTE — CONSULT NOTE ADULT - ASSESSMENT
The patient is a 64y Male with PMH of CHF, CAD, T2DM, HTN, HLD, admitted with an NSTEMI s/p PCI. Endocrinology consulted for uncontrolled T2DM.    #Uncontrolled Type 2 Diabetes Mellitus   - Follows with: used to follow with endo Dr. Rain, but has not been seen in 2 years. PCP has been prescribing his meds recently.  - A1C with Estimated Average Glucose Result: 11.1 % (04-26-24)  - Home regimen: lantus (glargine) 60 units qhs; Admelog (lispro) 20 units TIDAC; glipizide 10mg BID; farxiga 10mg daily; metformin 1000mg BID; trulicity 1.5mg weekly  - eGFR: 102 mL/min/1.73m2 (04-26-24)  - Weight (kg): 86.2 (04-25-24)  - glucose 400s yesterday, improving today, no evidence of DKA on labs      INPATIENT PLAN:  - Recommend c/w Lantus 48 units QHS   - Recommend c/w Admelog 16 units TID before meals (HOLD if NPO or not eating)  - Recommend increase to moderate dose admelog correction scale TIDQAC and separate moderate dose scale QHS  - Please check FSG before meals and QHS, or q6h while NPO  - Inpatient glucose goals: <140 pre-meal, <180 random  - consistent carb diet when eating  - nutrition consult placed      DISCHARGE PLANNING:  - Discharge recs pending clinical course: Likely c/w basal/bolus insulin (dose TBD). Increase Trulicity to 3mg weekly. C/w metformin. C/w Farxiga. STOP glipizide.  - Patient would benefit from a CGM on discharge. Please prescribe Freestyle Urbano 3 sensor, dispense 2 for 30 day supply. If patient has iphone can download urbano 3 stefani, otherwise also Rx 1 reader..  - will need Endocrinology follow up. Please provide clinic info in [place] paperwork for patient to make an appointment: [facility] [place] ([facility] [address] [place], [place], [address]. Phone number: [phone]    #Hypertension  - Goal BP <130/80  - on metoprolol, spironolactone, and losartan  - Management as per primary team  - check urine microalbumin level as outpatient    #Hyperlipidemia  - LDL goal <70  - Last LDL: 113 mg/dL (04-25-24)  - on atorvastatin 80mg daily  - check lipid panel as outpatient on a yearly basis    Ramos Strong MD  Endocrine Fellow  For nonurgent matters (including consults or followup questions), please email [email or web address] or [email or web address]. For urgent matters, please call answering service at [phone] (weekdays), [phone] (nights/weekends).    The patient is a 64y Male with PMH of CHF, CAD, T2DM, HTN, HLD, admitted with an NSTEMI s/p PCI. Endocrinology consulted for uncontrolled T2DM.    #Uncontrolled Type 2 Diabetes Mellitus   - Follows with: used to follow with endo Dr. Rain, but has not been seen in 2 years. PCP has been prescribing his meds recently.  - A1C with Estimated Average Glucose Result: 11.1 % (04-26-24)  - Home regimen: lantus (glargine) 60 units qhs; Admelog (lispro) 20 units TIDAC; glipizide 10mg BID; farxiga 10mg daily; metformin 1000mg BID; trulicity 1.5mg weekly  - eGFR: 102 mL/min/1.73m2 (04-26-24)  - Weight (kg): 86.2 (04-25-24)  - glucose 400s yesterday, improving today, no evidence of DKA on labs      INPATIENT PLAN:  - Recommend c/w Lantus 48 units QHS   - Recommend c/w Admelog 16 units TID before meals (HOLD if NPO or not eating)  - Recommend increase to moderate dose admelog correction scale TIDQAC and separate moderate dose scale QHS  - Please check FSG before meals and QHS, or q6h while NPO  - Inpatient glucose goals: <140 pre-meal, <180 random  - consistent carb diet when eating  - nutrition consult placed      DISCHARGE PLANNING:  - Discharge recs pending clinical course: Likely c/w basal/bolus insulin (dose TBD). Increase Trulicity to 3mg weekly. C/w metformin. C/w Farxiga. STOP glipizide.  - Patient would benefit from a CGM on discharge. Please prescribe Freestyle Urbano 3 sensor, dispense 2 for 30 day supply. If patient has iphone can download urbano 3 stefani, otherwise also Rx 1 reader..  - will need Endocrinology follow up. Please provide clinic info in [place] paperwork for patient to make an appointment: [facility] [place] ([facility] [address] [place], [place], [address]. Phone number: [phone]    #Hypertension  - Goal BP <130/80  - on metoprolol, spironolactone, and losartan  - Management as per primary team  - check urine microalbumin level as outpatient    #Hyperlipidemia  - LDL goal <70  - Last LDL: 113 mg/dL (04-25-24)  - on atorvastatin 80mg daily  - check lipid panel as outpatient on a yearly basis    Recommendations communicated to primary team    Ramos Strong MD  Endocrine Fellow  For nonurgent matters (including consults or followup questions), please email [email or web address] or [email or web address]. For urgent matters, please call answering service at [phone] (weekdays), [phone] (nights/weekends).

## 2024-04-26 NOTE — PROGRESS NOTE ADULT - ATTENDING COMMENTS
The patient was seen and examined with the Cardiology Consultation Teaching Service.     No overnight events    No chest pain  No dyspnea, orthopnea or PND  No palpitations or dizziness     PMH/PSH:  Coronary artery disease    4-vessel coronary artery bypass 2005    Percutaneous coronary intervention, 2013    Myocardial infarction  Chronic heart failure with reduced LVEF 30%    Primary prevention ICD implantation   Diabetes on insulin  Hypertension  Dyslipidemia  Chronic obstructive pulmonary disease  GERD    Comfortable-appearing man in no acute distress  Alert and oriented  Afebrile  Vital signs stable  I/O net negative 0.6L  JVP was difficult to assess  Clear lungs  Normal heart sounds  Extremities are warm and perfused  No peripheral edema     Normal blood counts      Hs-troponin 662, 426, 214  Pro-BNP 1128    , , HDL 39, , NHDL 147  HbA1c 11.1    Echocardiography demonstrates severely reduced systolic function, LVEF 33% with regional wall motion abnormalities. LA is mildly dilated, moderate diastolic dysfunction. Echocardiographic evidence of increased filling pressures. No significant valve disease.    Impression and Recommendations:   64-year-old man with coronary artery disease, prior CABG and PCI, chronic heart failure with LVEF 30%, who presented with worsening chest pain and dyspnea in the context of intermittent medication adherence.     The patient's presentation is most concerning for acute decompensated heart failure in the setting of medication non-adherence, though acute coronary syndrome complicated by heart failure remains on the differential.    Please continue intravenous diuresis for a goal net negative 1-2L.     Continue losartan and consider transition to sacubitril-valsartan if the patient is able to obtain it as an outpatient.  Consolidate the patient's metoprolol titrate to metoprolol succinate.     Please start spironolactone 25mg daily.    Please evaluate whether the patient will have access to SGLT2-inhibitor as an outpatient. If he does, I would plan on starting dapagliflozin prior to discharge.    Will plan to proceed with coronary angiography and graft angiography today.    Apolinar Suggs MD FAC FACP  Cardiology  [phone]

## 2024-04-26 NOTE — CONSULT NOTE ADULT - ATTENDING COMMENTS
The patient was seen and examined with the Cardiology Consultation Teaching Service.     He is a 64-year-old man with coronary artery disease, prior CABG and PCI, chronic heart failure with LVEF 30%, who presented with worsening chest pain and dyspnea in the context of intermittent medication adherence.     The patient was initially managed with non-invasive ventilation and intravenous nitroglycerin, but was subsequently weaned.    No current chest pain  No dyspnea at rest  No orthopnea or PND  No palpitations or syncope    PMH/PSH:  Coronary artery disease    4-vessel coronary artery bypass 2005    Percutaneous coronary intervention, 2013    Myocardial infarction  Chronic heart failure with reduced LVEF 30%    Primary prevention ICD implantation   Diabetes on insulin  Hypertension  Dyslipidemia  Chronic obstructive pulmonary disease  GERD    Comfortable-appearing man in no acute distress  Alert and oriented  Afebrile  Vital signs stable  JVP was difficult to assess  Bilateral scattered rales  Normal heart sounds  Extremities are warm and perfused  No peripheral edema     Leukocytosis 15K  GFR 96, 104    Hs-troponin 662, 426, 214  Pro-BNP 1128    ECG demonstrates sinus rhythm with LVH and secondary repolarization changes  CXR with left basilar opacity    Echocardiography demonstrates severely reduced systolic function, LVEF 33% with regional wall motion abnormalities. LA is mildly dilated, moderate diastolic dysfunction. Echocardiographic evidence of increased filling pressures. No significant valve disease.    Impression and Recommendations:   64-year-old man with coronary artery disease, prior CABG and PCI, chronic heart failure with LVEF 30%, who presented with worsening chest pain and dyspnea in the context of intermittent medication adherence.     The patient's presentation is most concerning for acute decompensated heart failure in the setting of medication non-adherence, though acute coronary syndrome complicated by heart failure remains on the differential.    Please continue intravenous diuresis for a goal net negative 1-2L.   I would discontinue lisinopril and start losartan for the next two days.   Can consider transition to sacubitril-valsartan at that time.   I would also consolidate the patient's metoprolol to metoprolol succinate 50mg daily.     Please evaluate whether the patient will have access to SGLT2-inhibitor as an outpatient. If he does, I would plan on starting dapagliflozin prior to discharge.    Once the patient's volume status is improved, we will plan for coronary angiography. Continue Aspirin, clopidogrel, atorvastatin and unfractionated heparin for treatment of possible ACS.    Apolinar Suggs MD FAC FACP  Cardiology  [phone]
64M DM2 uncontrolled HbA1c 11.1%  Inpatient agree with basal bolus insulin as outlined.  For discharge stop glipizide, raise Trulicity and keep basal bolus insulin dose TBD, Farxiga, metformin.  [place] [facility] [place].  Discussed need to reduce carb content in diet (eats a lot of roti, chapati).  Endocrine team consulted for uncontrolled diabetes. Patient is high risk with high level decision making due to uncontrolled diabetes which places patient at high risk for cardiovascular and cerebrovascular events. Patient with lability of glucose requiring close monitoring and insulin adjustments.    Abril Carter MD  Division of Endocrinology  Pager: [phone]    If after 6PM or before 9AM, or on weekends/holidays, please call endocrine answering service for assistance ([phone]).  For nonurgent matters email [email or web address] for assistance.

## 2024-04-26 NOTE — PROGRESS NOTE ADULT - ASSESSMENT
Patient is a 64 year old male with PMHx HFrEF (last EF 30%), HTN, DM2, HLD, CAD s/p MI in November 2005 with four-vessel bypass in 2005 at [facility] here for SOB and CP.     #New BIPAP requirement  #SOB  #HFrEF (EF 30%)  Most likely in the setting of slight volume overload  - Recommend admission to telemetry for continuous cardiac monitoring  - Continue lasix 40mg IV BID  - Switch metoprolol to succinate 100mg daily  - Lisinopril on hold, losartan 50mg started, check if entresto is covered by insurance  - Replete lytes to maintain K>4 and Mg>2  - Continue symptomatic management per primary medicine team     #H/O HTN  - On Lisinopril 2.5 mg, Lopressor 25 mg BID, Lasix 40 mg PO BID at home  - Home Lisinopril on hold, losartan 50mg started, check if entresto is covered by insurance  - Continue metoprolol succinate 100mg daily    #H/O CAD  - Troponin and CKMB elevation  - Patient without active chest pain at time of interview  - s/p ACS load, continue ASA 81, Plavix 75 mg, Lipitor 80  - Patient to be taken to the cath lab today, keep NPO      Chan Vila MD  Cardiology fellow Patient is a 64 year old male with PMHx HFrEF (last EF 30%), HTN, DM2, HLD, CAD s/p MI in November 2005 with four-vessel bypass in 2005 at [facility] here for SOB and CP.     #New BIPAP requirement  #SOB  #HFrEF (EF 30%)  Most likely in the setting of slight volume overload  - Recommend admission to telemetry for continuous cardiac monitoring  - Continue lasix 40mg IV BID  - Switch metoprolol to succinate 100mg daily  - Lisinopril on hold, losartan 50mg started, check if entresto is covered by insurance  - Add Spironolactone 25mg daily  - Replete lytes to maintain K>4 and Mg>2  - Continue symptomatic management per primary medicine team     #H/O HTN  - On Lisinopril 2.5 mg, Lopressor 25 mg BID, Lasix 40 mg PO BID at home  - Home Lisinopril on hold, losartan 50mg started, check if entresto is covered by insurance  - Continue metoprolol succinate 100mg daily    #H/O CAD  - Troponin and CKMB elevation  - Patient without active chest pain at time of interview  - s/p ACS load, continue ASA 81, Plavix 75 mg, Lipitor 80  - Patient to be taken to the cath lab today, keep NPO      Chan Vila MD  Cardiology fellow

## 2024-04-26 NOTE — CONSULT NOTE ADULT - SUBJECTIVE AND OBJECTIVE BOX
HPI:  64 y.o. M w/ a hx of HFrEF, EF 33%, HTN, DM2, CAD s/p CABG (2005) and stent (2012) who presents with chest pressure and SOB.   Patient reports he was in his USOH until the day of presentation. At 3pm, he was resting when he noted some mild CP and SOB which resolved after two hours. Then at 10:30pm, patient noted acute onset severe 8/10 burning epigastric/chest pain associated with severe SOB which prompted calling EMS.  Patient reports he has not followed with a Cardiologist in at least 2 years, PCP has been prescribing medications. He reports adherence to all of his medications aside from Lasix which he only takes on a PRN basis- usually 1x every 1-2 weeks.   In the ED, patient was /104, he was placed on Bipap and started on a nitro drip.   Patient currently denies any CP, SOB and feels improved, no complaints.  (25 Apr 2024 15:16)      DIABETES HX:  - History/diagnosis: T2DM, diagnosed 20 years ago  - Microvascular complications:   [  ] nephropathy, [ x] retinopathy, [  ] neuropathy  - Macrovascular complications: [ x] CAD,  [  ] CVA  - Follows with: used to follow with endo Dr. Rain, but has not been seen in 2 years. PCP has been prescribing his meds recently.  - A1C with Estimated Average Glucose Result: 11.1 % (04-26-24)  - Home regimen: lantus (glargine) 60 units qhs; Admelog (lispro) 20 units TIDAC; glipizide 10mg BID; farxiga 10mg daily; metformin 1000mg BID; trulicity 1.5mg weekly  - Adherence: patient denies missing doses  - Blood sugar: pt checks FSG 2x/day. Usually 180-220 in the AM (fasting). Usually >200 before dinner  - Hypoglycemia episodes: none  - Diet/lifestyle: eats  food including roti/walls      PAST MEDICAL & SURGICAL HISTORY:  HLD (hyperlipidemia)      DM (diabetes mellitus)      CAD (coronary artery disease)      CHF (congestive heart failure)      COPD (chronic obstructive pulmonary disease)      HTN (hypertension)      GERD (gastroesophageal reflux disease)      Stented coronary artery  2013      S/P CABG (coronary artery bypass graft)  2005      S/P arthroscopic knee surgery      H/O eye surgery          FAMILY HISTORY:  No pertinent family history in first degree relatives        Social History: Lives at home with wife   Ambulates independently   No tobacco, alcohol or drug use    Outpatient Medications:  lantus (glargine) 60 units qhs; Admelog (lispro) 20 units TIDAC; glipizide 10mg BID; farxiga 10mg daily; metformin 1000mg BID; trulicity 1.5mg weekly    MEDICATIONS  (STANDING):  atorvastatin 80 milliGRAM(s) Oral at bedtime  clopidogrel Tablet 75 milliGRAM(s) Oral daily  cyanocobalamin 1000 MICROGram(s) Oral daily  dextrose 10% Bolus 125 milliLiter(s) IV Bolus once  dextrose 5%. 1000 milliLiter(s) (100 mL/Hr) IV Continuous <Continuous>  dextrose 5%. 1000 milliLiter(s) (50 mL/Hr) IV Continuous <Continuous>  dextrose 50% Injectable 12.5 Gram(s) IV Push once  dextrose 50% Injectable 25 Gram(s) IV Push once  furosemide   Injectable 40 milliGRAM(s) IV Push two times a day  glucagon  Injectable 1 milliGRAM(s) IntraMuscular once  insulin glargine Injectable (LANTUS) 48 Unit(s) SubCutaneous at bedtime  insulin lispro (ADMELOG) corrective regimen sliding scale   SubCutaneous three times a day before meals  insulin lispro (ADMELOG) corrective regimen sliding scale   SubCutaneous at bedtime  insulin lispro Injectable (ADMELOG) 16 Unit(s) SubCutaneous three times a day before meals  loratadine 10 milliGRAM(s) Oral daily  losartan 50 milliGRAM(s) Oral daily  metoprolol tartrate 50 milliGRAM(s) Oral two times a day  multivitamin 1 Tablet(s) Oral daily  pantoprazole    Tablet 40 milliGRAM(s) Oral before breakfast    MEDICATIONS  (PRN):  acetaminophen     Tablet .. 650 milliGRAM(s) Oral every 6 hours PRN Temp greater or equal to 38C (100.4F), Mild Pain (1 - 3), Moderate Pain (4 - 6)  dextrose Oral Gel 15 Gram(s) Oral once PRN Blood Glucose LESS THAN 70 milliGRAM(s)/deciliter      Allergies    penicillin (Rash)    Intolerances      Review of Systems:  Constitutional: No fever  Eyes: No blurry vision  Neuro: No tremors  HEENT: No pain  Cardiovascular: No chest pain, palpitations  Respiratory: No SOB, no cough  GI: No nausea, vomiting, abdominal pain  : No dysuria  Skin: no rash  Psych: no depression  Endocrine: no polyuria, polydipsia  Hem/lymph: no swelling  Osteoporosis: no fractures    ALL OTHER SYSTEMS REVIEWED AND NEGATIVE    PHYSICAL EXAM:  VITALS: T(C): 36.6 (04-26-24 @ 05:00)  T(F): 97.9 (04-26-24 @ 05:00), Max: 98.2 (04-25-24 @ 22:00)  HR: 67 (04-26-24 @ 05:00) (67 - 84)  BP: 120/66 (04-26-24 @ 05:00) (120/66 - 167/70)  RR:  (17 - 18)  SpO2:  (100% - 100%)  Wt(kg): --  GENERAL: NAD, well-groomed, well-developed  EYES: No proptosis, no lid lag, anicteric  HEENT:  Atraumatic, Normocephalic, moist mucous membranes  RESPIRATORY: Normal respiratory effort; no audible wheezing  SKIN: Dry, intact, No rashes or lesions  MUSCULOSKELETAL: Full range of motion, normal strength  NEURO: sensation intact, extraocular movements intact, no tremor  PSYCH: Alert and oriented x 3, normal affect, normal mood  CUSHING'S SIGNS: no striae                          14.5   7.27  )-----------( 154      ( 26 Apr 2024 04:32 )             44.2       04-26    139  |  100  |  18  ----------------------------<  252<H>  3.6   |  25  |  0.73    eGFR: 102    Ca    9.1      04-26  Mg     1.70     04-26  Phos  3.3     04-26    TPro  7.1  /  Alb  4.0  /  TBili  1.1  /  DBili  x   /  AST  89<H>  /  ALT  42<H>  /  AlkPhos  121<H>  04-25      A1C with Estimated Average Glucose Result: 11.1 % (04-26-24 @ 04:32)      CAPILLARY BLOOD GLUCOSE      POCT Blood Glucose.: 176 mg/dL (26 Apr 2024 13:29)  POCT Blood Glucose.: 244 mg/dL (26 Apr 2024 07:54)  POCT Blood Glucose.: 349 mg/dL (25 Apr 2024 22:43)  POCT Blood Glucose.: 457 mg/dL (25 Apr 2024 17:55)  POCT Blood Glucose.: 426 mg/dL (25 Apr 2024 17:02)      Thyroid Function Tests:  acetaminophen     Tablet .. 650 milliGRAM(s) Oral every 6 hours PRN  atorvastatin 80 milliGRAM(s) Oral at bedtime  clopidogrel Tablet 75 milliGRAM(s) Oral daily  cyanocobalamin 1000 MICROGram(s) Oral daily  dextrose 10% Bolus 125 milliLiter(s) IV Bolus once  dextrose 5%. 1000 milliLiter(s) IV Continuous <Continuous>  dextrose 5%. 1000 milliLiter(s) IV Continuous <Continuous>  dextrose 50% Injectable 12.5 Gram(s) IV Push once  dextrose 50% Injectable 25 Gram(s) IV Push once  dextrose Oral Gel 15 Gram(s) Oral once PRN  furosemide   Injectable 40 milliGRAM(s) IV Push two times a day  glucagon  Injectable 1 milliGRAM(s) IntraMuscular once  insulin glargine Injectable (LANTUS) 48 Unit(s) SubCutaneous at bedtime  insulin lispro (ADMELOG) corrective regimen sliding scale   SubCutaneous three times a day before meals  insulin lispro (ADMELOG) corrective regimen sliding scale   SubCutaneous at bedtime  insulin lispro Injectable (ADMELOG) 16 Unit(s) SubCutaneous three times a day before meals  loratadine 10 milliGRAM(s) Oral daily  losartan 50 milliGRAM(s) Oral daily  metoprolol tartrate 50 milliGRAM(s) Oral two times a day  multivitamin 1 Tablet(s) Oral daily  pantoprazole    Tablet 40 milliGRAM(s) Oral before breakfast      04-25 Chol 186 Direct LDL -- LDL calculated 113<H> HDL 39<L> Trig 168<H>    Radiology:

## 2024-04-27 ENCOUNTER — TRANSCRIPTION ENCOUNTER (OUTPATIENT)
Age: 64
End: 2024-04-27

## 2024-04-27 VITALS
DIASTOLIC BLOOD PRESSURE: 78 MMHG | HEART RATE: 71 BPM | RESPIRATION RATE: 18 BRPM | OXYGEN SATURATION: 100 % | SYSTOLIC BLOOD PRESSURE: 117 MMHG | TEMPERATURE: 98 F

## 2024-04-27 LAB
ANION GAP SERPL CALC-SCNC: 14 MMOL/L — SIGNIFICANT CHANGE UP (ref 7–14)
BUN SERPL-MCNC: 24 MG/DL — HIGH (ref 7–23)
CALCIUM SERPL-MCNC: 9.2 MG/DL — SIGNIFICANT CHANGE UP (ref 8.4–10.5)
CHLORIDE SERPL-SCNC: 100 MMOL/L — SIGNIFICANT CHANGE UP (ref 98–107)
CO2 SERPL-SCNC: 26 MMOL/L — SIGNIFICANT CHANGE UP (ref 22–31)
CREAT SERPL-MCNC: 0.85 MG/DL — SIGNIFICANT CHANGE UP (ref 0.5–1.3)
EGFR: 98 ML/MIN/1.73M2 — SIGNIFICANT CHANGE UP
GLUCOSE BLDC GLUCOMTR-MCNC: 347 MG/DL — HIGH (ref 70–99)
GLUCOSE BLDC GLUCOMTR-MCNC: 382 MG/DL — HIGH (ref 70–99)
GLUCOSE SERPL-MCNC: 189 MG/DL — HIGH (ref 70–99)
HCT VFR BLD CALC: 46.1 % — SIGNIFICANT CHANGE UP (ref 39–50)
HGB BLD-MCNC: 15.4 G/DL — SIGNIFICANT CHANGE UP (ref 13–17)
MAGNESIUM SERPL-MCNC: 1.7 MG/DL — SIGNIFICANT CHANGE UP (ref 1.6–2.6)
MCHC RBC-ENTMCNC: 29 PG — SIGNIFICANT CHANGE UP (ref 27–34)
MCHC RBC-ENTMCNC: 33.4 GM/DL — SIGNIFICANT CHANGE UP (ref 32–36)
MCV RBC AUTO: 86.8 FL — SIGNIFICANT CHANGE UP (ref 80–100)
NRBC # BLD: 0 /100 WBCS — SIGNIFICANT CHANGE UP (ref 0–0)
NRBC # FLD: 0 K/UL — SIGNIFICANT CHANGE UP (ref 0–0)
PLATELET # BLD AUTO: 175 K/UL — SIGNIFICANT CHANGE UP (ref 150–400)
POTASSIUM SERPL-MCNC: 3.7 MMOL/L — SIGNIFICANT CHANGE UP (ref 3.5–5.3)
POTASSIUM SERPL-SCNC: 3.7 MMOL/L — SIGNIFICANT CHANGE UP (ref 3.5–5.3)
RBC # BLD: 5.31 M/UL — SIGNIFICANT CHANGE UP (ref 4.2–5.8)
RBC # FLD: 14 % — SIGNIFICANT CHANGE UP (ref 10.3–14.5)
SODIUM SERPL-SCNC: 140 MMOL/L — SIGNIFICANT CHANGE UP (ref 135–145)
WBC # BLD: 7.95 K/UL — SIGNIFICANT CHANGE UP (ref 3.8–10.5)
WBC # FLD AUTO: 7.95 K/UL — SIGNIFICANT CHANGE UP (ref 3.8–10.5)

## 2024-04-27 PROCEDURE — 99232 SBSQ HOSP IP/OBS MODERATE 35: CPT

## 2024-04-27 PROCEDURE — 99239 HOSP IP/OBS DSCHRG MGMT >30: CPT

## 2024-04-27 RX ORDER — ASPIRIN/CALCIUM CARB/MAGNESIUM 324 MG
1 TABLET ORAL
Qty: 30 | Refills: 0
Start: 2024-04-27 | End: 2024-05-26

## 2024-04-27 RX ORDER — FUROSEMIDE 40 MG
40 TABLET ORAL
Refills: 0 | Status: DISCONTINUED | OUTPATIENT
Start: 2024-04-27 | End: 2024-04-27

## 2024-04-27 RX ORDER — INSULIN LISPRO 100/ML
16 VIAL (ML) SUBCUTANEOUS
Qty: 0 | Refills: 0 | DISCHARGE
Start: 2024-04-27

## 2024-04-27 RX ORDER — INSULIN GLARGINE 100 [IU]/ML
54 INJECTION, SOLUTION SUBCUTANEOUS AT BEDTIME
Refills: 0 | Status: DISCONTINUED | OUTPATIENT
Start: 2024-04-27 | End: 2024-04-27

## 2024-04-27 RX ORDER — DULAGLUTIDE 4.5 MG/.5ML
3 INJECTION, SOLUTION SUBCUTANEOUS
Qty: 4 | Refills: 0
Start: 2024-04-27 | End: 2024-05-26

## 2024-04-27 RX ORDER — INSULIN GLARGINE 100 [IU]/ML
48 INJECTION, SOLUTION SUBCUTANEOUS
Qty: 0 | Refills: 0 | DISCHARGE
Start: 2024-04-27

## 2024-04-27 RX ORDER — ASPIRIN/CALCIUM CARB/MAGNESIUM 324 MG
1 TABLET ORAL
Qty: 0 | Refills: 0 | DISCHARGE

## 2024-04-27 RX ORDER — SPIRONOLACTONE 25 MG/1
1 TABLET, FILM COATED ORAL
Qty: 30 | Refills: 0
Start: 2024-04-27 | End: 2024-05-26

## 2024-04-27 RX ORDER — MAGNESIUM SULFATE 500 MG/ML
1 VIAL (ML) INJECTION ONCE
Refills: 0 | Status: DISCONTINUED | OUTPATIENT
Start: 2024-04-27 | End: 2024-04-27

## 2024-04-27 RX ORDER — METOPROLOL TARTRATE 50 MG
1 TABLET ORAL
Qty: 30 | Refills: 0
Start: 2024-04-27 | End: 2024-05-26

## 2024-04-27 RX ORDER — LOSARTAN POTASSIUM 100 MG/1
1 TABLET, FILM COATED ORAL
Qty: 30 | Refills: 0
Start: 2024-04-27 | End: 2024-05-26

## 2024-04-27 RX ORDER — FUROSEMIDE 40 MG
1 TABLET ORAL
Refills: 0 | DISCHARGE

## 2024-04-27 RX ORDER — POTASSIUM CHLORIDE 20 MEQ
40 PACKET (EA) ORAL ONCE
Refills: 0 | Status: COMPLETED | OUTPATIENT
Start: 2024-04-27 | End: 2024-04-27

## 2024-04-27 RX ORDER — DULAGLUTIDE 4.5 MG/.5ML
1.5 INJECTION, SOLUTION SUBCUTANEOUS
Refills: 0 | DISCHARGE

## 2024-04-27 RX ORDER — METOPROLOL TARTRATE 50 MG
1 TABLET ORAL
Refills: 0 | DISCHARGE

## 2024-04-27 RX ORDER — INSULIN GLARGINE 100 [IU]/ML
60 INJECTION, SOLUTION SUBCUTANEOUS
Refills: 0 | DISCHARGE

## 2024-04-27 RX ORDER — INSULIN LISPRO 100/ML
20 VIAL (ML) SUBCUTANEOUS
Refills: 0 | Status: DISCONTINUED | OUTPATIENT
Start: 2024-04-27 | End: 2024-04-27

## 2024-04-27 RX ORDER — INSULIN LISPRO 100/ML
18 VIAL (ML) SUBCUTANEOUS
Refills: 0 | Status: DISCONTINUED | OUTPATIENT
Start: 2024-04-27 | End: 2024-04-27

## 2024-04-27 RX ORDER — LISINOPRIL 2.5 MG/1
1 TABLET ORAL
Refills: 0 | DISCHARGE

## 2024-04-27 RX ORDER — FUROSEMIDE 40 MG
1 TABLET ORAL
Qty: 60 | Refills: 0
Start: 2024-04-27 | End: 2024-05-26

## 2024-04-27 RX ORDER — INSULIN LISPRO 100/ML
20 VIAL (ML) SUBCUTANEOUS
Qty: 0 | Refills: 0 | DISCHARGE

## 2024-04-27 RX ADMIN — CLOPIDOGREL BISULFATE 75 MILLIGRAM(S): 75 TABLET, FILM COATED ORAL at 11:47

## 2024-04-27 RX ADMIN — Medication 325 MILLIGRAM(S): at 11:46

## 2024-04-27 RX ADMIN — LORATADINE 10 MILLIGRAM(S): 10 TABLET ORAL at 11:46

## 2024-04-27 RX ADMIN — Medication 18 UNIT(S): at 12:43

## 2024-04-27 RX ADMIN — Medication 100 MILLIGRAM(S): at 05:16

## 2024-04-27 RX ADMIN — LOSARTAN POTASSIUM 50 MILLIGRAM(S): 100 TABLET, FILM COATED ORAL at 05:09

## 2024-04-27 RX ADMIN — Medication 8: at 12:44

## 2024-04-27 RX ADMIN — Medication 40 MILLIEQUIVALENT(S): at 11:48

## 2024-04-27 RX ADMIN — Medication 40 MILLIGRAM(S): at 05:09

## 2024-04-27 RX ADMIN — Medication 10: at 08:40

## 2024-04-27 RX ADMIN — PANTOPRAZOLE SODIUM 40 MILLIGRAM(S): 20 TABLET, DELAYED RELEASE ORAL at 05:09

## 2024-04-27 RX ADMIN — PREGABALIN 1000 MICROGRAM(S): 225 CAPSULE ORAL at 11:46

## 2024-04-27 RX ADMIN — SPIRONOLACTONE 25 MILLIGRAM(S): 25 TABLET, FILM COATED ORAL at 05:16

## 2024-04-27 RX ADMIN — Medication 16 UNIT(S): at 08:40

## 2024-04-27 RX ADMIN — Medication 1 TABLET(S): at 11:46

## 2024-04-27 NOTE — PROGRESS NOTE ADULT - PROBLEM SELECTOR PLAN 4
On home Lantus 60u Qhs + Lispro 20u w/ meals. Will reduce by 20% while hospitalized   - Lantus 48u Qhs + Lispro 16u w/ meals + SSI   - Diabetic diet   -A1c 11.1, endo consulted, f/u consult recs
On home Lantus 60u Qhs + Lispro 20u w/ meals. Will reduce by 20% while hospitalized   - Lantus 48u Qhs + Lispro 16u w/ meals + SSI   - Diabetic diet   -A1c 11.1, endo consulted, f/u consult recs

## 2024-04-27 NOTE — DISCHARGE NOTE PROVIDER - NSDCFUADDAPPT_GEN_ALL_CORE_FT
Endocrine clininc  [facility] [place] ([facility] [address] [place], [place], [address]. Phone number: [phone]

## 2024-04-27 NOTE — PROGRESS NOTE ADULT - PROBLEM SELECTOR PLAN 1
P/w acute onset chest pressure and severe SOB found to be HTN to 207/104 and SOB requiring Bipap. Melvin rising, last 1142. CP now resolved.  DDx: Type 1 v Type 2 in setting of decompensated HF v HTN emergency. Reports adherence to all medications aside from Lasix.   - TTE:LV globally hypokinetic w/ systolic function severely decreased w/ EF 33% w/ SWMA, IVC c/w mildly elevated RA pressure, grade 2 diastolic dysfunction   - on statin, plavix, metoprolol- patient reports good adherence   - dc Heparin drip, started on asa  - Trend Melvin to peak  [ ] LHC on 4/26 cath showed Native 3VD. Patent LIMA to mid LAD. Patent PREETI to distal RCA. Occluded SVG (likely to OM). LAD with moderate-severe disease distal to anastamosis (small caliber vessel). Elevated LVEDP  42 mmHg (Lasix IV given prior to PCI).Successful PCI to ostial Cx with ROBY for treatment of NSTEMI.   - Cardiology following, per cards ok for dc, need to start entresto as outpt
P/w acute onset chest pressure and severe SOB found to be HTN to 207/104 and SOB requiring Bipap. Melvin rising, last 1142. CP now resolved.  DDx: Type 1 v Type 2 in setting of decompensated HF v HTN emergency. Reports adherence to all medications aside from Lasix.   - TTE:LV globally hypokinetic w/ systolic function severely decreased w/ EF 33% w/ SWMA, IVC c/w mildly elevated RA pressure, grade 2 diastolic dysfunction   - on statin, plavix, metoprolol- patient reports good adherence   - dc Heparin drip   - Trend Melvin to peak  [ ] LHC on 4/26 cath showed Native 3VD. Patent LIMA to mid LAD. Patent PREETI to distal RCA. Occluded SVG (likely to OM). LAD with moderate-severe disease distal to anastamosis (small caliber vessel). Elevated LVEDP  42 mmHg (Lasix IV given prior to PCI).Successful PCI to ostial Cx with ROBY for treatment of NSTEMI.   - Cardiology following, f/u cards recs

## 2024-04-27 NOTE — DISCHARGE NOTE PROVIDER - NSFOLLOWUPCLINICS_GEN_ALL_ED_FT
Cardiology at [facility]  Cardiology  [address] [place], [place] [id number]  Phone: [phone]  Fax:

## 2024-04-27 NOTE — DISCHARGE NOTE PROVIDER - NSDCCPTREATMENT_GEN_ALL_CORE_FT
PRINCIPAL PROCEDURE  Procedure: Left heart cardiac cath  Findings and Treatment: 4/26 LHC: oLCx 95% x1 ROBY, RFA accessed      SECONDARY PROCEDURE  Procedure: 2D echo  Findings and Treatment: 1. Left ventricular cavity is normal in size. Left ventricular wall thickness is normal. Left ventricular systolic function is severely decreased with an ejection fraction of 33 % by Oh's method of disks. Regional wall motion abnormalities present.   2. There is a device lead seen in the right atrium.   3. The right ventricle is not well visualized. Tricuspid annular plane systolic excursion (TAPSE) is 2.5 cm (normal >=1.7 cm).   4. The left atrium is mildly dilated.   5. The inferior vena cava is normal in size measuring 2.10 cm in diameter, (normal <2.1cm) with abnormal inspiratory collapse (abnormal <50%) consistent with mildly elevated right atrial pressure (~8, range 5-10mmHg).   6. No pericardial effusion seen.   7. There is moderate (grade 2) left ventricular diastolic dysfunction, with elevated filling pressure.   8. The LV is globally hypokinetic, with the mid anteroseptum the best moving segment.   9. There is no evidence of a left ventricular thrombus.

## 2024-04-27 NOTE — PROGRESS NOTE ADULT - PROBLEM SELECTOR PROBLEM 1
T2DM (type 2 diabetes mellitus)
NSTEMI (non-ST elevation myocardial infarction)
NSTEMI (non-ST elevation myocardial infarction)

## 2024-04-27 NOTE — PROGRESS NOTE ADULT - ASSESSMENT
The patient is a 64y Male with PMH of CHF, CAD, T2DM, HTN, HLD, admitted with an NSTEMI s/p PCI. Endocrinology consulted for uncontrolled T2DM.    #Uncontrolled Type 2 Diabetes Mellitus   - Follows with: used to follow with endo Dr. Rain, but has not been seen in 2 years. PCP has been prescribing his meds recently.  - A1C with Estimated Average Glucose Result: 11.1 % (04-26-24)  - Home regimen: lantus (glargine) 60 units qhs; Admelog (lispro) 20 units TIDAC; glipizide 10mg BID; farxiga 10mg daily; metformin 1000mg BID; trulicity 1.5mg weekly  - eGFR: 102 mL/min/1.73m2 (04-26-24)  - Weight (kg): 86.2 (04-25-24)  - glucose 400s yesterday, improving today, no evidence of DKA on labs      INPATIENT PLAN:  -4/27 pt admits to snacking in between meals. He had a snack at bedtime, FS was 302. Serum glucose this am was 189, morning FS was 382, he admits to eating prior to them checking morning FS.   - Increase c/w Lantus to 54 units QHS   - Increase c/w Admelog to 18 units TID before meals (HOLD if NPO or not eating)  - Continue with moderate dose admelog correction scale TIDQAC and separate moderate dose scale QHS  - Please check FSG before meals and QHS, or q6h while NPO  - Inpatient glucose goals: <140 pre-meal, <180 random  - consistent carb diet when eating  - nutrition consult placed      DISCHARGE PLANNING:  - Discharge recs: Intended discharge if pt d/c today: Likely c/w Lantus 48 units qhs, Admelog 16 units premeal TID, Increase Trulicity to 3mg weekly. C/w metformin 1000mg BID & C/w Farxiga 10mg daily. STOP glipizide.  - Patient would benefit from a CGM on discharge. Please prescribe Freestyle Urbano 3 sensor, dispense 2 for 30 day supply. If patient has iphone can download urbano 3 stefani, otherwise also Rx 1 reader..  - will need Endocrinology follow up. Please provide clinic info in DC paperwork for patient to make an appointment: [facility] [place] ([facility] [address] [place], [place], [address]. Phone number: [phone] (email sent to clinic for f/u appointment).    #Hypertension  - Goal BP <130/80  - on metoprolol, spironolactone, and losartan  - Management as per primary team  - check urine microalbumin level as outpatient    #Hyperlipidemia  - LDL goal <70  - Last LDL: 113 mg/dL (04-25-24)  - on atorvastatin 80mg daily  - check lipid panel as outpatient on a yearly basis    Recommendations communicated to primary team NORBERT Mittal [facility]  Nurse Practitioner  Division of Endocrinology & Diabetes  pager [phone]   For nonurgent matters (including consults or followup questions), please email [email or web address] or [email or web address]. For urgent matters, please call answering service at [phone] (weekdays), [phone] (nights/weekends).    The patient is a 64y Male with PMH of CHF, CAD, T2DM, HTN, HLD, admitted with an NSTEMI s/p PCI. Endocrinology consulted for uncontrolled T2DM.    #Uncontrolled Type 2 Diabetes Mellitus   - Follows with: used to follow with endo Dr. Rain, but has not been seen in 2 years. PCP has been prescribing his meds recently.  - A1C with Estimated Average Glucose Result: 11.1 % (04-26-24)  - Home regimen: lantus (glargine) 60 units qhs; Admelog (lispro) 20 units TIDAC; glipizide 10mg BID; farxiga 10mg daily; metformin 1000mg BID; trulicity 1.5mg weekly  - eGFR: 102 mL/min/1.73m2 (04-26-24)  - Weight (kg): 86.2 (04-25-24)  - glucose 400s yesterday, improving today, no evidence of DKA on labs      INPATIENT PLAN:  -4/27 pt admits to snacking in between meals. He had a snack at bedtime, FS was 302. Serum glucose this am was 189, morning FS was 382, he admits to eating prior to them checking morning FS.   - Increase c/w Lantus to 54 units QHS   - Increase c/w Admelog to 20 units TID before meals (HOLD if NPO or not eating)  - Continue with moderate dose admelog correction scale TIDQAC and separate moderate dose scale QHS  - Please check FSG before meals and QHS, or q6h while NPO  - Inpatient glucose goals: <140 pre-meal, <180 random  - consistent carb diet when eating  - nutrition consult placed      DISCHARGE PLANNING:  - Discharge recs: Intended discharge if pt d/c today: Likely c/w Lantus 48 units qhs, Admelog 16 units premeal TID, Increase Trulicity to 3mg weekly. C/w metformin 1000mg BID & C/w Farxiga 10mg daily. STOP glipizide.  - Patient would benefit from a CGM on discharge. Please prescribe Freestyle Urbano 3 sensor, dispense 2 for 30 day supply. If patient has iphone can download urbano 3 stefani, otherwise also Rx 1 reader..  - will need Endocrinology follow up. Please provide clinic info in DC paperwork for patient to make an appointment: [facility] [place] ([facility] [address] [place], [place], [address]. Phone number: [phone] (email sent to clinic for f/u appointment).    #Hypertension  - Goal BP <130/80  - on metoprolol, spironolactone, and losartan  - Management as per primary team  - check urine microalbumin level as outpatient    #Hyperlipidemia  - LDL goal <70  - Last LDL: 113 mg/dL (04-25-24)  - on atorvastatin 80mg daily  - check lipid panel as outpatient on a yearly basis    Recommendations communicated to primary team NORBERT Mittal [facility]  Nurse Practitioner  Division of Endocrinology & Diabetes  pager [phone]   For nonurgent matters (including consults or followup questions), please email [email or web address] or [email or web address]. For urgent matters, please call answering service at [phone] (weekdays), [phone] (nights/weekends).

## 2024-04-27 NOTE — DISCHARGE NOTE PROVIDER - HOSPITAL COURSE
63 y/o male, with a PmHx of HFrEF (last EF 30%) s/p Medtronic ICD, HTN, DM2, HLD, CAD (s/p MI in November 2005 with four-vessel bypass in 2005 at [facility]), p/w SOB and NSTEMI     Hospital course    NSTEMI  CE'S: hsTrop: 214-->426-->662; CKMB: 13.9-->37.8; CK: 469  s/p ASA/plavix load, s/p hep gtt   cont metoprolol, aspirin, plavix and atorvastatin; hold lisinopril in anticipation for cath  4/25 TTE : EF 33 %, + WMA. Mildly elevated right atrial pressure. Moderate (grade 2) left ventricular diastolic dysfunction, with elevated filling pressure. The LV is globally hypokinetic, with the mid anteroseptum the best moving segment.  4/26 LHC: oLCx 95% x1 ROBY, RFA accessed, ASA 325mg + Plavix 75mg  started on metoprolol, spirolactine and losartan     SOB/HF  history of CHF, EF of 30% with Medtronic ICD  s/p BIPAP, now on RA  s/p IV lasix now on PO   per cardiology recommended to consider entresto outpatient     HTN  cont metoprolol, hold lisinopril for now    HLD  atorvastatin 80mg    DM2  A1C 11.1  endo following recommended discharge with lantus 48u HS, Ademelog 16U. Increase Trulicity to 3mg weekly. c/w metformin 1000mg BID and c/w Farxiga daily. Stop glipizide    Case discussed with Dr Art on 4/27 pt medically cleared for discharge. 63 y/o male, with a PmHx of HFrEF (last EF 30%) s/p Medtronic ICD, HTN, DM2, HLD, CAD (s/p MI in November 2005 with four-vessel bypass in 2005 at [facility]), p/w SOB and NSTEMI     Hospital course    NSTEMI  CE'S: hsTrop: 214-->426-->662; CKMB: 13.9-->37.8; CK: 469  s/p ASA/plavix load, s/p hep gtt   cont metoprolol, aspirin, plavix and atorvastatin; hold lisinopril in anticipation for cath  4/25 TTE : EF 33 %, + WMA. Mildly elevated right atrial pressure. Moderate (grade 2) left ventricular diastolic dysfunction, with elevated filling pressure. The LV is globally hypokinetic, with the mid anteroseptum the best moving segment.  4/26 LHC: oLCx 95% x1 ROBY, RFA accessed, ASA 325mg + Plavix 75mg  started on metoprolol, spirolactone and losartan     SOB/HF  history of CHF, EF of 30% with Medtronic ICD  s/p BIPAP, now on RA  s/p IV lasix now on PO   per cardiology recommended to consider entresto outpatient     HTN  cont metoprolol, hold lisinopril for now    HLD  atorvastatin 80mg    DM2  A1C 11.1  endo following recommended discharge with lantus 48u HS, Ademelog 16U. Increase Trulicity   to 3mg weekly. c/w metformin 1000mg BID and c/w Farxiga daily. Stop glipizide  stable for dc home, advised to f/u as oupt    Case discussed with Dr Art on 4/27 pt medically cleared for discharge.

## 2024-04-27 NOTE — PROGRESS NOTE ADULT - PROBLEM SELECTOR PLAN 2
P/w severe SOB requiring Bipap in the ED and now appears comfortable on 2L. Patient appears mildly overloaded- no edema noted, CXR w/ pleural effusion, BNP mildly elevated 1128.   - TTE:LV globally hypokinetic w/ systolic function severely decreased w/ EF 33% w/ SWMA, IVC c/w mildly elevated RA pressure, grade 2 diastolic dysfunction   - now on po lasix  - On asa, statin, Plavix, Metoprolol, Farxiga   - Switch from Lisinopril to Losartan and then will eventually start Entresto after washout period   - Strict I/O's  - Cardiology following
P/w severe SOB requiring Bipap in the ED and now appears comfortable on 2L. Patient appears mildly overloaded- no edema noted, CXR w/ pleural effusion, BNP mildly elevated 1128.   - TTE:LV globally hypokinetic w/ systolic function severely decreased w/ EF 33% w/ SWMA, IVC c/w mildly elevated RA pressure, grade 2 diastolic dysfunction   - Cont Lasix IV 40 BID   - On asa, statin, Plavix, Metoprolol, Farxiga   - Switch from Lisinopril to Losartan and then will eventually start Entresto after washout period   - Strict I/O's  - Cardiology following

## 2024-04-27 NOTE — DISCHARGE NOTE PROVIDER - NSDCCPCAREPLAN_GEN_ALL_CORE_FT
PRINCIPAL DISCHARGE DIAGNOSIS  Diagnosis: NSTEMI (non-ST elevation myocardial infarction)  Assessment and Plan of Treatment: you had cardiac cath this admission with a stent placed to open the heart artery. continue aspirin and plavix as directed and followup with cardiology clinic for 1 week please call to make appointmemt      SECONDARY DISCHARGE DIAGNOSES  Diagnosis: Acute on chronic HFrEF (heart failure with reduced ejection fraction)  Assessment and Plan of Treatment: Low salt diet, fluid restriction to 1500 ml daily, monitor your fluid intake and weight daily, exercise as tolerated 30 minutes daily, and follow up with your cardiologist in 1 week, consider entresto outpatient to be discuss with your provider    Diagnosis: Hypertension  Assessment and Plan of Treatment: Low sodium and fat diet, continue anti-hypertensive medications, and follow up with primary care physician.   *****please stop lisinopril after discharge****    Diagnosis: Diabetes mellitus  Assessment and Plan of Treatment: Goal A1C 7.0. Your A1C is 11.1  please change your insulin to: lantus 48units at night, admelog 16units before meal, increase Trulicity to 3mg weekly, metformin 1g twice daily, farxiga 10mg daily. Stop glipizide. please call endocrine clinic for appointment   Hypoglycemia management: please check your fingerstick every morning or if you are not feeling well. If your FS is >300mg/dl X3 or more readings please contact your PMD/Endocrinologist. If your FS is low <70mg/dl and/or you have symptoms of very low blood sugar FIRST drink1/2 cup of apple juice (or take 4 glucose tab/tube of glucose gel) and recheck FS in 15mins. Repeat these steps until blood sugar is above 100mg/dl, if NECESSARY. Then call your provider to discuss low blood sugar.   What to expend at followup appointment: please bring a log of your fingerstick and/or your glucometer to you appointment. Your blood sugar tracking will help your diabetes team determine the best plan    Diagnosis: HLD (hyperlipidemia)  Assessment and Plan of Treatment:      PRINCIPAL DISCHARGE DIAGNOSIS  Diagnosis: NSTEMI (non-ST elevation myocardial infarction)  Assessment and Plan of Treatment: you had cardiac cath this admission with a stent placed to open the heart artery. continue aspirin and plavix as directed and followup with cardiology clinic for 1 week please call to make appointmemt      SECONDARY DISCHARGE DIAGNOSES  Diagnosis: Acute on chronic HFrEF (heart failure with reduced ejection fraction)  Assessment and Plan of Treatment: Low salt diet, fluid restriction to 1500 ml daily, monitor your fluid intake and weight daily, exercise as tolerated 30 minutes daily, and follow up with your cardiologist in 1 week, consider entresto outpatient to be discuss with your provider    Diagnosis: Hypertension  Assessment and Plan of Treatment: Low sodium and fat diet, continue anti-hypertensive medications, and follow up with primary care physician.   *****please stop lisinopril after discharge****    Diagnosis: Diabetes mellitus  Assessment and Plan of Treatment: Goal A1C 7.0. Your A1C is 11.1  please change your insulin to: lantus 48units at night, admelog 16units before meal, increase Trulicity to 3mg weekly, metformin 1g twice daily, farxiga 10mg daily. Stop glipizide. please call endocrine clinic for appointment   Hypoglycemia management: please check your fingerstick every morning or if you are not feeling well. If your FS is >300mg/dl X3 or more readings please contact your PMD/Endocrinologist. If your FS is low <70mg/dl and/or you have symptoms of very low blood sugar FIRST drink1/2 cup of apple juice (or take 4 glucose tab/tube of glucose gel) and recheck FS in 15mins. Repeat these steps until blood sugar is above 100mg/dl, if NECESSARY. Then call your provider to discuss low blood sugar.   What to expend at followup appointment: please bring a log of your fingerstick and/or your glucometer to you appointment. Your blood sugar tracking will help your diabetes team determine the best plan    Diagnosis: HLD (hyperlipidemia)  Assessment and Plan of Treatment: cont your medication and f/u as outpt

## 2024-04-27 NOTE — PROGRESS NOTE ADULT - ASSESSMENT
64 year old male with PMHx HFrEF (last EF 30%), HTN, DM2, HLD, CAD s/p MI in November 2005 with four-vessel bypass in 2005 at [facility] here for SOB and CP.     #New BIPAP requirement  #SOB  #HFrEF (EF 30%)  - Transition to Lasix 40mg BID  - C/w Metoprolol succinate 100mg daily  - C/w losartan 50mg started, consider entresto outpatient if covered by insurance   - Spironolactone 25mg daily  - Replete lytes to maintain K>4 and Mg>2    #H/O HTN  - Losartan, BB as above    #NSTEMI  - S/p PCI to ostial LCx on 4/26  - Continue DAPT (ASA 81mg, Plavix 75mg), high intensity statin    Follow up with Cardiology 1-2 weeks from discharge.     Sanjay Dejesus MD  Cardiology Fellow - PGY 6  For all New Consults and Questions:  [email or web address]   Login: [email or web address] 64 year old male with PMHx HFrEF (last EF 30%), HTN, DM2, HLD, CAD s/p MI in November 2005 with four-vessel bypass in 2005 at [facility] here for SOB and CP.     #New BIPAP requirement  #SOB  #HFrEF (EF 30%)  - Continue with Lasix 40mg IV BID (LVEDP in 40s during LHC yesterday), likely transition to Lasix 40mg PO BID on Monday  - C/w Metoprolol succinate 100mg daily  - C/w losartan 50mg started, consider entresto outpatient if covered by insurance   - Spironolactone 25mg daily  - Replete lytes to maintain K>4 and Mg>2    #H/O HTN  - Losartan, BB as above    #NSTEMI  - S/p PCI to ostial LCx on 4/26  - Continue DAPT (ASA 81mg, Plavix 75mg), high intensity statin    Follow up with Cardiology 1-2 weeks from discharge.     Sanjay Dejesus MD  Cardiology Fellow - PGY 6  For all New Consults and Questions:  [email or web address]   Login: Personal 64 year old male with PMHx HFrEF (last EF 30%), HTN, DM2, HLD, CAD s/p MI in November 2005 with four-vessel bypass in 2005 at [facility] here for SOB and CP.     #New BIPAP requirement  #SOB  #HFrEF (EF 30%)  - Transition to Lasix 40mg PO BID   - C/w Metoprolol succinate 100mg daily  - C/w losartan 50mg, consider entresto outpatient if covered by insurance   - Spironolactone 25mg daily  - Replete lytes to maintain K>4 and Mg>2    #H/O HTN  - Losartan, BB as above    #NSTEMI  - S/p PCI to ostial LCx on 4/26  - Continue DAPT (ASA 81mg, Plavix 75mg), high intensity statin    Follow up with Cardiology 1-2 weeks from discharge.     Sanjay Dejesus MD  Cardiology Fellow - PGY 6  For all New Consults and Questions:  [email or web address]   Login: [email or web address]

## 2024-04-27 NOTE — DISCHARGE NOTE PROVIDER - NSDCMRMEDTOKEN_GEN_ALL_CORE_FT
Admelog SoloStar 100 units/mL injectable solution: 20 unit(s) injectable 3 times a day (with meals)  aspirin 81 mg oral tablet: 1 tab(s) orally once a day  atorvastatin 80 mg oral tablet: 1 tab(s) orally once a day  Claritin 10 mg oral tablet: 1 tab(s) orally once a day  ergocalciferol 1.25 mg (50,000 intl units) oral tablet: orally once a week  Farxiga 10 mg oral tablet: 1 tab(s) orally once a day  glipiZIDE 10 mg oral tablet, extended release: 1 tab(s) orally once a day  insulin glargine 100 units/mL subcutaneous solution: 60 subcutaneous once a day (at bedtime)  Lasix 20 mg oral tablet: 1 tab(s) orally once a day  lisinopril 20 mg oral tablet: 1 tab(s) orally once a day  metFORMIN 1000 mg oral tablet: 1 tab(s) orally 2 times a day  Metoprolol Tartrate 50 mg oral tablet: 1 tab(s) orally 2 times a day  Multiple Vitamins oral tablet: 1 tab(s) orally once a day  pantoprazole 40 mg oral delayed release tablet: 1 tab(s) orally once a day (before a meal)  Plavix 75 mg oral tablet: 1 tab(s) orally once a day  Trulicity Pen 1.5 mg/0.5 mL subcutaneous solution: 1.5 milligram(s) subcutaneously once a week  Vitamin B12 1000 mcg oral tablet: 1 tab(s) orally once a day   Admelog SoloStar 100 units/mL injectable solution: 16 international unit(s) injectable 3 times a day (before meals)  aspirin 325 mg oral tablet: 1 tab(s) orally once a day  atorvastatin 80 mg oral tablet: 1 tab(s) orally once a day  Claritin 10 mg oral tablet: 1 tab(s) orally once a day  ergocalciferol 1.25 mg (50,000 intl units) oral tablet: orally once a week  Farxiga 10 mg oral tablet: 1 tab(s) orally once a day  freestyle Urbano 3 reader: as directed  freestyle Urbano 3 sensor: as directed  furosemide 40 mg oral tablet: 1 tab(s) orally 2 times a day  Insulin Glargine Solostar Pen 100 units/mL subcutaneous solution: 48 unit(s) subcutaneous once a day (at bedtime)  losartan 50 mg oral tablet: 1 tab(s) orally once a day  metFORMIN 1000 mg oral tablet: 1 tab(s) orally 2 times a day  metoprolol succinate 100 mg oral tablet, extended release: 1 tab(s) orally once a day  Multiple Vitamins oral tablet: 1 tab(s) orally once a day  pantoprazole 40 mg oral delayed release tablet: 1 tab(s) orally once a day (before a meal)  Plavix 75 mg oral tablet: 1 tab(s) orally once a day  spironolactone 25 mg oral tablet: 1 tab(s) orally once a day  Trulicity Pen 3 mg/0.5 mL subcutaneous solution: 3 milligram(s) subcutaneously once a week  Vitamin B12 1000 mcg oral tablet: 1 tab(s) orally once a day

## 2024-04-27 NOTE — DISCHARGE NOTE PROVIDER - NSDCACTIVITY_GEN_ALL_CORE
ambulate as tolerates  No heavy lifting x one week. No strenuous activity x 3 weeks. Monitor site of procedure and notify your doctor for any redness, swelling, discharge. No driving x 24 hours. You may shower but no baths or swimming x one week.

## 2024-04-27 NOTE — PROGRESS NOTE ADULT - PROBLEM SELECTOR PROBLEM 2
Acute on chronic HFrEF (heart failure with reduced ejection fraction)
HTN (hypertension)
Acute on chronic HFrEF (heart failure with reduced ejection fraction)

## 2024-04-27 NOTE — DISCHARGE NOTE NURSING/CASE MANAGEMENT/SOCIAL WORK - PATIENT PORTAL LINK FT
You can access the FollowMyHealth Patient Portal offered by [facility] by registering at the following website: [email or web address]. By joining [employer]’s FollowMyHealth portal, you will also be able to view your health information using other applications (apps) compatible with our system.

## 2024-04-27 NOTE — DISCHARGE NOTE NURSING/CASE MANAGEMENT/SOCIAL WORK - NSDCFUADDAPPT_GEN_ALL_CORE_FT
Endocrine clininc  [facility] [place] ([facility] [address] [place], [place], [address]. Phone number: [phone]

## 2024-04-27 NOTE — PROGRESS NOTE ADULT - PROBLEM SELECTOR PLAN 6
Med rec completed with patient's pharmacy     Diet: DASH, CC diet     Plan discussed with ACP
Med rec completed with patient's pharmacy     Diet: DASH, CC diet   Dispo: per cards pt can be dc home, dc planning time spent in coordination 40mts ( preparing dc summary and plan)  Plan discussed with ACP

## 2024-04-27 NOTE — PROGRESS NOTE ADULT - PROBLEM SELECTOR PLAN 5
S/p CABG in 2005 and stent in 2012  - on Plavix, statin, Metoprolol as above
S/p CABG in 2005 and stent in 2012  - on Plavix, statin, Metoprolol as above

## 2024-05-21 ENCOUNTER — APPOINTMENT (OUTPATIENT)
Dept: CARDIOLOGY | Facility: HOSPITAL | Age: 64
End: 2024-05-21

## 2024-05-21 ENCOUNTER — NON-APPOINTMENT (OUTPATIENT)
Age: 64
End: 2024-05-21

## 2024-05-21 VITALS
OXYGEN SATURATION: 96 % | DIASTOLIC BLOOD PRESSURE: 75 MMHG | HEART RATE: 64 BPM | BODY MASS INDEX: 30.76 KG/M2 | HEIGHT: 67 IN | SYSTOLIC BLOOD PRESSURE: 133 MMHG | WEIGHT: 196 LBS

## 2024-05-21 DIAGNOSIS — R93.1 ABNORMAL FINDINGS ON DIAGNOSTIC IMAGING OF HEART AND CORONARY CIRCULATION: ICD-10-CM

## 2024-05-21 DIAGNOSIS — I25.10 ATHEROSCLEROTIC HEART DISEASE OF NATIVE CORONARY ARTERY W/OUT ANGINA PECTORIS: ICD-10-CM

## 2024-05-21 DIAGNOSIS — R94.31 ABNORMAL ELECTROCARDIOGRAM [ECG] [EKG]: ICD-10-CM

## 2024-05-21 DIAGNOSIS — I10 ESSENTIAL (PRIMARY) HYPERTENSION: ICD-10-CM

## 2024-05-21 DIAGNOSIS — E11.9 TYPE 2 DIABETES MELLITUS W/OUT COMPLICATIONS: ICD-10-CM

## 2024-05-21 DIAGNOSIS — I25.5 ISCHEMIC CARDIOMYOPATHY: ICD-10-CM

## 2024-05-21 DIAGNOSIS — E78.5 HYPERLIPIDEMIA, UNSPECIFIED: ICD-10-CM

## 2024-05-21 DIAGNOSIS — Z79.4 TYPE 2 DIABETES MELLITUS W/OUT COMPLICATIONS: ICD-10-CM

## 2024-05-21 DIAGNOSIS — E66.9 OBESITY, UNSPECIFIED: ICD-10-CM

## 2024-05-21 DIAGNOSIS — I25.9 CHRONIC ISCHEMIC HEART DISEASE, UNSPECIFIED: ICD-10-CM

## 2024-05-21 RX ORDER — DAPAGLIFLOZIN 10 MG/1
10 TABLET, FILM COATED ORAL DAILY
Qty: 90 | Refills: 2 | Status: ACTIVE | COMMUNITY

## 2024-05-21 RX ORDER — ATORVASTATIN CALCIUM 80 MG/1
80 TABLET, FILM COATED ORAL
Qty: 90 | Refills: 2 | Status: ACTIVE | COMMUNITY

## 2024-05-21 RX ORDER — CLOPIDOGREL BISULFATE 75 MG/1
75 TABLET, FILM COATED ORAL DAILY
Qty: 90 | Refills: 2 | Status: ACTIVE | COMMUNITY
Start: 2024-05-21 | End: 1900-01-01

## 2024-05-21 RX ORDER — METOPROLOL SUCCINATE 100 MG/1
100 TABLET, EXTENDED RELEASE ORAL DAILY
Qty: 90 | Refills: 2 | Status: ACTIVE | COMMUNITY
Start: 2022-10-27 | End: 1900-01-01

## 2024-05-21 RX ORDER — FUROSEMIDE 40 MG/1
40 TABLET ORAL TWICE DAILY
Qty: 180 | Refills: 1 | Status: ACTIVE | COMMUNITY

## 2024-05-21 RX ORDER — SPIRONOLACTONE 25 MG/1
25 TABLET ORAL DAILY
Qty: 90 | Refills: 2 | Status: ACTIVE | COMMUNITY
Start: 2024-05-21 | End: 1900-01-01

## 2024-05-21 RX ORDER — FOLIC ACID 1 MG/1
1 TABLET ORAL
Qty: 90 | Refills: 2 | Status: ACTIVE | COMMUNITY

## 2024-05-21 RX ORDER — LISINOPRIL 20 MG/1
20 TABLET ORAL DAILY
Qty: 30 | Refills: 3 | Status: DISCONTINUED | COMMUNITY
End: 2024-05-21

## 2024-05-21 RX ORDER — ASPIRIN ENTERIC COATED TABLETS 81 MG 81 MG/1
81 TABLET, DELAYED RELEASE ORAL
Qty: 90 | Refills: 2 | Status: ACTIVE | COMMUNITY

## 2024-05-21 RX ORDER — FERROUS GLUCONATE 324(38)MG
324 (38 FE) TABLET ORAL
Qty: 90 | Refills: 2 | Status: ACTIVE | COMMUNITY

## 2024-05-21 RX ORDER — ROSUVASTATIN CALCIUM 20 MG/1
20 TABLET, FILM COATED ORAL
Qty: 90 | Refills: 1 | Status: DISCONTINUED | COMMUNITY
Start: 2022-10-27 | End: 2024-05-21

## 2024-05-21 RX ORDER — SACUBITRIL AND VALSARTAN 24; 26 MG/1; MG/1
24-26 TABLET, FILM COATED ORAL TWICE DAILY
Qty: 180 | Refills: 2 | Status: ACTIVE | COMMUNITY
Start: 2024-05-21 | End: 1900-01-01

## 2024-05-21 NOTE — PHYSICAL EXAM
[Well Developed] : well developed [Well Nourished] : well nourished [Normal Venous Pressure] : normal venous pressure [Normal S1, S2] : normal S1, S2 [No Murmur] : no murmur [Clear Lung Fields] : clear lung fields [Good Air Entry] : good air entry [Normal] : no edema, no cyanosis, no clubbing, no varicosities [No Edema] : no edema

## 2024-05-23 ENCOUNTER — NON-APPOINTMENT (OUTPATIENT)
Age: 64
End: 2024-05-23

## 2024-05-26 NOTE — REVIEW OF SYSTEMS
[Negative] : Neurological [SOB] : no shortness of breath [Dyspnea on exertion] : not dyspnea during exertion [Lower Ext Edema] : no extremity edema [Chest Discomfort] : no chest discomfort [Leg Claudication] : no intermittent leg claudication [Palpitations] : no palpitations

## 2024-05-26 NOTE — HISTORY OF PRESENT ILLNESS
[FreeTextEntry1] : Since discharge, patient has done well. No chest pain, LE edema, SOB, RESENDIZ, palpitations. Has been compliant with all medications. No new complaints at this time.

## 2024-05-26 NOTE — ASSESSMENT
[FreeTextEntry1] : 64 year-old obese man presenting to [facility]. Previously seen by Dr Orosco, but lost to follow up.  Hx of CAD s/p CABG (2005, [facility], LIMA-LAD, PREETI-RCA, SVG-OM) and PCI, HFrEF s/p dual chamber ICD (Medtronic), HTN, HLD, T2DM. Previous catheterizations have shown patient arterial grafts, but closed SVG-OM  graft. Patient admitted to [facility] in April 2024 for chest pain, acute decompensated heart failure. S/p PCI to LCx, optimization of GDMT. TTE w/ EF ~33%.  #CAD s/p CABG, PCI CABG in 2005 @ [facility]- LIMA-LAD, PREETI-RCA, SVG-OM [facility] April 2024 w/ patient arterial grafts, closed SVG-OM1; s/p PCI to Lcx - decrease ASA from 325mg to 81mg qd (unsure why he was discharged on 325) - continue plavix 75mg qd x 1 year - continue Metoprolol succinate 100mg qd - continue atorvastatin 80mg qd  #HFrEF #Ischemic Cardiomyopathy EF ~33% on most recent TTE GDMT: BB- continue metoprolol XL 100mg qd ACE/ARB/ARNI- discharged on losartan 50mg qd. Will change to Entresto 24-26mg BID. Confirmed coverage w/ patient's pharmacy [facility]- continue Spironolactone 25mg qd SGLT2- contine Farxiga 10mg qd Device- Medtronic ICD in place. Device check next visit  - f/u in 1 month. will check labs that visit  #HTN BP at goal today - change losartan to Entresto 24-25mg BID - continue metoprolol, spironolactone - f/u 1 month  #HLD - continue atorvastatin 80mg qd  #T2DM A1c 11 - f/u w/ PCP

## 2024-05-26 NOTE — REASON FOR VISIT
[Cardiac Failure] : cardiac failure [Coronary Artery Disease] : coronary artery disease [FreeTextEntry1] : 64 year-old obese man presenting to -[facility]. Previously seen by Dr Orosco, but lost to follow up.  Hx of CAD s/p CABG (2005, [facility], LIMA-LAD, PREETI-RCA, SVG-OM) and PCI, HFrEF s/p dual chamber ICD (Medtronic), HTN, HLD, T2DM. Previous catheterizations have shown patient arterial grafts, but closed SVG-OM  graft. Patient admitted to [facility] in April 2024 for chest pain, acute decompensated heart failure. S/p PCI to LCx, optimization of GDMT. TTE w/ EF ~33%.

## 2024-07-23 ENCOUNTER — APPOINTMENT (OUTPATIENT)
Dept: ELECTROPHYSIOLOGY | Facility: CLINIC | Age: 64
End: 2024-07-23
Payer: MEDICAID

## 2024-07-23 ENCOUNTER — NON-APPOINTMENT (OUTPATIENT)
Age: 64
End: 2024-07-23

## 2024-07-23 ENCOUNTER — APPOINTMENT (OUTPATIENT)
Dept: CARDIOLOGY | Facility: HOSPITAL | Age: 64
End: 2024-07-23

## 2024-07-23 ENCOUNTER — APPOINTMENT (OUTPATIENT)
Dept: ENDOCRINOLOGY | Facility: CLINIC | Age: 64
End: 2024-07-23

## 2024-07-23 VITALS — SYSTOLIC BLOOD PRESSURE: 114 MMHG | DIASTOLIC BLOOD PRESSURE: 64 MMHG | HEART RATE: 60 BPM

## 2024-07-23 DIAGNOSIS — I25.5 ISCHEMIC CARDIOMYOPATHY: ICD-10-CM

## 2024-07-23 DIAGNOSIS — I50.9 HEART FAILURE, UNSPECIFIED: ICD-10-CM

## 2024-07-23 DIAGNOSIS — I25.10 ATHEROSCLEROTIC HEART DISEASE OF NATIVE CORONARY ARTERY W/OUT ANGINA PECTORIS: ICD-10-CM

## 2024-07-23 DIAGNOSIS — I10 ESSENTIAL (PRIMARY) HYPERTENSION: ICD-10-CM

## 2024-07-23 DIAGNOSIS — E78.5 HYPERLIPIDEMIA, UNSPECIFIED: ICD-10-CM

## 2024-07-23 PROCEDURE — 93283 PRGRMG EVAL IMPLANTABLE DFB: CPT

## 2024-07-25 NOTE — ASSESSMENT
[FreeTextEntry1] : Mr. Durbin is a 63 yo M with hx CAD s/p CABG s/p CABG (2005, [facility], LIMA-LAD, PREETI-RCA, SVG-OM with previous catheterizations showing patent arterial grafts but closed SVG-OM graft) and PCI, HFrEF s/p dual chamber ICD (Medtronic), HTN, HLD, T2DM, recent hospitalization 4/2024 for ADHF s/p PCI with stent to LCx here for follow-up.  #CAD s/p CABG, PCI CABG in 2005 @ [facility]- LIMA-LAD, PREETI-RCA, SVG-OM [facility] April 2024 w/ patient arterial grafts, closed SVG-OM1; s/p PCI to Lcx - continue asa 81 mg daily  - continue plavix 75mg qd x 1 year - continue Metoprolol succinate 100mg qd - continue atorvastatin 80mg qd  #HFrEF #Ischemic Cardiomyopathy EF ~33% on most recent TTE 4/2024 GDMT: BB- continue metoprolol XL 100mg qd ACE/ARB/ARNI- C/W Entresto 24-26mg BID. If labs OK today will plan to increase dose to 49/51mg BID MRA- continue Spironolactone 25mg qd SGLT2- contine Farxiga 10mg qd Device- Medtronic ICD in place. Device checked at this visit- __________  - f/u in 1 month. will check labs that visit  #HTN BP at goal today - C/W Entresto 24-26mg BID. If labs OK will plan to increase dose as above - continue metoprolol, spironolactone - f/u 1 month  #HLD - continue atorvastatin 80mg qd  #T2DM A1c 11 - f/u w/ PCP.

## 2024-07-25 NOTE — REASON FOR VISIT
[Cardiac Failure] : cardiac failure [Coronary Artery Disease] : coronary artery disease [FreeTextEntry1] : 65 yo M here for follow up and ICD check

## 2024-07-25 NOTE — ASSESSMENT
[FreeTextEntry1] : Mr. Durbin is a 63 yo M with hx CAD s/p CABG s/p CABG (2005, [facility], LIMA-LAD, PREETI-RCA, SVG-OM with previous catheterizations showing patent arterial grafts but closed SVG-OM graft) and PCI, HFrEF s/p dual chamber ICD (Medtronic), HTN, HLD, T2DM, recent hospitalization 4/2024 for ADHF s/p PCI with stent to LCx here for follow-up.  #CAD s/p CABG, PCI CABG in 2005 @ [facility]- LIMA-LAD, PREETI-RCA, SVG-OM [facility] April 2024 w/ patient arterial grafts, closed SVG-OM1; s/p PCI to Lcx - continue asa 81 mg daily  - continue plavix 75mg qd x 1 year - continue Metoprolol succinate 100mg qd - continue atorvastatin 80mg qd  #HFrEF #Ischemic Cardiomyopathy EF ~33% on most recent TTE 4/2024 GDMT: BB- continue metoprolol XL 100mg qd ACE/ARB/ARNI- C/W Entresto 24-26mg BID. If labs OK today will plan to increase dose to 49/51mg BID MRA- continue Spironolactone 25mg qd SGLT2- contine Farxiga 10mg qd Device- Medtronic ICD in place. Device checked at this visit- __________  - f/u in 1 month. will check labs that visit  #HTN BP at goal today - C/W Entresto 24-26mg BID. If labs OK will plan to increase dose as above - continue metoprolol, spironolactone - f/u 1 month  #HLD - continue atorvastatin 80mg qd  #T2DM A1c 11 - f/u w/ PCP. no

## 2024-07-25 NOTE — HISTORY OF PRESENT ILLNESS
[FreeTextEntry1] : Mr. Durbin is a 65 yo M with hx CAD s/p CABG s/p CABG (2005, [facility], LIMA-LAD, PREETI-RCA, SVG-OM with previous catheterizations showing patent arterial grafts but closed SVG-OM graft) and PCI, HFrEF s/p dual chamber ICD (Medtronic), HTN, HLD, T2DM, recent hospitalization 4/2024 for ADHF s/p PCI with stent to LCx here for follow-up. During last visit losartan transitioned to entresto 24-26mg BID.

## 2024-07-25 NOTE — HISTORY OF PRESENT ILLNESS
[FreeTextEntry1] : Mr. Durbin is a 63 yo M with hx CAD s/p CABG s/p CABG (2005, [facility], LIMA-LAD, PREETI-RCA, SVG-OM with previous catheterizations showing patent arterial grafts but closed SVG-OM graft) and PCI, HFrEF s/p dual chamber ICD (Medtronic), HTN, HLD, T2DM, recent hospitalization 4/2024 for ADHF s/p PCI with stent to LCx here for follow-up. During last visit losartan transitioned to entresto 24-26mg BID.

## 2024-08-19 ENCOUNTER — NON-APPOINTMENT (OUTPATIENT)
Age: 64
End: 2024-08-19

## 2024-08-20 ENCOUNTER — NON-APPOINTMENT (OUTPATIENT)
Age: 64
End: 2024-08-20

## 2024-08-20 ENCOUNTER — APPOINTMENT (OUTPATIENT)
Dept: CARDIOLOGY | Facility: HOSPITAL | Age: 64
End: 2024-08-20

## 2024-08-20 VITALS — DIASTOLIC BLOOD PRESSURE: 69 MMHG | SYSTOLIC BLOOD PRESSURE: 130 MMHG

## 2024-08-20 VITALS
WEIGHT: 194 LBS | OXYGEN SATURATION: 97 % | HEIGHT: 67 IN | DIASTOLIC BLOOD PRESSURE: 74 MMHG | SYSTOLIC BLOOD PRESSURE: 135 MMHG | BODY MASS INDEX: 30.45 KG/M2 | HEART RATE: 60 BPM

## 2024-08-20 DIAGNOSIS — I25.5 ISCHEMIC CARDIOMYOPATHY: ICD-10-CM

## 2024-08-20 DIAGNOSIS — E78.5 HYPERLIPIDEMIA, UNSPECIFIED: ICD-10-CM

## 2024-08-20 DIAGNOSIS — I25.9 CHRONIC ISCHEMIC HEART DISEASE, UNSPECIFIED: ICD-10-CM

## 2024-08-20 DIAGNOSIS — E11.9 TYPE 2 DIABETES MELLITUS W/OUT COMPLICATIONS: ICD-10-CM

## 2024-08-20 DIAGNOSIS — R93.1 ABNORMAL FINDINGS ON DIAGNOSTIC IMAGING OF HEART AND CORONARY CIRCULATION: ICD-10-CM

## 2024-08-20 DIAGNOSIS — I10 ESSENTIAL (PRIMARY) HYPERTENSION: ICD-10-CM

## 2024-08-20 NOTE — ASSESSMENT
[FreeTextEntry1] : 64 year-old obese man presenting for follow up.  Hx of CAD s/p CABG (2005, [facility], LIMA-LAD, PREETI-RCA, SVG-OM) and PCI, HFrEF s/p dual chamber ICD (Medtronic), HTN, HLD, T2DM. Previous catheterizations have shown patient arterial grafts, but closed SVG-OM graft. Patient admitted to [facility] in April 2024 for chest pain, acute decompensated heart failure. S/p PCI to LCx, optimization of GDMT. TTE w/ EF ~33%.  #CAD s/p CABG, PCI CABG in 2005 @ [facility]- LIMA-LAD, PREETI-RCA, SVG-OM [facility] April 2024 w/ patient arterial grafts, closed SVG-OM1; s/p PCI to Lcx - continue ASA 81mg qd  - continue plavix 75mg qd x 1 year - continue Metoprolol succinate 100mg qd - continue atorvastatin 80mg qd  #HFrEF #Ischemic Cardiomyopathy EF ~33% on most recent TTE GDMT: BB- continue metoprolol XL 100mg qd ACE/ARB/ARNI- started entresto 24-26mg BID last visit. will check CMP today, and increase to 49-51 if renal function and electrolytes are stable MRA- continue Spironolactone 25mg qd SGLT2- continue Farxiga 10mg qd Device- Medtronic ICD in place. Recent device check normal.  #HTN BP at goal today - started entresto 24-26mg BID last visit. will check CMP today, and increase to 49-51 if renal function and electrolytes are stable - continue metoprolol, spironolactone  #HLD - continue atorvastatin 80mg qd  #T2DM A1c 11 - f/u w/ PCP   Follow up in 3 months

## 2024-08-20 NOTE — REVIEW OF SYSTEMS
[Negative] : Neurological [SOB] : no shortness of breath [Dyspnea on exertion] : not dyspnea during exertion [Chest Discomfort] : no chest discomfort [Lower Ext Edema] : no extremity edema [Leg Claudication] : no intermittent leg claudication [Palpitations] : no palpitations

## 2024-08-20 NOTE — REASON FOR VISIT
[Symptom and Test Evaluation] : symptom and test evaluation [FreeTextEntry1] : 64 year-old obese man presenting for follow up.  Hx of CAD s/p CABG (2005, [facility], LIMA-LAD, PREETI-RCA, SVG-OM) and PCI, HFrEF s/p dual chamber ICD (Medtronic), HTN, HLD, T2DM. Previous catheterizations have shown patient arterial grafts, but closed SVG-OM  graft. Patient admitted to [facility] in April 2024 for chest pain, acute decompensated heart failure. S/p PCI to LCx, optimization of GDMT. TTE w/ EF ~33%.

## 2024-08-20 NOTE — REASON FOR VISIT
[Symptom and Test Evaluation] : symptom and test evaluation [FreeTextEntry1] : 64 year-old obese man presenting for follow up.  Hx of CAD s/p CABG (2005, [facility], LIMA-LAD, PREETI-RCA, SVG-OM) and PCI, HFrEF s/p dual chamber ICD (Medtronic), HTN, HLD, T2DM. Previous catheterizations have shown patient arterial grafts, but closed SVG-OM  graft. Patient admitted to [facility] in April 2024 for chest pain, acute decompensated heart failure. S/p PCI to LCx, optimization of GDMT. TTE w/ EF ~33%.

## 2024-08-20 NOTE — ASSESSMENT
[FreeTextEntry1] : 64 year-old obese man presenting for follow up.  Hx of CAD s/p CABG (2005, [facility], LIMA-LAD, PREETI-RCA, SVG-OM) and PCI, HFrEF s/p dual chamber ICD (Medtronic), HTN, HLD, T2DM. Previous catheterizations have shown patient arterial grafts, but closed SVG-OM graft. Patient admitted to [facility] in April 2024 for chest pain, acute decompensated heart failure. S/p PCI to LCx, optimization of GDMT. TTE w/ EF ~33%.  #CAD s/p CABG, PCI CABG in 2005 @ [facility]- LIMA-LAD, PREETI-RCA, SVG-OM [facility] April 2024 w/ patient arterial grafts, closed SVG-OM1; s/p PCI to Lcx - continue ASA 81mg qd  - continue plavix 75mg qd x 1 year - continue Metoprolol succinate 100mg qd - continue atorvastatin 80mg qd  #HFrEF #Ischemic Cardiomyopathy EF ~33% on most recent TTE GDMT: BB- continue metoprolol XL 100mg qd ACE/ARB/ARNI- started entresto 24-26mg BID last visit. will check CMP today, and increase to 49-51 if renal function and electrolytes are stable MRA- continue Spironolactone 25mg qd SGLT2- continue Farxiga 10mg qd Device- Medtronic ICD in place. Recent device check normal.  #HTN BP at goal today - started entresto 24-26mg BID last visit. will check CMP today, and increase to 49-51 if renal function and electrolytes are stable - continue metoprolol, spironolactone  #HLD - continue atorvastatin 80mg qd  #T2DM A1c 11 - f/u w/ PCP   Follow up in 3 months

## 2024-08-20 NOTE — HISTORY OF PRESENT ILLNESS
[FreeTextEntry1] : No events since last visit. Denies chest pain, shortness of breath, diaphoresis. Compliant with medications. No complaints at this time.

## 2024-11-12 ENCOUNTER — APPOINTMENT (OUTPATIENT)
Dept: CARDIOLOGY | Facility: HOSPITAL | Age: 64
End: 2024-11-12

## 2024-11-12 VITALS
HEART RATE: 60 BPM | WEIGHT: 194 LBS | SYSTOLIC BLOOD PRESSURE: 126 MMHG | DIASTOLIC BLOOD PRESSURE: 67 MMHG | OXYGEN SATURATION: 96 % | HEIGHT: 67 IN | BODY MASS INDEX: 30.45 KG/M2

## 2024-11-12 DIAGNOSIS — I10 ESSENTIAL (PRIMARY) HYPERTENSION: ICD-10-CM

## 2024-11-12 DIAGNOSIS — E78.5 HYPERLIPIDEMIA, UNSPECIFIED: ICD-10-CM

## 2024-11-12 DIAGNOSIS — I25.10 ATHEROSCLEROTIC HEART DISEASE OF NATIVE CORONARY ARTERY W/OUT ANGINA PECTORIS: ICD-10-CM

## 2024-11-12 DIAGNOSIS — E87.5 HYPERKALEMIA: ICD-10-CM

## 2024-11-12 DIAGNOSIS — I25.5 ISCHEMIC CARDIOMYOPATHY: ICD-10-CM

## 2024-11-19 PROBLEM — E87.5 HYPERKALEMIA: Status: ACTIVE | Noted: 2024-11-19

## 2024-11-19 RX ORDER — SODIUM ZIRCONIUM CYCLOSILICATE 10 G/10G
10 POWDER, FOR SUSPENSION ORAL DAILY
Qty: 5 | Refills: 0 | Status: ACTIVE | COMMUNITY
Start: 2024-11-19 | End: 1900-01-01
